# Patient Record
Sex: FEMALE | Race: WHITE | NOT HISPANIC OR LATINO | Employment: OTHER | URBAN - METROPOLITAN AREA
[De-identification: names, ages, dates, MRNs, and addresses within clinical notes are randomized per-mention and may not be internally consistent; named-entity substitution may affect disease eponyms.]

---

## 2023-08-11 ENCOUNTER — HOSPITAL ENCOUNTER (OUTPATIENT)
Dept: DATA CONVERSION | Facility: HOSPITAL | Age: 88
Discharge: HOME | End: 2023-08-11

## 2023-08-11 DIAGNOSIS — N18.4 CHRONIC KIDNEY DISEASE, STAGE 4 (SEVERE) (MULTI): ICD-10-CM

## 2023-08-11 LAB
ALBUMIN SERPL-MCNC: 4 GM/DL (ref 3.5–5)
ANION GAP SERPL CALCULATED.3IONS-SCNC: 16 MMOL/L (ref 0–19)
BUN SERPL-MCNC: 74 MG/DL (ref 8–25)
BUN/CREAT SERPL: 22.4 RATIO (ref 8–21)
CALCIUM SERPL-MCNC: 9 MG/DL (ref 8.5–10.4)
CHLORIDE SERPL-SCNC: 102 MMOL/L (ref 97–107)
CO2 SERPL-SCNC: 17 MMOL/L (ref 24–31)
CREAT SERPL-MCNC: 3.3 MG/DL (ref 0.4–1.6)
DEPRECATED RDW RBC AUTO: 40 FL (ref 37–54)
ERYTHROCYTE [DISTWIDTH] IN BLOOD BY AUTOMATED COUNT: 12.1 % (ref 11.7–15)
GFR SERPL CREATININE-BSD FRML MDRD: 13 ML/MIN/1.73 M2
GLUCOSE SERPL-MCNC: 118 MG/DL (ref 65–99)
HCT VFR BLD AUTO: 26.9 % (ref 36–44)
HGB BLD-MCNC: 9.4 GM/DL (ref 12–15)
MCH RBC QN AUTO: 31.6 PG (ref 26–34)
MCHC RBC AUTO-ENTMCNC: 34.9 % (ref 31–37)
MCV RBC AUTO: 90.6 FL (ref 80–100)
NRBC BLD-RTO: 0 /100 WBC
PHOSPHATE SERPL-MCNC: 5 MG/DL (ref 2.5–4.5)
PLATELET # BLD AUTO: 194 K/UL (ref 150–450)
PMV BLD AUTO: 10.1 CU (ref 7–12.6)
POTASSIUM SERPL-SCNC: 4.9 MMOL/L (ref 3.4–5.1)
PTH-INTACT SERPL-MCNC: 218 PG/ML (ref 15–65)
RBC # BLD AUTO: 2.97 M/UL (ref 4–4.9)
SODIUM SERPL-SCNC: 135 MMOL/L (ref 133–145)
WBC # BLD AUTO: 6.8 K/UL (ref 4.5–11)

## 2023-08-25 PROBLEM — M81.0 OSTEOPOROSIS: Status: ACTIVE | Noted: 2023-08-25

## 2023-08-25 PROBLEM — H02.834 DERMATOCHALASIS OF LEFT UPPER EYELID: Status: ACTIVE | Noted: 2023-08-25

## 2023-08-25 PROBLEM — E78.5 HYPERLIPIDEMIA: Status: ACTIVE | Noted: 2023-08-25

## 2023-08-25 PROBLEM — I82.90 VENOUS THROMBOSIS: Status: ACTIVE | Noted: 2023-08-25

## 2023-08-25 PROBLEM — G47.00 INSOMNIA: Status: ACTIVE | Noted: 2023-08-25

## 2023-08-25 PROBLEM — H04.129 TEAR FILM INSUFFICIENCY: Status: ACTIVE | Noted: 2023-08-25

## 2023-08-25 PROBLEM — I10 BENIGN ESSENTIAL HYPERTENSION: Status: ACTIVE | Noted: 2023-08-25

## 2023-08-25 PROBLEM — E55.9 VITAMIN D DEFICIENCY: Status: ACTIVE | Noted: 2023-08-25

## 2023-08-25 PROBLEM — R73.9 HYPERGLYCEMIA: Status: ACTIVE | Noted: 2023-08-25

## 2023-08-25 PROBLEM — R53.1 ASTHENIA: Status: ACTIVE | Noted: 2023-08-25

## 2023-08-25 PROBLEM — E83.42 HYPOMAGNESEMIA: Status: ACTIVE | Noted: 2023-08-25

## 2023-08-25 PROBLEM — H02.831 DERMATOCHALASIS OF RIGHT UPPER EYELID: Status: ACTIVE | Noted: 2023-08-25

## 2023-08-25 PROBLEM — H35.379 EPIRETINAL MEMBRANE: Status: ACTIVE | Noted: 2023-08-25

## 2023-08-25 PROBLEM — H35.89 MACULAR ATROPHY, RETINAL: Status: ACTIVE | Noted: 2023-08-25

## 2023-08-25 PROBLEM — N18.9 CRF (CHRONIC RENAL FAILURE): Status: ACTIVE | Noted: 2023-08-25

## 2023-08-25 RX ORDER — POLYETHYLENE GLYCOL 3350 17 G/17G
POWDER, FOR SOLUTION ORAL DAILY PRN
COMMUNITY

## 2023-08-25 RX ORDER — FUROSEMIDE 80 MG/1
1 TABLET ORAL DAILY
COMMUNITY

## 2023-08-25 RX ORDER — POLYVINYL ALCOHOL 14 MG/ML
SOLUTION/ DROPS OPHTHALMIC
COMMUNITY

## 2023-08-25 RX ORDER — ATORVASTATIN CALCIUM 10 MG/1
1 TABLET, FILM COATED ORAL DAILY
COMMUNITY
End: 2023-11-02

## 2023-08-25 RX ORDER — SODIUM BICARBONATE 650 MG/1
1 TABLET ORAL 4 TIMES DAILY
COMMUNITY

## 2023-08-25 RX ORDER — FUROSEMIDE 40 MG/1
1 TABLET ORAL DAILY
COMMUNITY
Start: 2022-12-19 | End: 2023-10-23 | Stop reason: ALTCHOICE

## 2023-08-25 RX ORDER — SEVELAMER CARBONATE 800 MG/1
1 TABLET, FILM COATED ORAL
COMMUNITY
Start: 2023-02-20

## 2023-08-25 RX ORDER — AMLODIPINE BESYLATE 5 MG/1
1.5 TABLET ORAL DAILY
COMMUNITY
End: 2023-10-23 | Stop reason: ALTCHOICE

## 2023-08-25 RX ORDER — CYANOCOBALAMIN (VITAMIN B-12) 500 MCG
1 TABLET ORAL NIGHTLY
COMMUNITY
End: 2023-10-23 | Stop reason: ALTCHOICE

## 2023-08-25 RX ORDER — GLUCOSAM/CHONDRO/HERB 149/HYAL 750-100 MG
1 TABLET ORAL DAILY
COMMUNITY
End: 2023-10-23 | Stop reason: ALTCHOICE

## 2023-08-25 RX ORDER — CALCITRIOL 0.25 UG/1
1 CAPSULE ORAL
COMMUNITY
End: 2023-10-23 | Stop reason: ALTCHOICE

## 2023-08-25 RX ORDER — CYCLOSPORINE 0.5 MG/ML
1 EMULSION OPHTHALMIC 2 TIMES DAILY
COMMUNITY

## 2023-08-25 RX ORDER — ASPIRIN 325 MG
1 TABLET, DELAYED RELEASE (ENTERIC COATED) ORAL
COMMUNITY
End: 2024-04-22 | Stop reason: SDUPTHER

## 2023-08-25 RX ORDER — LOSARTAN POTASSIUM 50 MG/1
1 TABLET ORAL DAILY
COMMUNITY

## 2023-10-23 ENCOUNTER — OFFICE VISIT (OUTPATIENT)
Dept: PRIMARY CARE | Facility: CLINIC | Age: 88
End: 2023-10-23
Payer: MEDICARE

## 2023-10-23 VITALS
HEART RATE: 100 BPM | BODY MASS INDEX: 25.96 KG/M2 | SYSTOLIC BLOOD PRESSURE: 116 MMHG | DIASTOLIC BLOOD PRESSURE: 54 MMHG | WEIGHT: 145.4 LBS | OXYGEN SATURATION: 98 %

## 2023-10-23 DIAGNOSIS — R73.9 HYPERGLYCEMIA: ICD-10-CM

## 2023-10-23 DIAGNOSIS — N18.4 CHRONIC RENAL FAILURE, STAGE 4 (SEVERE) (MULTI): ICD-10-CM

## 2023-10-23 DIAGNOSIS — I10 BENIGN ESSENTIAL HYPERTENSION: Primary | ICD-10-CM

## 2023-10-23 DIAGNOSIS — M81.0 AGE-RELATED OSTEOPOROSIS WITHOUT CURRENT PATHOLOGICAL FRACTURE: ICD-10-CM

## 2023-10-23 DIAGNOSIS — E78.2 MIXED HYPERLIPIDEMIA: ICD-10-CM

## 2023-10-23 DIAGNOSIS — E55.9 VITAMIN D DEFICIENCY: ICD-10-CM

## 2023-10-23 DIAGNOSIS — F51.01 PRIMARY INSOMNIA: ICD-10-CM

## 2023-10-23 PROBLEM — R53.1 ASTHENIA: Status: RESOLVED | Noted: 2023-08-25 | Resolved: 2023-10-23

## 2023-10-23 LAB — POC HEMOGLOBIN A1C: 6.2 % (ref 4.2–6.5)

## 2023-10-23 PROCEDURE — 99214 OFFICE O/P EST MOD 30 MIN: CPT | Performed by: INTERNAL MEDICINE

## 2023-10-23 PROCEDURE — G0009 ADMIN PNEUMOCOCCAL VACCINE: HCPCS | Performed by: INTERNAL MEDICINE

## 2023-10-23 PROCEDURE — 90471 IMMUNIZATION ADMIN: CPT | Performed by: INTERNAL MEDICINE

## 2023-10-23 PROCEDURE — 90662 IIV NO PRSV INCREASED AG IM: CPT | Performed by: INTERNAL MEDICINE

## 2023-10-23 PROCEDURE — 90677 PCV20 VACCINE IM: CPT | Performed by: INTERNAL MEDICINE

## 2023-10-23 PROCEDURE — 1036F TOBACCO NON-USER: CPT | Performed by: INTERNAL MEDICINE

## 2023-10-23 PROCEDURE — 1126F AMNT PAIN NOTED NONE PRSNT: CPT | Performed by: INTERNAL MEDICINE

## 2023-10-23 PROCEDURE — 1159F MED LIST DOCD IN RCRD: CPT | Performed by: INTERNAL MEDICINE

## 2023-10-23 PROCEDURE — 3074F SYST BP LT 130 MM HG: CPT | Performed by: INTERNAL MEDICINE

## 2023-10-23 PROCEDURE — 3078F DIAST BP <80 MM HG: CPT | Performed by: INTERNAL MEDICINE

## 2023-10-23 PROCEDURE — 83036 HEMOGLOBIN GLYCOSYLATED A1C: CPT | Mod: QW | Performed by: INTERNAL MEDICINE

## 2023-10-23 RX ORDER — AMLODIPINE BESYLATE 10 MG/1
10 TABLET ORAL DAILY
COMMUNITY

## 2023-10-23 ASSESSMENT — PATIENT HEALTH QUESTIONNAIRE - PHQ9
1. LITTLE INTEREST OR PLEASURE IN DOING THINGS: NOT AT ALL
2. FEELING DOWN, DEPRESSED OR HOPELESS: NOT AT ALL
SUM OF ALL RESPONSES TO PHQ9 QUESTIONS 1 AND 2: 0

## 2023-10-23 ASSESSMENT — ENCOUNTER SYMPTOMS
DYSURIA: 0
CONSTIPATION: 0
DIARRHEA: 0
OCCASIONAL FEELINGS OF UNSTEADINESS: 0
LOSS OF SENSATION IN FEET: 0
CONSTITUTIONAL NEGATIVE: 1
SHORTNESS OF BREATH: 0
PSYCHIATRIC NEGATIVE: 1
CARDIOVASCULAR NEGATIVE: 1
ARTHRALGIAS: 1
ABDOMINAL PAIN: 0
ACTIVITY CHANGE: 0
COUGH: 0

## 2023-10-23 ASSESSMENT — PAIN SCALES - GENERAL: PAINLEVEL: 0-NO PAIN

## 2023-10-23 NOTE — PROGRESS NOTES
Subjective   Patient ID: Annetta Miguel is a 89 y.o. female who presents for 6 month follow up.    Hypertension-compliant and stable on current medications     Hyperlipidemia-stable and compliant on meds. Taking atorvastatin. Last LDL on  10/2022    CRF-managed by Dr Littlejohn--reports fairly stable Stage 4    Hyperglycemia-10/2023 A1c    Insomnia--gets up 4-5 x/night to urinate-usually able to go back to sleep    Reports having the covid shot at [place] but not the flu shot    Reviewed recent labs from nephrology         Review of Systems   Constitutional: Negative.  Negative for activity change.        Starting to slow down from age   Respiratory:  Negative for cough and shortness of breath.    Cardiovascular: Negative.    Gastrointestinal:  Negative for abdominal pain, constipation and diarrhea.   Genitourinary:  Negative for dysuria.        Nocturia   Musculoskeletal:  Positive for arthralgias.        Achey in the morning   Skin:  Negative for rash.   Psychiatric/Behavioral: Negative.         Objective   /54 (BP Location: Left arm, Patient Position: Sitting)   Pulse 100   Wt 66 kg (145 lb 6.4 oz)   SpO2 98%   BMI 25.96 kg/m²     Physical Exam  Constitutional:       General: She is not in acute distress.     Appearance: Normal appearance.   Cardiovascular:      Rate and Rhythm: Normal rate and regular rhythm.      Heart sounds: Normal heart sounds. No murmur heard.     No gallop.   Pulmonary:      Breath sounds: Normal breath sounds. No wheezing, rhonchi or rales.   Skin:     General: Skin is warm and dry.      Findings: No rash.      Comments: Diffuse age changes   Neurological:      General: No focal deficit present.      Mental Status: She is alert and oriented to person, place, and time.   Psychiatric:         Mood and Affect: Mood normal.         Behavior: Behavior normal.       Assessment/Plan   continue same regimen       Annetta was seen today for 6 month follow up.  Diagnoses and all  orders for this visit:  Benign essential hypertension (Primary)  Mixed hyperlipidemia  Vitamin D deficiency  Age-related osteoporosis without current pathological fracture  Hyperglycemia  -     POCT glycosylated hemoglobin (Hb A1C) manually resulted  Chronic renal failure, stage 4 (severe) (CMS/HCC)  Primary insomnia  Other orders  -     Flu vaccine, quadrivalent, high-dose, preservative free, age 65y+ (FLUZONE)  -     Pneumococcal conjugate vaccine, 20-valent (PREVNAR 20)  -     Follow Up In Primary Care - Established; Future

## 2023-11-02 DIAGNOSIS — R73.9 HYPERGLYCEMIA: Primary | ICD-10-CM

## 2023-11-02 DIAGNOSIS — E78.2 MIXED HYPERLIPIDEMIA: ICD-10-CM

## 2023-11-02 RX ORDER — ATORVASTATIN CALCIUM 10 MG/1
10 TABLET, FILM COATED ORAL DAILY
Qty: 90 TABLET | Refills: 3 | Status: SHIPPED | OUTPATIENT
Start: 2023-11-02

## 2023-12-13 ENCOUNTER — LAB (OUTPATIENT)
Dept: LAB | Facility: LAB | Age: 88
End: 2023-12-13
Payer: MEDICARE

## 2023-12-13 DIAGNOSIS — N18.4 CHRONIC KIDNEY DISEASE, STAGE 4 (SEVERE) (MULTI): Primary | ICD-10-CM

## 2023-12-13 LAB
ALBUMIN SERPL-MCNC: 4.1 G/DL (ref 3.5–5)
ANION GAP SERPL CALC-SCNC: 16 MMOL/L
BUN SERPL-MCNC: 59 MG/DL (ref 8–25)
CALCIUM SERPL-MCNC: 9.1 MG/DL (ref 8.5–10.4)
CHLORIDE SERPL-SCNC: 103 MMOL/L (ref 97–107)
CO2 SERPL-SCNC: 19 MMOL/L (ref 24–31)
CREAT SERPL-MCNC: 3 MG/DL (ref 0.4–1.6)
ERYTHROCYTE [DISTWIDTH] IN BLOOD BY AUTOMATED COUNT: 12.5 % (ref 11.5–14.5)
GFR SERPL CREATININE-BSD FRML MDRD: 14 ML/MIN/1.73M*2
GLUCOSE SERPL-MCNC: 96 MG/DL (ref 65–99)
HCT VFR BLD AUTO: 28.9 % (ref 36–46)
HGB BLD-MCNC: 9.4 G/DL (ref 12–16)
MCH RBC QN AUTO: 31.5 PG (ref 26–34)
MCHC RBC AUTO-ENTMCNC: 32.5 G/DL (ref 32–36)
MCV RBC AUTO: 97 FL (ref 80–100)
NRBC BLD-RTO: 0 /100 WBCS (ref 0–0)
PHOSPHATE SERPL-MCNC: 3.9 MG/DL (ref 2.5–4.5)
PLATELET # BLD AUTO: 223 X10*3/UL (ref 150–450)
POTASSIUM SERPL-SCNC: 4.7 MMOL/L (ref 3.4–5.1)
PTH-INTACT SERPL-MCNC: 241.5 PG/ML (ref 18.5–88)
RBC # BLD AUTO: 2.98 X10*6/UL (ref 4–5.2)
SODIUM SERPL-SCNC: 138 MMOL/L (ref 133–145)
WBC # BLD AUTO: 6.4 X10*3/UL (ref 4.4–11.3)

## 2023-12-13 PROCEDURE — 85027 COMPLETE CBC AUTOMATED: CPT

## 2023-12-13 PROCEDURE — 83970 ASSAY OF PARATHORMONE: CPT

## 2023-12-13 PROCEDURE — 80069 RENAL FUNCTION PANEL: CPT

## 2023-12-13 PROCEDURE — 36415 COLL VENOUS BLD VENIPUNCTURE: CPT

## 2024-03-06 ENCOUNTER — LAB (OUTPATIENT)
Dept: LAB | Facility: LAB | Age: 89
End: 2024-03-06
Payer: MEDICARE

## 2024-03-06 DIAGNOSIS — N18.4 CHRONIC KIDNEY DISEASE, STAGE 4 (SEVERE) (MULTI): ICD-10-CM

## 2024-03-06 DIAGNOSIS — D63.1 ANEMIA IN CHRONIC KIDNEY DISEASE (CODE): Primary | ICD-10-CM

## 2024-03-06 LAB
ALBUMIN SERPL-MCNC: 4.2 G/DL (ref 3.5–5)
ALP BLD-CCNC: 71 U/L (ref 35–125)
ALT SERPL-CCNC: 8 U/L (ref 5–40)
ANION GAP SERPL CALC-SCNC: 17 MMOL/L
AST SERPL-CCNC: 16 U/L (ref 5–40)
BILIRUB DIRECT SERPL-MCNC: <0.2 MG/DL (ref 0–0.2)
BILIRUB SERPL-MCNC: 0.4 MG/DL (ref 0.1–1.2)
BUN SERPL-MCNC: 63 MG/DL (ref 8–25)
CALCIUM SERPL-MCNC: 9 MG/DL (ref 8.5–10.4)
CHLORIDE SERPL-SCNC: 102 MMOL/L (ref 97–107)
CO2 SERPL-SCNC: 17 MMOL/L (ref 24–31)
CREAT SERPL-MCNC: 3.2 MG/DL (ref 0.4–1.6)
EGFRCR SERPLBLD CKD-EPI 2021: 13 ML/MIN/1.73M*2
ERYTHROCYTE [DISTWIDTH] IN BLOOD BY AUTOMATED COUNT: 12.7 % (ref 11.5–14.5)
FERRITIN SERPL-MCNC: 232 NG/ML (ref 13–150)
GLUCOSE SERPL-MCNC: 93 MG/DL (ref 65–99)
HCT VFR BLD AUTO: 28.7 % (ref 36–46)
HGB BLD-MCNC: 9.4 G/DL (ref 12–16)
MCH RBC QN AUTO: 31.4 PG (ref 26–34)
MCHC RBC AUTO-ENTMCNC: 32.8 G/DL (ref 32–36)
MCV RBC AUTO: 96 FL (ref 80–100)
NRBC BLD-RTO: 0 /100 WBCS (ref 0–0)
PHOSPHATE SERPL-MCNC: 4.2 MG/DL (ref 2.5–4.5)
PLATELET # BLD AUTO: 231 X10*3/UL (ref 150–450)
POTASSIUM SERPL-SCNC: 4.9 MMOL/L (ref 3.4–5.1)
PROT SERPL-MCNC: 6.8 G/DL (ref 5.9–7.9)
PTH-INTACT SERPL-MCNC: 403.3 PG/ML (ref 18.5–88)
RBC # BLD AUTO: 2.99 X10*6/UL (ref 4–5.2)
SODIUM SERPL-SCNC: 136 MMOL/L (ref 133–145)
TRANSFERRIN SERPL-MCNC: 222 MG/DL (ref 200–360)
WBC # BLD AUTO: 6.4 X10*3/UL (ref 4.4–11.3)

## 2024-03-06 PROCEDURE — 82248 BILIRUBIN DIRECT: CPT

## 2024-03-06 PROCEDURE — 80053 COMPREHEN METABOLIC PANEL: CPT

## 2024-03-06 PROCEDURE — 82728 ASSAY OF FERRITIN: CPT

## 2024-03-06 PROCEDURE — 84100 ASSAY OF PHOSPHORUS: CPT

## 2024-03-06 PROCEDURE — 84466 ASSAY OF TRANSFERRIN: CPT

## 2024-03-06 PROCEDURE — 85027 COMPLETE CBC AUTOMATED: CPT

## 2024-03-06 PROCEDURE — 36415 COLL VENOUS BLD VENIPUNCTURE: CPT

## 2024-03-06 PROCEDURE — 83970 ASSAY OF PARATHORMONE: CPT

## 2024-04-22 ENCOUNTER — OFFICE VISIT (OUTPATIENT)
Dept: PRIMARY CARE | Facility: CLINIC | Age: 89
End: 2024-04-22
Payer: MEDICARE

## 2024-04-22 VITALS
DIASTOLIC BLOOD PRESSURE: 58 MMHG | SYSTOLIC BLOOD PRESSURE: 134 MMHG | OXYGEN SATURATION: 98 % | BODY MASS INDEX: 25 KG/M2 | WEIGHT: 140 LBS | HEART RATE: 108 BPM

## 2024-04-22 DIAGNOSIS — E83.42 HYPOMAGNESEMIA: ICD-10-CM

## 2024-04-22 DIAGNOSIS — E78.2 MIXED HYPERLIPIDEMIA: ICD-10-CM

## 2024-04-22 DIAGNOSIS — F51.01 PRIMARY INSOMNIA: ICD-10-CM

## 2024-04-22 DIAGNOSIS — I10 BENIGN ESSENTIAL HYPERTENSION: Primary | ICD-10-CM

## 2024-04-22 DIAGNOSIS — N18.4 CHRONIC RENAL FAILURE, STAGE 4 (SEVERE) (MULTI): ICD-10-CM

## 2024-04-22 DIAGNOSIS — R73.9 HYPERGLYCEMIA: ICD-10-CM

## 2024-04-22 DIAGNOSIS — M81.0 AGE-RELATED OSTEOPOROSIS WITHOUT CURRENT PATHOLOGICAL FRACTURE: ICD-10-CM

## 2024-04-22 DIAGNOSIS — E55.9 VITAMIN D DEFICIENCY: ICD-10-CM

## 2024-04-22 PROCEDURE — 99214 OFFICE O/P EST MOD 30 MIN: CPT | Performed by: INTERNAL MEDICINE

## 2024-04-22 PROCEDURE — 3075F SYST BP GE 130 - 139MM HG: CPT | Performed by: INTERNAL MEDICINE

## 2024-04-22 PROCEDURE — 1126F AMNT PAIN NOTED NONE PRSNT: CPT | Performed by: INTERNAL MEDICINE

## 2024-04-22 PROCEDURE — 1157F ADVNC CARE PLAN IN RCRD: CPT | Performed by: INTERNAL MEDICINE

## 2024-04-22 PROCEDURE — 3078F DIAST BP <80 MM HG: CPT | Performed by: INTERNAL MEDICINE

## 2024-04-22 PROCEDURE — 1036F TOBACCO NON-USER: CPT | Performed by: INTERNAL MEDICINE

## 2024-04-22 PROCEDURE — 1159F MED LIST DOCD IN RCRD: CPT | Performed by: INTERNAL MEDICINE

## 2024-04-22 RX ORDER — CALCITRIOL 0.25 UG/1
0.25 CAPSULE ORAL EVERY OTHER DAY
COMMUNITY

## 2024-04-22 ASSESSMENT — ENCOUNTER SYMPTOMS
DIARRHEA: 0
DEPRESSION: 0
OCCASIONAL FEELINGS OF UNSTEADINESS: 0
SHORTNESS OF BREATH: 0
ABDOMINAL PAIN: 0
COUGH: 0
CARDIOVASCULAR NEGATIVE: 1
PSYCHIATRIC NEGATIVE: 1
CONSTITUTIONAL NEGATIVE: 1
ARTHRALGIAS: 1
ACTIVITY CHANGE: 0
DYSURIA: 0
CONSTIPATION: 0
LOSS OF SENSATION IN FEET: 0

## 2024-04-22 ASSESSMENT — PAIN SCALES - GENERAL: PAINLEVEL: 0-NO PAIN

## 2024-04-22 ASSESSMENT — PATIENT HEALTH QUESTIONNAIRE - PHQ9
2. FEELING DOWN, DEPRESSED OR HOPELESS: NOT AT ALL
SUM OF ALL RESPONSES TO PHQ9 QUESTIONS 1 AND 2: 0
1. LITTLE INTEREST OR PLEASURE IN DOING THINGS: NOT AT ALL

## 2024-04-22 NOTE — PROGRESS NOTES
Subjective   Patient ID: Annetta Miguel is a 89 y.o. female who presents for 6 month follow up .    Hypertension-compliant and stable on current medications     Hyperlipidemia-stable and compliant on meds. Taking atorvastatin. Last LDL on  10/2022    CRF-managed by Dr Littlejohn--reports fairly stable Stage 4    Hyperglycemia-10/2023 A1c    Insomnia--gets up 4-5 x/night to urinate-usually able to go back to sleep             Review of Systems   Constitutional: Negative.  Negative for activity change.        Starting to slow down from age   Respiratory:  Negative for cough and shortness of breath.    Cardiovascular: Negative.    Gastrointestinal:  Negative for abdominal pain, constipation and diarrhea.   Genitourinary:  Negative for dysuria.        Nocturia   Musculoskeletal:  Positive for arthralgias.        Achey in the morning   Skin:  Negative for rash.   Psychiatric/Behavioral: Negative.         Objective   /58 (BP Location: Left arm, Patient Position: Sitting)   Pulse 108   Wt 63.5 kg (140 lb)   SpO2 98%   BMI 25.00 kg/m²     Physical Exam  Constitutional:       General: She is not in acute distress.     Appearance: Normal appearance.   Cardiovascular:      Rate and Rhythm: Normal rate and regular rhythm.      Heart sounds: Normal heart sounds. No murmur heard.     No gallop.   Pulmonary:      Breath sounds: Normal breath sounds. No wheezing, rhonchi or rales.   Skin:     General: Skin is warm and dry.      Findings: No rash.      Comments: Diffuse age changes   Neurological:      General: No focal deficit present.      Mental Status: She is alert and oriented to person, place, and time.   Psychiatric:         Mood and Affect: Mood normal.         Behavior: Behavior normal.         Assessment/Plan  will continue regimen  Diagnoses and all orders for this visit:  Benign essential hypertension  Mixed hyperlipidemia  Hyperglycemia  Age-related osteoporosis without current pathological fracture  -      XR DEXA bone density; Future  Vitamin D deficiency  Chronic renal failure, stage 4 (severe) (Multi)  Hypomagnesemia  Primary insomnia  Other orders  -     Follow Up In Primary Care - Established; Future      Current Outpatient Medications:     amLODIPine (Norvasc) 10 mg tablet, Take 1 tablet (10 mg) by mouth once daily., Disp: , Rfl:     atorvastatin (Lipitor) 10 mg tablet, TAKE 1 TABLET BY MOUTH EVERY DAY, Disp: 90 tablet, Rfl: 3    cycloSPORINE (Restasis MultiDose) 0.05 % drops, Administer 1 drop into affected eye(s) 2 times a day., Disp: , Rfl:     furosemide (Lasix) 80 mg tablet, Take 1 tablet (80 mg) by mouth once daily., Disp: , Rfl:     losartan (Cozaar) 50 mg tablet, Take 1 tablet (50 mg) by mouth once daily., Disp: , Rfl:     polyethylene glycol (Glycolax, Miralax) 17 gram/dose powder, Take by mouth once daily as needed.  FOR CONSTIPATION, Disp: , Rfl:     polyvinyl alcohol (Liquifilm Tears) 1.4 % ophthalmic solution, Ophthalmic, Disp: , Rfl:     sevelamer carbonate (Renvela) 800 mg tablet, Take 1 tablet (800 mg) by mouth 3 times a day with meals., Disp: , Rfl:     sodium bicarbonate 650 mg tablet, Take 1 tablet (650 mg) by mouth 4 times a day., Disp: , Rfl:     calcitriol (Rocaltrol) 0.25 mcg capsule, Take 1 capsule (0.25 mcg) by mouth every other day., Disp: , Rfl:

## 2024-05-09 ENCOUNTER — HOSPITAL ENCOUNTER (OUTPATIENT)
Dept: RADIOLOGY | Facility: HOSPITAL | Age: 89
Discharge: HOME | End: 2024-05-09
Payer: MEDICARE

## 2024-05-09 DIAGNOSIS — M81.0 AGE-RELATED OSTEOPOROSIS WITHOUT CURRENT PATHOLOGICAL FRACTURE: ICD-10-CM

## 2024-05-09 PROCEDURE — 77080 DXA BONE DENSITY AXIAL: CPT | Performed by: RADIOLOGY

## 2024-05-09 PROCEDURE — 77080 DXA BONE DENSITY AXIAL: CPT

## 2024-05-22 ENCOUNTER — OFFICE VISIT (OUTPATIENT)
Dept: PRIMARY CARE | Facility: CLINIC | Age: 89
End: 2024-05-22
Payer: MEDICARE

## 2024-05-22 VITALS
BODY MASS INDEX: 24.89 KG/M2 | HEART RATE: 106 BPM | DIASTOLIC BLOOD PRESSURE: 58 MMHG | SYSTOLIC BLOOD PRESSURE: 146 MMHG | WEIGHT: 139.4 LBS | OXYGEN SATURATION: 98 %

## 2024-05-22 DIAGNOSIS — M81.0 AGE-RELATED OSTEOPOROSIS WITHOUT CURRENT PATHOLOGICAL FRACTURE: Primary | ICD-10-CM

## 2024-05-22 PROCEDURE — 99214 OFFICE O/P EST MOD 30 MIN: CPT | Performed by: INTERNAL MEDICINE

## 2024-05-22 PROCEDURE — 1159F MED LIST DOCD IN RCRD: CPT | Performed by: INTERNAL MEDICINE

## 2024-05-22 PROCEDURE — 1036F TOBACCO NON-USER: CPT | Performed by: INTERNAL MEDICINE

## 2024-05-22 PROCEDURE — 3078F DIAST BP <80 MM HG: CPT | Performed by: INTERNAL MEDICINE

## 2024-05-22 PROCEDURE — 1126F AMNT PAIN NOTED NONE PRSNT: CPT | Performed by: INTERNAL MEDICINE

## 2024-05-22 PROCEDURE — 1157F ADVNC CARE PLAN IN RCRD: CPT | Performed by: INTERNAL MEDICINE

## 2024-05-22 PROCEDURE — 3077F SYST BP >= 140 MM HG: CPT | Performed by: INTERNAL MEDICINE

## 2024-05-22 ASSESSMENT — ENCOUNTER SYMPTOMS
OCCASIONAL FEELINGS OF UNSTEADINESS: 0
DEPRESSION: 0
LOSS OF SENSATION IN FEET: 0

## 2024-05-22 ASSESSMENT — PAIN SCALES - GENERAL: PAINLEVEL: 0-NO PAIN

## 2024-05-22 ASSESSMENT — PATIENT HEALTH QUESTIONNAIRE - PHQ9
SUM OF ALL RESPONSES TO PHQ9 QUESTIONS 1 AND 2: 0
1. LITTLE INTEREST OR PLEASURE IN DOING THINGS: NOT AT ALL
2. FEELING DOWN, DEPRESSED OR HOPELESS: NOT AT ALL

## 2024-05-22 NOTE — PROGRESS NOTES
Subjective   Patient ID: Annetta Miguel is a 89 y.o. female who presents for Follow-up.    Osteoporosis-last DEXA 5/2024 with hip T score -3,no known fractures. due to renal failure cannot use bisphosphinates so she is agreeable to start prolia.             Objective   /58 (BP Location: Left arm, Patient Position: Sitting)   Pulse 106   Wt 63.2 kg (139 lb 6.4 oz)   SpO2 98%   BMI 24.89 kg/m²         Assessment/Plan   Diagnoses and all orders for this visit:  Age-related osteoporosis without current pathological fracture    Will apply for PA from prolia

## 2024-06-14 ENCOUNTER — OFFICE VISIT (OUTPATIENT)
Dept: PRIMARY CARE | Facility: CLINIC | Age: 89
End: 2024-06-14
Payer: MEDICARE

## 2024-06-14 VITALS
OXYGEN SATURATION: 96 % | WEIGHT: 138.4 LBS | DIASTOLIC BLOOD PRESSURE: 58 MMHG | BODY MASS INDEX: 24.71 KG/M2 | HEART RATE: 100 BPM | SYSTOLIC BLOOD PRESSURE: 138 MMHG

## 2024-06-14 DIAGNOSIS — M81.0 AGE-RELATED OSTEOPOROSIS WITHOUT CURRENT PATHOLOGICAL FRACTURE: Primary | ICD-10-CM

## 2024-06-14 ASSESSMENT — ENCOUNTER SYMPTOMS
OCCASIONAL FEELINGS OF UNSTEADINESS: 0
LOSS OF SENSATION IN FEET: 0
DEPRESSION: 0

## 2024-06-14 ASSESSMENT — PAIN SCALES - GENERAL: PAINLEVEL: 0-NO PAIN

## 2024-06-14 NOTE — PROGRESS NOTES
Subjective   Patient ID: Annetta Miguel is a 89 y.o. female who presents for discuss bone density.    Osteoporosis-last DEXA 5/2024 with hip T score -3,no known fractures. due to renal failure cannot use bisphosphinates so she is agreeable to start prolia. Unfortuanately prio auth was never completed-begun today by          Review of Systems    Objective   /58 (BP Location: Left arm, Patient Position: Sitting)   Pulse 100   Wt 62.8 kg (138 lb 6.4 oz)   SpO2 96%   BMI 24.71 kg/m²     Physical Exam    Assessment/Plan   Diagnoses and all orders for this visit:  Age-related osteoporosis without current pathological fracture

## 2024-07-17 ENCOUNTER — CLINICAL SUPPORT (OUTPATIENT)
Dept: PRIMARY CARE | Facility: CLINIC | Age: 89
End: 2024-07-17
Payer: MEDICARE

## 2024-07-17 DIAGNOSIS — M81.0 AGE-RELATED OSTEOPOROSIS WITHOUT CURRENT PATHOLOGICAL FRACTURE: ICD-10-CM

## 2024-07-17 PROCEDURE — 96372 THER/PROPH/DIAG INJ SC/IM: CPT | Performed by: INTERNAL MEDICINE

## 2024-07-17 PROCEDURE — 2500000004 HC RX 250 GENERAL PHARMACY W/ HCPCS (ALT 636 FOR OP/ED): Mod: JZ | Performed by: INTERNAL MEDICINE

## 2024-07-22 ENCOUNTER — LAB (OUTPATIENT)
Dept: LAB | Facility: LAB | Age: 89
End: 2024-07-22
Payer: MEDICARE

## 2024-07-22 DIAGNOSIS — N18.30 CHRONIC KIDNEY DISEASE, STAGE 3 UNSPECIFIED (MULTI): Primary | ICD-10-CM

## 2024-07-22 LAB
ALBUMIN SERPL-MCNC: 4 G/DL (ref 3.5–5)
ANION GAP SERPL CALC-SCNC: 11 MMOL/L
BUN SERPL-MCNC: 56 MG/DL (ref 8–25)
CALCIUM SERPL-MCNC: 7 MG/DL (ref 8.5–10.4)
CHLORIDE SERPL-SCNC: 102 MMOL/L (ref 97–107)
CO2 SERPL-SCNC: 20 MMOL/L (ref 24–31)
CREAT SERPL-MCNC: 3 MG/DL (ref 0.4–1.6)
EGFRCR SERPLBLD CKD-EPI 2021: 14 ML/MIN/1.73M*2
ERYTHROCYTE [DISTWIDTH] IN BLOOD BY AUTOMATED COUNT: 12.8 % (ref 11.5–14.5)
GLUCOSE SERPL-MCNC: 93 MG/DL (ref 65–99)
HCT VFR BLD AUTO: 28.6 % (ref 36–46)
HGB BLD-MCNC: 9.3 G/DL (ref 12–16)
MCH RBC QN AUTO: 31.2 PG (ref 26–34)
MCHC RBC AUTO-ENTMCNC: 32.5 G/DL (ref 32–36)
MCV RBC AUTO: 96 FL (ref 80–100)
NRBC BLD-RTO: 0 /100 WBCS (ref 0–0)
PHOSPHATE SERPL-MCNC: 3.3 MG/DL (ref 2.5–4.5)
PLATELET # BLD AUTO: 215 X10*3/UL (ref 150–450)
POTASSIUM SERPL-SCNC: 5 MMOL/L (ref 3.4–5.1)
PTH-INTACT SERPL-MCNC: 787.4 PG/ML (ref 18.5–88)
RBC # BLD AUTO: 2.98 X10*6/UL (ref 4–5.2)
SODIUM SERPL-SCNC: 133 MMOL/L (ref 133–145)
WBC # BLD AUTO: 5.8 X10*3/UL (ref 4.4–11.3)

## 2024-07-22 PROCEDURE — 83970 ASSAY OF PARATHORMONE: CPT

## 2024-07-22 PROCEDURE — 36415 COLL VENOUS BLD VENIPUNCTURE: CPT

## 2024-07-22 PROCEDURE — 80069 RENAL FUNCTION PANEL: CPT

## 2024-07-22 PROCEDURE — 85027 COMPLETE CBC AUTOMATED: CPT

## 2024-08-06 ENCOUNTER — PATIENT OUTREACH (OUTPATIENT)
Dept: PRIMARY CARE | Facility: CLINIC | Age: 89
End: 2024-08-06
Payer: MEDICARE

## 2024-08-06 ENCOUNTER — DOCUMENTATION (OUTPATIENT)
Dept: PRIMARY CARE | Facility: CLINIC | Age: 89
End: 2024-08-06
Payer: MEDICARE

## 2024-08-06 ENCOUNTER — TELEPHONE (OUTPATIENT)
Dept: PRIMARY CARE | Facility: CLINIC | Age: 89
End: 2024-08-06
Payer: MEDICARE

## 2024-08-06 NOTE — PROGRESS NOTES
Discharge Facility: [facility]   Discharge Diagnosis: Muscle Spasm  Admission Date: 8/3/24  Discharge Date: 8/5/24    PCP Appointment Date: 8/12/24  Specialist Appointment Date: Unknown  Hospital Encounter and Summary Linked: Yes    See discharge assessment below for further details    Engagement  Call Start Time: 1043 (8/6/2024 10:45 AM)    Medications  Medications reviewed with patient/caregiver?: Yes (8/6/2024 10:45 AM)  Is the patient having any side effects they believe may be caused by any medication additions or changes?: No (8/6/2024 10:45 AM)  Does the patient have all medications ordered at discharge?: Yes (8/6/2024 10:45 AM)  Care Management Interventions: No intervention needed (8/6/2024 10:45 AM)  Prescription Comments: pt sent home with script (8/6/2024 10:45 AM)  Is the patient taking all medications as directed (includes completed medication regime)?: Yes (8/6/2024 10:45 AM)  Care Management Interventions: Provided patient education (8/6/2024 10:45 AM)  Medication Comments: Pt denies problems obtaining or affording medication (8/6/2024 10:45 AM)    Appointments  Does the patient have a primary care provider?: Yes (8/6/2024 10:45 AM)  Care Management Interventions: Verified appointment date/time/provider (8/6/2024 10:45 AM)  Has the patient kept scheduled appointments due by today?: Yes (8/6/2024 10:45 AM)  Care Management Interventions: Advised patient to keep appointment (8/6/2024 10:45 AM)    Self Management  What is the home health agency?: n/a (8/6/2024 10:45 AM)  Has home health visited the patient within 72 hours of discharge?: Not applicable (8/6/2024 10:45 AM)    Patient Teaching  Does the patient have access to their discharge instructions?: Yes (8/6/2024 10:45 AM)  Care Management Interventions: Reviewed instructions with patient (8/6/2024 10:45 AM)  What is the patient's perception of their health status since discharge?: Improving (8/6/2024 10:45 AM)  Is the patient/caregiver able  to teach back the hierarchy of who to call/visit for symptoms/problems? PCP, Specialist, Home Health nurse, Urgent Care, ED, 911: Yes (8/6/2024 10:45 AM)    Wrap Up  Wrap Up Additional Comments: This CM spoke with pt via phone. Pt reports doing well at home since discharge. New meds reviewed. Pt denies CP and SOB. Pt aware of my availability for non-emergent concerns. Contact info provided to patient (8/6/2024 10:45 AM)      Jesse Guerra LPN

## 2024-08-12 ENCOUNTER — OFFICE VISIT (OUTPATIENT)
Dept: PRIMARY CARE | Facility: CLINIC | Age: 89
End: 2024-08-12
Payer: MEDICARE

## 2024-08-12 VITALS
BODY MASS INDEX: 23.78 KG/M2 | HEART RATE: 90 BPM | WEIGHT: 133.2 LBS | DIASTOLIC BLOOD PRESSURE: 58 MMHG | SYSTOLIC BLOOD PRESSURE: 122 MMHG | OXYGEN SATURATION: 98 %

## 2024-08-12 DIAGNOSIS — I10 BENIGN ESSENTIAL HYPERTENSION: ICD-10-CM

## 2024-08-12 DIAGNOSIS — N18.4 CHRONIC RENAL FAILURE, STAGE 4 (SEVERE) (MULTI): ICD-10-CM

## 2024-08-12 DIAGNOSIS — M48.02 CERVICAL SPINAL STENOSIS: Primary | ICD-10-CM

## 2024-08-12 PROCEDURE — 1159F MED LIST DOCD IN RCRD: CPT | Performed by: INTERNAL MEDICINE

## 2024-08-12 PROCEDURE — 1157F ADVNC CARE PLAN IN RCRD: CPT | Performed by: INTERNAL MEDICINE

## 2024-08-12 PROCEDURE — 1126F AMNT PAIN NOTED NONE PRSNT: CPT | Performed by: INTERNAL MEDICINE

## 2024-08-12 PROCEDURE — 1036F TOBACCO NON-USER: CPT | Performed by: INTERNAL MEDICINE

## 2024-08-12 PROCEDURE — 3074F SYST BP LT 130 MM HG: CPT | Performed by: INTERNAL MEDICINE

## 2024-08-12 PROCEDURE — 99495 TRANSJ CARE MGMT MOD F2F 14D: CPT | Performed by: INTERNAL MEDICINE

## 2024-08-12 PROCEDURE — 3078F DIAST BP <80 MM HG: CPT | Performed by: INTERNAL MEDICINE

## 2024-08-12 ASSESSMENT — ENCOUNTER SYMPTOMS
COUGH: 0
LOSS OF SENSATION IN FEET: 0
OCCASIONAL FEELINGS OF UNSTEADINESS: 0
CONSTIPATION: 0
ACTIVITY CHANGE: 0
DYSURIA: 0
DIARRHEA: 0
SHORTNESS OF BREATH: 0
DEPRESSION: 0
ABDOMINAL PAIN: 0
ARTHRALGIAS: 1
PSYCHIATRIC NEGATIVE: 1
CONSTITUTIONAL NEGATIVE: 1
CARDIOVASCULAR NEGATIVE: 1

## 2024-08-12 ASSESSMENT — PAIN SCALES - GENERAL: PAINLEVEL: 0-NO PAIN

## 2024-08-12 NOTE — PROGRESS NOTES
"Patient: Annetta Miguel  : 1930  PCP: Rneae Bhakta MD  MRN: [id number]  Program: Transitional Care Management  Status: Enrolled  Effective Dates: 2024 - present  Responsible Staff: Jesse Guerra LPN  Social Determinants to be Addressed: Alcohol Use, Financial Resource Strain, Physical Activity, Social Connections, Stress, Transportation Needs         Annetta Miguel is a 89 y.o. female presenting today for follow-up after being discharged from the hospital 6 days ago. The main problem requiring admission was back and neck pain. The discharge summary and/or Transitional Care Management documentation was reviewed. Medication reconciliation was performed as indicated via the \"[place] as Reviewed\" timestamp.     Annetta Miguel was contacted by Transitional Care Management services two days after her discharge. This encounter and supporting documentation was reviewed.    Woke with stiff neck on left progressed to severe pain shoulder and back. Security advised 911.  Work up showed inflamed nerve. They tried multiple different meds before finding something that would help. Now back to normal and her normal activities. Able to exercise a little. Using cane when home because felt a little unsteady      Hypertension-compliant and stable on current medications      Hyperlipidemia-stable and compliant on meds. Taking atorvastatin. Last LDL on  10/2022     CRF-managed by Dr Littlejohn--reports fairly stable Stage 4-last visit he increased diuretic to 2x/day and taking calcitrol to daily     Hyperglycemia-10/2023 A1c     Insomnia--gets up 4-5 x/night to urinate-usually able to go back to sleep                            Osteoporosis-last DEXA 2024 with hip T score -3,no known fractures. due to renal failure cannot use bisphosphinates so she is agreeable to start prolia. Unfortuanately prio auth was never completed-begun today by      Here with daughter Taina    [facility] " records including notes, xray, labs and results     Review of Systems   Constitutional: Negative.  Negative for activity change.        Starting to slow down from age   Respiratory:  Negative for cough and shortness of breath.    Cardiovascular: Negative.    Gastrointestinal:  Negative for abdominal pain, constipation and diarrhea.   Genitourinary:  Negative for dysuria.        Nocturia   Musculoskeletal:  Positive for arthralgias.        Achey in the morning   Skin:  Negative for rash.   Psychiatric/Behavioral: Negative.         /58 (BP Location: Left arm, Patient Position: Sitting)   Pulse 90   Wt 60.4 kg (133 lb 3.2 oz)   SpO2 98%   BMI 23.78 kg/m²     Physical Exam  Constitutional:       General: She is not in acute distress.     Appearance: Normal appearance.      Comments: Using cane   Cardiovascular:      Rate and Rhythm: Normal rate and regular rhythm.      Heart sounds: Normal heart sounds. No murmur heard.     No gallop.   Pulmonary:      Breath sounds: Normal breath sounds. No wheezing, rhonchi or rales.   Skin:     General: Skin is warm and dry.      Findings: No rash.      Comments: Diffuse age changes   Neurological:      General: No focal deficit present.      Mental Status: She is alert and oriented to person, place, and time.   Psychiatric:         Mood and Affect: Mood normal.         Behavior: Behavior normal.         The complexity of medical decision making for this patient's transitional care is moderate.    Assessment/Plan  reviewed finding on CT-can be treated if recurs  Diagnoses and all orders for this visit:  Cervical spinal stenosis  Benign essential hypertension  Chronic renal failure, stage 4 (severe) (Multi)      Current Outpatient Medications:     amLODIPine (Norvasc) 10 mg tablet, Take 1 tablet (10 mg) by mouth once daily., Disp: , Rfl:     atorvastatin (Lipitor) 10 mg tablet, TAKE 1 TABLET BY MOUTH EVERY DAY, Disp: 90 tablet, Rfl: 3    calcitriol (Rocaltrol) 0.25 mcg  capsule, Take 1 capsule (0.25 mcg) by mouth every other day., Disp: , Rfl:     cycloSPORINE (Restasis MultiDose) 0.05 % drops, Administer 1 drop into affected eye(s) 2 times a day., Disp: , Rfl:     furosemide (Lasix) 80 mg tablet, Take 1 tablet (80 mg) by mouth once daily., Disp: , Rfl:     losartan (Cozaar) 50 mg tablet, Take 1 tablet (50 mg) by mouth once daily., Disp: , Rfl:     polyethylene glycol (Glycolax, Miralax) 17 gram/dose powder, Take by mouth once daily as needed.  FOR CONSTIPATION, Disp: , Rfl:     polyvinyl alcohol (Liquifilm Tears) 1.4 % ophthalmic solution, Ophthalmic, Disp: , Rfl:     sevelamer carbonate (Renvela) 800 mg tablet, Take 1 tablet (800 mg) by mouth 3 times daily (morning, midday, late afternoon)., Disp: , Rfl:     sodium bicarbonate 650 mg tablet, Take 1 tablet (650 mg) by mouth 4 times a day., Disp: , Rfl:     Current Facility-Administered Medications:     denosumab (Prolia) injection 60 mg, 60 mg, subcutaneous, q6 months, Renae Bhakta MD, 60 mg at 07/17/24 2329

## 2024-08-17 ENCOUNTER — APPOINTMENT (OUTPATIENT)
Dept: RADIOLOGY | Facility: HOSPITAL | Age: 89
DRG: 872 | End: 2024-08-17
Payer: MEDICARE

## 2024-08-17 ENCOUNTER — HOSPITAL ENCOUNTER (INPATIENT)
Facility: HOSPITAL | Age: 89
DRG: 872 | End: 2024-08-17
Attending: EMERGENCY MEDICINE | Admitting: STUDENT IN AN ORGANIZED HEALTH CARE EDUCATION/TRAINING PROGRAM
Payer: MEDICARE

## 2024-08-17 ENCOUNTER — APPOINTMENT (OUTPATIENT)
Dept: CARDIOLOGY | Facility: HOSPITAL | Age: 89
DRG: 872 | End: 2024-08-17
Payer: MEDICARE

## 2024-08-17 DIAGNOSIS — E83.42 HYPOMAGNESEMIA: ICD-10-CM

## 2024-08-17 DIAGNOSIS — N39.0 ACUTE UTI (URINARY TRACT INFECTION): ICD-10-CM

## 2024-08-17 DIAGNOSIS — R19.7 DIARRHEA: Primary | ICD-10-CM

## 2024-08-17 DIAGNOSIS — N17.9 AKI (ACUTE KIDNEY INJURY) (CMS-HCC): ICD-10-CM

## 2024-08-17 DIAGNOSIS — E83.51 HYPOCALCEMIA: ICD-10-CM

## 2024-08-17 DIAGNOSIS — D50.0 IRON DEFICIENCY ANEMIA DUE TO CHRONIC BLOOD LOSS: ICD-10-CM

## 2024-08-17 DIAGNOSIS — R79.89 OTHER SPECIFIED ABNORMAL FINDINGS OF BLOOD CHEMISTRY: ICD-10-CM

## 2024-08-17 DIAGNOSIS — E86.1 HYPOTENSION DUE TO HYPOVOLEMIA: ICD-10-CM

## 2024-08-17 DIAGNOSIS — I95.89 HYPOTENSION DUE TO HYPOVOLEMIA: ICD-10-CM

## 2024-08-17 DIAGNOSIS — N18.4 CHRONIC RENAL FAILURE, STAGE 4 (SEVERE) (MULTI): ICD-10-CM

## 2024-08-17 LAB
ALBUMIN SERPL-MCNC: 2.6 G/DL (ref 3.5–5)
ALBUMIN SERPL-MCNC: 2.9 G/DL (ref 3.5–5)
ALBUMIN SERPL-MCNC: 3 G/DL (ref 3.5–5)
ALP BLD-CCNC: 51 U/L (ref 35–125)
ALP BLD-CCNC: 54 U/L (ref 35–125)
ALP BLD-CCNC: 65 U/L (ref 35–125)
ALT SERPL-CCNC: 12 U/L (ref 5–40)
ALT SERPL-CCNC: 15 U/L (ref 5–40)
ALT SERPL-CCNC: 15 U/L (ref 5–40)
ANION GAP SERPL CALC-SCNC: 19 MMOL/L
ANION GAP SERPL CALC-SCNC: 19 MMOL/L
ANION GAP SERPL CALC-SCNC: >19 MMOL/L
APPEARANCE UR: ABNORMAL
AST SERPL-CCNC: 25 U/L (ref 5–40)
AST SERPL-CCNC: 27 U/L (ref 5–40)
AST SERPL-CCNC: 30 U/L (ref 5–40)
BACTERIA #/AREA URNS AUTO: ABNORMAL /HPF
BASOPHILS # BLD MANUAL: 0 X10*3/UL (ref 0–0.1)
BASOPHILS # BLD MANUAL: 0 X10*3/UL (ref 0–0.1)
BASOPHILS NFR BLD MANUAL: 0 %
BASOPHILS NFR BLD MANUAL: 0 %
BILIRUB SERPL-MCNC: 0.5 MG/DL (ref 0.1–1.2)
BILIRUB SERPL-MCNC: 0.6 MG/DL (ref 0.1–1.2)
BILIRUB SERPL-MCNC: 0.9 MG/DL (ref 0.1–1.2)
BILIRUB UR STRIP.AUTO-MCNC: NEGATIVE MG/DL
BUN SERPL-MCNC: 62 MG/DL (ref 8–25)
BUN SERPL-MCNC: 65 MG/DL (ref 8–25)
BUN SERPL-MCNC: 66 MG/DL (ref 8–25)
BURR CELLS BLD QL SMEAR: ABNORMAL
BURR CELLS BLD QL SMEAR: ABNORMAL
CA-I BLD-SCNC: 0.72 MMOL/L (ref 1.1–1.33)
CALCIUM SERPL-MCNC: 5.5 MG/DL (ref 8.5–10.4)
CALCIUM SERPL-MCNC: 5.8 MG/DL (ref 8.5–10.4)
CALCIUM SERPL-MCNC: 7.4 MG/DL (ref 8.5–10.4)
CHLORIDE SERPL-SCNC: 93 MMOL/L (ref 97–107)
CHLORIDE SERPL-SCNC: 93 MMOL/L (ref 97–107)
CHLORIDE SERPL-SCNC: 95 MMOL/L (ref 97–107)
CO2 SERPL-SCNC: 13 MMOL/L (ref 24–31)
CO2 SERPL-SCNC: 13 MMOL/L (ref 24–31)
CO2 SERPL-SCNC: 14 MMOL/L (ref 24–31)
COLOR UR: YELLOW
CREAT SERPL-MCNC: 3.7 MG/DL (ref 0.4–1.6)
CREAT SERPL-MCNC: 3.9 MG/DL (ref 0.4–1.6)
CREAT SERPL-MCNC: 4.4 MG/DL (ref 0.4–1.6)
DOHLE BOD BLD QL SMEAR: PRESENT
DOHLE BOD BLD QL SMEAR: PRESENT
EGFRCR SERPLBLD CKD-EPI 2021: 10 ML/MIN/1.73M*2
EGFRCR SERPLBLD CKD-EPI 2021: 11 ML/MIN/1.73M*2
EGFRCR SERPLBLD CKD-EPI 2021: 9 ML/MIN/1.73M*2
EOSINOPHIL # BLD MANUAL: 0 X10*3/UL (ref 0–0.4)
EOSINOPHIL # BLD MANUAL: 0 X10*3/UL (ref 0–0.4)
EOSINOPHIL NFR BLD MANUAL: 0 %
EOSINOPHIL NFR BLD MANUAL: 0 %
ERYTHROCYTE [DISTWIDTH] IN BLOOD BY AUTOMATED COUNT: 12.7 % (ref 11.5–14.5)
ERYTHROCYTE [DISTWIDTH] IN BLOOD BY AUTOMATED COUNT: 12.8 % (ref 11.5–14.5)
EST. AVERAGE GLUCOSE BLD GHB EST-MCNC: 117 MG/DL
FERRITIN SERPL-MCNC: 862 NG/ML (ref 13–150)
FOLATE SERPL-MCNC: 8.3 NG/ML (ref 4.2–19.9)
GLUCOSE SERPL-MCNC: 105 MG/DL (ref 65–99)
GLUCOSE SERPL-MCNC: 146 MG/DL (ref 65–99)
GLUCOSE SERPL-MCNC: 84 MG/DL (ref 65–99)
GLUCOSE UR STRIP.AUTO-MCNC: NORMAL MG/DL
HBA1C MFR BLD: 5.7 %
HCT VFR BLD AUTO: 22.5 % (ref 36–46)
HCT VFR BLD AUTO: 22.7 % (ref 36–46)
HGB BLD-MCNC: 7.7 G/DL (ref 12–16)
HGB BLD-MCNC: 7.9 G/DL (ref 12–16)
HOLD SPECIMEN: NORMAL
IMM GRANULOCYTES # BLD AUTO: 0.32 X10*3/UL (ref 0–0.5)
IMM GRANULOCYTES # BLD AUTO: 0.65 X10*3/UL (ref 0–0.5)
IMM GRANULOCYTES NFR BLD AUTO: 1.6 % (ref 0–0.9)
IMM GRANULOCYTES NFR BLD AUTO: 2.8 % (ref 0–0.9)
IRON SATN MFR SERPL: ABNORMAL %
IRON SERPL-MCNC: <20 UG/DL (ref 30–160)
KETONES UR STRIP.AUTO-MCNC: NEGATIVE MG/DL
LACTATE BLDV-SCNC: 1 MMOL/L (ref 0.4–2)
LEUKOCYTE ESTERASE UR QL STRIP.AUTO: ABNORMAL
LYMPHOCYTES # BLD MANUAL: 0.6 X10*3/UL (ref 0.8–3)
LYMPHOCYTES # BLD MANUAL: 0.92 X10*3/UL (ref 0.8–3)
LYMPHOCYTES NFR BLD MANUAL: 3 %
LYMPHOCYTES NFR BLD MANUAL: 4 %
MAGNESIUM SERPL-MCNC: 1.1 MG/DL (ref 1.6–3.1)
MAGNESIUM SERPL-MCNC: 1.6 MG/DL (ref 1.6–3.1)
MAGNESIUM SERPL-MCNC: 3.1 MG/DL (ref 1.6–3.1)
MCH RBC QN AUTO: 31.7 PG (ref 26–34)
MCH RBC QN AUTO: 32.1 PG (ref 26–34)
MCHC RBC AUTO-ENTMCNC: 34.2 G/DL (ref 32–36)
MCHC RBC AUTO-ENTMCNC: 34.8 G/DL (ref 32–36)
MCV RBC AUTO: 92 FL (ref 80–100)
MCV RBC AUTO: 93 FL (ref 80–100)
METAMYELOCYTES # BLD MANUAL: 0.23 X10*3/UL
METAMYELOCYTES # BLD MANUAL: 0.4 X10*3/UL
METAMYELOCYTES NFR BLD MANUAL: 1 %
METAMYELOCYTES NFR BLD MANUAL: 2 %
MONOCYTES # BLD MANUAL: 0.4 X10*3/UL (ref 0.05–0.8)
MONOCYTES # BLD MANUAL: 0.46 X10*3/UL (ref 0.05–0.8)
MONOCYTES NFR BLD MANUAL: 2 %
MONOCYTES NFR BLD MANUAL: 2 %
NEUTROPHILS # BLD MANUAL: 18.69 X10*3/UL (ref 1.6–5.5)
NEUTROPHILS # BLD MANUAL: 21.48 X10*3/UL (ref 1.6–5.5)
NEUTS BAND # BLD MANUAL: 2.31 X10*3/UL (ref 0–0.5)
NEUTS BAND # BLD MANUAL: 2.61 X10*3/UL (ref 0–0.5)
NEUTS BAND NFR BLD MANUAL: 10 %
NEUTS BAND NFR BLD MANUAL: 13 %
NEUTS SEG # BLD MANUAL: 16.08 X10*3/UL (ref 1.6–5)
NEUTS SEG # BLD MANUAL: 19.17 X10*3/UL (ref 1.6–5)
NEUTS SEG NFR BLD MANUAL: 80 %
NEUTS SEG NFR BLD MANUAL: 83 %
NEUTS VAC BLD QL SMEAR: PRESENT
NEUTS VAC BLD QL SMEAR: PRESENT
NITRITE UR QL STRIP.AUTO: NEGATIVE
NRBC BLD-RTO: 0 /100 WBCS (ref 0–0)
NRBC BLD-RTO: 0 /100 WBCS (ref 0–0)
NT-PROBNP SERPL-MCNC: ABNORMAL PG/ML (ref 0–624)
OVALOCYTES BLD QL SMEAR: ABNORMAL
OVALOCYTES BLD QL SMEAR: ABNORMAL
PH UR STRIP.AUTO: 6 [PH]
PLATELET # BLD AUTO: 115 X10*3/UL (ref 150–450)
PLATELET # BLD AUTO: 130 X10*3/UL (ref 150–450)
PLATELET CLUMP BLD QL SMEAR: PRESENT
POTASSIUM SERPL-SCNC: 3 MMOL/L (ref 3.4–5.1)
POTASSIUM SERPL-SCNC: 3.5 MMOL/L (ref 3.4–5.1)
POTASSIUM SERPL-SCNC: 3.7 MMOL/L (ref 3.4–5.1)
PROT SERPL-MCNC: 5 G/DL (ref 5.9–7.9)
PROT SERPL-MCNC: 5.1 G/DL (ref 5.9–7.9)
PROT SERPL-MCNC: 5.8 G/DL (ref 5.9–7.9)
PROT UR STRIP.AUTO-MCNC: ABNORMAL MG/DL
RBC # BLD AUTO: 2.43 X10*6/UL (ref 4–5.2)
RBC # BLD AUTO: 2.46 X10*6/UL (ref 4–5.2)
RBC # UR STRIP.AUTO: ABNORMAL /UL
RBC #/AREA URNS AUTO: ABNORMAL /HPF
RBC MORPH BLD: ABNORMAL
RBC MORPH BLD: ABNORMAL
SARS-COV-2 RNA RESP QL NAA+PROBE: NOT DETECTED
SODIUM SERPL-SCNC: 126 MMOL/L (ref 133–145)
SODIUM SERPL-SCNC: 126 MMOL/L (ref 133–145)
SODIUM SERPL-SCNC: 127 MMOL/L (ref 133–145)
SP GR UR STRIP.AUTO: 1.01
TIBC SERPL-MCNC: ABNORMAL UG/DL
TOTAL CELLS COUNTED BLD: 100
TOTAL CELLS COUNTED BLD: 100
TROPONIN T SERPL-MCNC: 135 NG/L
TROPONIN T SERPL-MCNC: 157 NG/L
TROPONIN T SERPL-MCNC: 176 NG/L
UIBC SERPL-MCNC: 121 UG/DL (ref 110–370)
UROBILINOGEN UR STRIP.AUTO-MCNC: NORMAL MG/DL
WBC # BLD AUTO: 20.1 X10*3/UL (ref 4.4–11.3)
WBC # BLD AUTO: 23.1 X10*3/UL (ref 4.4–11.3)
WBC #/AREA URNS AUTO: >50 /HPF
WBC CLUMPS #/AREA URNS AUTO: ABNORMAL /HPF

## 2024-08-17 PROCEDURE — 2500000001 HC RX 250 WO HCPCS SELF ADMINISTERED DRUGS (ALT 637 FOR MEDICARE OP)

## 2024-08-17 PROCEDURE — 99291 CRITICAL CARE FIRST HOUR: CPT | Mod: 25

## 2024-08-17 PROCEDURE — 82746 ASSAY OF FOLIC ACID SERUM: CPT

## 2024-08-17 PROCEDURE — 96361 HYDRATE IV INFUSION ADD-ON: CPT

## 2024-08-17 PROCEDURE — 2500000002 HC RX 250 W HCPCS SELF ADMINISTERED DRUGS (ALT 637 FOR MEDICARE OP, ALT 636 FOR OP/ED)

## 2024-08-17 PROCEDURE — 80053 COMPREHEN METABOLIC PANEL: CPT

## 2024-08-17 PROCEDURE — 81001 URINALYSIS AUTO W/SCOPE: CPT | Performed by: EMERGENCY MEDICINE

## 2024-08-17 PROCEDURE — 83036 HEMOGLOBIN GLYCOSYLATED A1C: CPT

## 2024-08-17 PROCEDURE — 85007 BL SMEAR W/DIFF WBC COUNT: CPT

## 2024-08-17 PROCEDURE — 83605 ASSAY OF LACTIC ACID: CPT | Performed by: PHYSICIAN ASSISTANT

## 2024-08-17 PROCEDURE — 83735 ASSAY OF MAGNESIUM: CPT | Performed by: PHYSICIAN ASSISTANT

## 2024-08-17 PROCEDURE — 87040 BLOOD CULTURE FOR BACTERIA: CPT | Mod: WESLAB | Performed by: PHYSICIAN ASSISTANT

## 2024-08-17 PROCEDURE — 85027 COMPLETE CBC AUTOMATED: CPT | Performed by: EMERGENCY MEDICINE

## 2024-08-17 PROCEDURE — 85007 BL SMEAR W/DIFF WBC COUNT: CPT | Performed by: EMERGENCY MEDICINE

## 2024-08-17 PROCEDURE — 74176 CT ABD & PELVIS W/O CONTRAST: CPT | Performed by: RADIOLOGY

## 2024-08-17 PROCEDURE — 36415 COLL VENOUS BLD VENIPUNCTURE: CPT | Performed by: PHYSICIAN ASSISTANT

## 2024-08-17 PROCEDURE — 2500000001 HC RX 250 WO HCPCS SELF ADMINISTERED DRUGS (ALT 637 FOR MEDICARE OP): Performed by: PHYSICIAN ASSISTANT

## 2024-08-17 PROCEDURE — 80053 COMPREHEN METABOLIC PANEL: CPT | Performed by: EMERGENCY MEDICINE

## 2024-08-17 PROCEDURE — 83540 ASSAY OF IRON: CPT

## 2024-08-17 PROCEDURE — 84484 ASSAY OF TROPONIN QUANT: CPT | Performed by: EMERGENCY MEDICINE

## 2024-08-17 PROCEDURE — 36415 COLL VENOUS BLD VENIPUNCTURE: CPT | Performed by: EMERGENCY MEDICINE

## 2024-08-17 PROCEDURE — 71045 X-RAY EXAM CHEST 1 VIEW: CPT | Performed by: RADIOLOGY

## 2024-08-17 PROCEDURE — 87635 SARS-COV-2 COVID-19 AMP PRB: CPT | Performed by: EMERGENCY MEDICINE

## 2024-08-17 PROCEDURE — 2060000001 HC INTERMEDIATE ICU ROOM DAILY

## 2024-08-17 PROCEDURE — 93010 ELECTROCARDIOGRAM REPORT: CPT | Performed by: INTERNAL MEDICINE

## 2024-08-17 PROCEDURE — 74176 CT ABD & PELVIS W/O CONTRAST: CPT

## 2024-08-17 PROCEDURE — 71045 X-RAY EXAM CHEST 1 VIEW: CPT

## 2024-08-17 PROCEDURE — 96368 THER/DIAG CONCURRENT INF: CPT

## 2024-08-17 PROCEDURE — 82330 ASSAY OF CALCIUM: CPT

## 2024-08-17 PROCEDURE — 87086 URINE CULTURE/COLONY COUNT: CPT | Mod: WESLAB | Performed by: EMERGENCY MEDICINE

## 2024-08-17 PROCEDURE — 2500000004 HC RX 250 GENERAL PHARMACY W/ HCPCS (ALT 636 FOR OP/ED): Performed by: PHYSICIAN ASSISTANT

## 2024-08-17 PROCEDURE — 83735 ASSAY OF MAGNESIUM: CPT

## 2024-08-17 PROCEDURE — 96365 THER/PROPH/DIAG IV INF INIT: CPT

## 2024-08-17 PROCEDURE — 82728 ASSAY OF FERRITIN: CPT

## 2024-08-17 PROCEDURE — 70450 CT HEAD/BRAIN W/O DYE: CPT | Performed by: RADIOLOGY

## 2024-08-17 PROCEDURE — 93005 ELECTROCARDIOGRAM TRACING: CPT

## 2024-08-17 PROCEDURE — 2500000004 HC RX 250 GENERAL PHARMACY W/ HCPCS (ALT 636 FOR OP/ED): Performed by: EMERGENCY MEDICINE

## 2024-08-17 PROCEDURE — 83880 ASSAY OF NATRIURETIC PEPTIDE: CPT

## 2024-08-17 PROCEDURE — 2500000004 HC RX 250 GENERAL PHARMACY W/ HCPCS (ALT 636 FOR OP/ED)

## 2024-08-17 PROCEDURE — 70450 CT HEAD/BRAIN W/O DYE: CPT

## 2024-08-17 PROCEDURE — 85027 COMPLETE CBC AUTOMATED: CPT

## 2024-08-17 RX ORDER — PANTOPRAZOLE SODIUM 40 MG/10ML
40 INJECTION, POWDER, LYOPHILIZED, FOR SOLUTION INTRAVENOUS
Status: DISCONTINUED | OUTPATIENT
Start: 2024-08-18 | End: 2024-08-20 | Stop reason: HOSPADM

## 2024-08-17 RX ORDER — SODIUM BICARBONATE 650 MG/1
650 TABLET ORAL 4 TIMES DAILY
Status: DISCONTINUED | OUTPATIENT
Start: 2024-08-17 | End: 2024-08-18

## 2024-08-17 RX ORDER — CALCIUM GLUCONATE 20 MG/ML
1 INJECTION, SOLUTION INTRAVENOUS EVERY 4 HOURS
Status: COMPLETED | OUTPATIENT
Start: 2024-08-17 | End: 2024-08-17

## 2024-08-17 RX ORDER — FERROUS SULFATE 325(65) MG
65 TABLET ORAL
Status: DISCONTINUED | OUTPATIENT
Start: 2024-08-18 | End: 2024-08-20 | Stop reason: HOSPADM

## 2024-08-17 RX ORDER — ATORVASTATIN CALCIUM 10 MG/1
10 TABLET, FILM COATED ORAL NIGHTLY
Status: DISCONTINUED | OUTPATIENT
Start: 2024-08-17 | End: 2024-08-20 | Stop reason: HOSPADM

## 2024-08-17 RX ORDER — SODIUM CHLORIDE AND POTASSIUM CHLORIDE 150; 900 MG/100ML; MG/100ML
100 INJECTION, SOLUTION INTRAVENOUS CONTINUOUS
Status: DISCONTINUED | OUTPATIENT
Start: 2024-08-17 | End: 2024-08-18

## 2024-08-17 RX ORDER — MAGNESIUM SULFATE 1 G/100ML
1 INJECTION INTRAVENOUS ONCE
Status: COMPLETED | OUTPATIENT
Start: 2024-08-17 | End: 2024-08-17

## 2024-08-17 RX ORDER — POTASSIUM CHLORIDE 20 MEQ/1
20 TABLET, EXTENDED RELEASE ORAL ONCE
Status: COMPLETED | OUTPATIENT
Start: 2024-08-17 | End: 2024-08-17

## 2024-08-17 RX ORDER — ONDANSETRON HYDROCHLORIDE 2 MG/ML
4 INJECTION, SOLUTION INTRAVENOUS EVERY 8 HOURS PRN
Status: DISCONTINUED | OUTPATIENT
Start: 2024-08-17 | End: 2024-08-20 | Stop reason: HOSPADM

## 2024-08-17 RX ORDER — PANTOPRAZOLE SODIUM 40 MG/1
40 TABLET, DELAYED RELEASE ORAL
Status: DISCONTINUED | OUTPATIENT
Start: 2024-08-18 | End: 2024-08-20 | Stop reason: HOSPADM

## 2024-08-17 RX ORDER — CEFTRIAXONE 1 G/50ML
1 INJECTION, SOLUTION INTRAVENOUS EVERY 24 HOURS
Status: DISCONTINUED | OUTPATIENT
Start: 2024-08-17 | End: 2024-08-20 | Stop reason: HOSPADM

## 2024-08-17 RX ORDER — HYDROXYZINE HYDROCHLORIDE 25 MG/1
25 TABLET, FILM COATED ORAL ONCE
Status: COMPLETED | OUTPATIENT
Start: 2024-08-17 | End: 2024-08-17

## 2024-08-17 RX ORDER — SEVELAMER CARBONATE 800 MG/1
800 TABLET, FILM COATED ORAL
Status: DISCONTINUED | OUTPATIENT
Start: 2024-08-17 | End: 2024-08-20 | Stop reason: HOSPADM

## 2024-08-17 RX ORDER — CALCITRIOL 0.25 UG/1
0.25 CAPSULE ORAL DAILY
Status: DISCONTINUED | OUTPATIENT
Start: 2024-08-17 | End: 2024-08-19

## 2024-08-17 RX ORDER — CALCITRIOL 0.25 UG/1
0.25 CAPSULE ORAL EVERY OTHER DAY
Status: DISCONTINUED | OUTPATIENT
Start: 2024-08-18 | End: 2024-08-17

## 2024-08-17 RX ORDER — CEFTRIAXONE 1 G/50ML
1 INJECTION, SOLUTION INTRAVENOUS ONCE
Status: COMPLETED | OUTPATIENT
Start: 2024-08-17 | End: 2024-08-17

## 2024-08-17 RX ORDER — SODIUM CHLORIDE 9 MG/ML
60 INJECTION, SOLUTION INTRAVENOUS CONTINUOUS
Status: DISCONTINUED | OUTPATIENT
Start: 2024-08-17 | End: 2024-08-17

## 2024-08-17 RX ORDER — CALCIUM GLUCONATE 20 MG/ML
1 INJECTION, SOLUTION INTRAVENOUS EVERY 4 HOURS
Status: COMPLETED | OUTPATIENT
Start: 2024-08-17 | End: 2024-08-18

## 2024-08-17 RX ADMIN — CALCIUM GLUCONATE 1 G: 20 INJECTION, SOLUTION INTRAVENOUS at 11:56

## 2024-08-17 RX ADMIN — SODIUM BICARBONATE 650 MG TABLET 650 MG: at 21:20

## 2024-08-17 RX ADMIN — SODIUM CHLORIDE AND POTASSIUM CHLORIDE 100 ML/HR: .9; .15 SOLUTION INTRAVENOUS at 18:00

## 2024-08-17 RX ADMIN — MAGNESIUM SULFATE HEPTAHYDRATE 1 G: 1 INJECTION, SOLUTION INTRAVENOUS at 11:56

## 2024-08-17 RX ADMIN — CEFTRIAXONE SODIUM 1 G: 1 INJECTION, SOLUTION INTRAVENOUS at 10:57

## 2024-08-17 RX ADMIN — POTASSIUM CHLORIDE 20 MEQ: 1500 TABLET, EXTENDED RELEASE ORAL at 21:20

## 2024-08-17 RX ADMIN — SODIUM CHLORIDE 500 ML: 900 INJECTION, SOLUTION INTRAVENOUS at 10:57

## 2024-08-17 RX ADMIN — CALCIUM GLUCONATE 1 G: 20 INJECTION, SOLUTION INTRAVENOUS at 21:20

## 2024-08-17 RX ADMIN — SODIUM CHLORIDE 500 ML: 900 INJECTION, SOLUTION INTRAVENOUS at 10:01

## 2024-08-17 RX ADMIN — HYDROXYZINE HYDROCHLORIDE 25 MG: 25 TABLET ORAL at 12:47

## 2024-08-17 RX ADMIN — CALCIUM GLUCONATE 1 G: 20 INJECTION, SOLUTION INTRAVENOUS at 15:20

## 2024-08-17 RX ADMIN — ATORVASTATIN CALCIUM 10 MG: 10 TABLET, FILM COATED ORAL at 21:20

## 2024-08-17 SDOH — SOCIAL STABILITY: SOCIAL NETWORK
DO YOU BELONG TO ANY CLUBS OR ORGANIZATIONS SUCH AS CHURCH GROUPS UNIONS, FRATERNAL OR ATHLETIC GROUPS, OR SCHOOL GROUPS?: NO

## 2024-08-17 SDOH — SOCIAL STABILITY: SOCIAL INSECURITY: WITHIN THE LAST YEAR, HAVE YOU BEEN HUMILIATED OR EMOTIONALLY ABUSED IN OTHER WAYS BY YOUR PARTNER OR EX-PARTNER?: NO

## 2024-08-17 SDOH — ECONOMIC STABILITY: HOUSING INSECURITY: AT ANY TIME IN THE PAST 12 MONTHS, WERE YOU HOMELESS OR LIVING IN A SHELTER (INCLUDING NOW)?: NO

## 2024-08-17 SDOH — SOCIAL STABILITY: SOCIAL INSECURITY: DO YOU FEEL UNSAFE GOING BACK TO THE PLACE WHERE YOU ARE LIVING?: NO

## 2024-08-17 SDOH — ECONOMIC STABILITY: HOUSING INSECURITY: IN THE PAST 12 MONTHS, HOW MANY TIMES HAVE YOU MOVED WHERE YOU WERE LIVING?: 0

## 2024-08-17 SDOH — HEALTH STABILITY: MENTAL HEALTH: HOW MANY STANDARD DRINKS CONTAINING ALCOHOL DO YOU HAVE ON A TYPICAL DAY?: PATIENT DOES NOT DRINK

## 2024-08-17 SDOH — HEALTH STABILITY: PHYSICAL HEALTH: ON AVERAGE, HOW MANY DAYS PER WEEK DO YOU ENGAGE IN MODERATE TO STRENUOUS EXERCISE (LIKE A BRISK WALK)?: 0 DAYS

## 2024-08-17 SDOH — HEALTH STABILITY: MENTAL HEALTH
STRESS IS WHEN SOMEONE FEELS TENSE, NERVOUS, ANXIOUS, OR CAN'T SLEEP AT NIGHT BECAUSE THEIR MIND IS TROUBLED. HOW STRESSED ARE YOU?: NOT AT ALL

## 2024-08-17 SDOH — HEALTH STABILITY: MENTAL HEALTH
HOW OFTEN DO YOU NEED TO HAVE SOMEONE HELP YOU WHEN YOU READ INSTRUCTIONS, PAMPHLETS, OR OTHER WRITTEN MATERIAL FROM YOUR DOCTOR OR PHARMACY?: RARELY

## 2024-08-17 SDOH — SOCIAL STABILITY: SOCIAL INSECURITY: ARE THERE ANY APPARENT SIGNS OF INJURIES/BEHAVIORS THAT COULD BE RELATED TO ABUSE/NEGLECT?: UNABLE TO ASSESS

## 2024-08-17 SDOH — SOCIAL STABILITY: SOCIAL INSECURITY: WITHIN THE LAST YEAR, HAVE YOU BEEN AFRAID OF YOUR PARTNER OR EX-PARTNER?: NO

## 2024-08-17 SDOH — SOCIAL STABILITY: SOCIAL INSECURITY: DOES ANYONE TRY TO KEEP YOU FROM HAVING/CONTACTING OTHER FRIENDS OR DOING THINGS OUTSIDE YOUR HOME?: NO

## 2024-08-17 SDOH — SOCIAL STABILITY: SOCIAL INSECURITY: HAVE YOU HAD THOUGHTS OF HARMING ANYONE ELSE?: NO

## 2024-08-17 SDOH — SOCIAL STABILITY: SOCIAL INSECURITY
WITHIN THE LAST YEAR, HAVE TO BEEN RAPED OR FORCED TO HAVE ANY KIND OF SEXUAL ACTIVITY BY YOUR PARTNER OR EX-PARTNER?: NO

## 2024-08-17 SDOH — SOCIAL STABILITY: SOCIAL INSECURITY: HAVE YOU HAD ANY THOUGHTS OF HARMING ANYONE ELSE?: UNABLE TO ASSESS

## 2024-08-17 SDOH — ECONOMIC STABILITY: INCOME INSECURITY: IN THE LAST 12 MONTHS, WAS THERE A TIME WHEN YOU WERE NOT ABLE TO PAY THE MORTGAGE OR RENT ON TIME?: NO

## 2024-08-17 SDOH — SOCIAL STABILITY: SOCIAL INSECURITY: ABUSE: ADULT

## 2024-08-17 SDOH — SOCIAL STABILITY: SOCIAL INSECURITY
WITHIN THE LAST YEAR, HAVE YOU BEEN KICKED, HIT, SLAPPED, OR OTHERWISE PHYSICALLY HURT BY YOUR PARTNER OR EX-PARTNER?: NO

## 2024-08-17 SDOH — ECONOMIC STABILITY: FOOD INSECURITY: WITHIN THE PAST 12 MONTHS, YOU WORRIED THAT YOUR FOOD WOULD RUN OUT BEFORE YOU GOT MONEY TO BUY MORE.: NEVER TRUE

## 2024-08-17 SDOH — SOCIAL STABILITY: SOCIAL INSECURITY: DOES ANYONE TRY TO KEEP YOU FROM HAVING/CONTACTING OTHER FRIENDS OR DOING THINGS OUTSIDE YOUR HOME?: UNABLE TO ASSESS

## 2024-08-17 SDOH — SOCIAL STABILITY: SOCIAL INSECURITY: ARE YOU OR HAVE YOU BEEN THREATENED OR ABUSED PHYSICALLY, EMOTIONALLY, OR SEXUALLY BY ANYONE?: NO

## 2024-08-17 SDOH — ECONOMIC STABILITY: INCOME INSECURITY: IN THE PAST 12 MONTHS, HAS THE ELECTRIC, GAS, OIL, OR WATER COMPANY THREATENED TO SHUT OFF SERVICE IN YOUR HOME?: NO

## 2024-08-17 SDOH — ECONOMIC STABILITY: INCOME INSECURITY: HOW HARD IS IT FOR YOU TO PAY FOR THE VERY BASICS LIKE FOOD, HOUSING, MEDICAL CARE, AND HEATING?: NOT HARD AT ALL

## 2024-08-17 SDOH — SOCIAL STABILITY: SOCIAL NETWORK
IN A TYPICAL WEEK, HOW MANY TIMES DO YOU TALK ON THE PHONE WITH FAMILY, FRIENDS, OR NEIGHBORS?: MORE THAN THREE TIMES A WEEK

## 2024-08-17 SDOH — HEALTH STABILITY: MENTAL HEALTH: HOW OFTEN DO YOU HAVE 6 OR MORE DRINKS ON ONE OCCASION?: NEVER

## 2024-08-17 SDOH — SOCIAL STABILITY: SOCIAL INSECURITY: ARE THERE ANY APPARENT SIGNS OF INJURIES/BEHAVIORS THAT COULD BE RELATED TO ABUSE/NEGLECT?: NO

## 2024-08-17 SDOH — SOCIAL STABILITY: SOCIAL INSECURITY: HAS ANYONE EVER THREATENED TO HURT YOUR FAMILY OR YOUR PETS?: NO

## 2024-08-17 SDOH — SOCIAL STABILITY: SOCIAL INSECURITY: HAVE YOU HAD ANY THOUGHTS OF HARMING ANYONE ELSE?: NO

## 2024-08-17 SDOH — SOCIAL STABILITY: SOCIAL NETWORK: HOW OFTEN DO YOU ATTEND CHURCH OR RELIGIOUS SERVICES?: NEVER

## 2024-08-17 SDOH — SOCIAL STABILITY: SOCIAL NETWORK: HOW OFTEN DO YOU ATTENT MEETINGS OF THE CLUB OR ORGANIZATION YOU BELONG TO?: NEVER

## 2024-08-17 SDOH — SOCIAL STABILITY: SOCIAL NETWORK: HOW OFTEN DO YOU GET TOGETHER WITH FRIENDS OR RELATIVES?: THREE TIMES A WEEK

## 2024-08-17 SDOH — HEALTH STABILITY: PHYSICAL HEALTH: ON AVERAGE, HOW MANY MINUTES DO YOU ENGAGE IN EXERCISE AT THIS LEVEL?: 0 MIN

## 2024-08-17 SDOH — SOCIAL STABILITY: SOCIAL NETWORK: ARE YOU MARRIED, WIDOWED, DIVORCED, SEPARATED, NEVER MARRIED, OR LIVING WITH A PARTNER?: WIDOWED

## 2024-08-17 SDOH — ECONOMIC STABILITY: TRANSPORTATION INSECURITY
IN THE PAST 12 MONTHS, HAS LACK OF TRANSPORTATION KEPT YOU FROM MEETINGS, WORK, OR FROM GETTING THINGS NEEDED FOR DAILY LIVING?: NO

## 2024-08-17 SDOH — SOCIAL STABILITY: SOCIAL INSECURITY: ARE YOU OR HAVE YOU BEEN THREATENED OR ABUSED PHYSICALLY, EMOTIONALLY, OR SEXUALLY BY ANYONE?: UNABLE TO ASSESS

## 2024-08-17 SDOH — HEALTH STABILITY: MENTAL HEALTH: HOW OFTEN DO YOU HAVE A DRINK CONTAINING ALCOHOL?: NEVER

## 2024-08-17 SDOH — ECONOMIC STABILITY: FOOD INSECURITY: WITHIN THE PAST 12 MONTHS, THE FOOD YOU BOUGHT JUST DIDN'T LAST AND YOU DIDN'T HAVE MONEY TO GET MORE.: NEVER TRUE

## 2024-08-17 SDOH — SOCIAL STABILITY: SOCIAL INSECURITY: HAS ANYONE EVER THREATENED TO HURT YOUR FAMILY OR YOUR PETS?: UNABLE TO ASSESS

## 2024-08-17 SDOH — SOCIAL STABILITY: SOCIAL INSECURITY: DO YOU FEEL UNSAFE GOING BACK TO THE PLACE WHERE YOU ARE LIVING?: UNABLE TO ASSESS

## 2024-08-17 SDOH — SOCIAL STABILITY: SOCIAL INSECURITY: DO YOU FEEL ANYONE HAS EXPLOITED OR TAKEN ADVANTAGE OF YOU FINANCIALLY OR OF YOUR PERSONAL PROPERTY?: UNABLE TO ASSESS

## 2024-08-17 SDOH — SOCIAL STABILITY: SOCIAL INSECURITY: WERE YOU ABLE TO COMPLETE ALL THE BEHAVIORAL HEALTH SCREENINGS?: YES

## 2024-08-17 SDOH — ECONOMIC STABILITY: TRANSPORTATION INSECURITY
IN THE PAST 12 MONTHS, HAS THE LACK OF TRANSPORTATION KEPT YOU FROM MEDICAL APPOINTMENTS OR FROM GETTING MEDICATIONS?: NO

## 2024-08-17 SDOH — SOCIAL STABILITY: SOCIAL INSECURITY: DO YOU FEEL ANYONE HAS EXPLOITED OR TAKEN ADVANTAGE OF YOU FINANCIALLY OR OF YOUR PERSONAL PROPERTY?: NO

## 2024-08-17 ASSESSMENT — COGNITIVE AND FUNCTIONAL STATUS - GENERAL
STANDING UP FROM CHAIR USING ARMS: A LITTLE
MOBILITY SCORE: 8
WALKING IN HOSPITAL ROOM: A LITTLE
DAILY ACTIVITIY SCORE: 24
DRESSING REGULAR LOWER BODY CLOTHING: A LITTLE
WALKING IN HOSPITAL ROOM: A LITTLE
CLIMB 3 TO 5 STEPS WITH RAILING: A LITTLE
TURNING FROM BACK TO SIDE WHILE IN FLAT BAD: TOTAL
MOBILITY SCORE: 18
CLIMB 3 TO 5 STEPS WITH RAILING: TOTAL
TOILETING: A LITTLE
PATIENT BASELINE BEDBOUND: NO
TURNING FROM BACK TO SIDE WHILE IN FLAT BAD: A LITTLE
PERSONAL GROOMING: A LITTLE
MOVING FROM LYING ON BACK TO SITTING ON SIDE OF FLAT BED WITH BEDRAILS: A LITTLE
STANDING UP FROM CHAIR USING ARMS: TOTAL
MOVING TO AND FROM BED TO CHAIR: TOTAL
DRESSING REGULAR UPPER BODY CLOTHING: A LITTLE
MOVING TO AND FROM BED TO CHAIR: A LITTLE
DAILY ACTIVITIY SCORE: 19
HELP NEEDED FOR BATHING: A LITTLE
MOVING FROM LYING ON BACK TO SITTING ON SIDE OF FLAT BED WITH BEDRAILS: TOTAL

## 2024-08-17 ASSESSMENT — LIFESTYLE VARIABLES
PRESCIPTION_ABUSE_PAST_12_MONTHS: NO
TOTAL SCORE: 0
SKIP TO QUESTIONS 9-10: 1
HOW OFTEN DO YOU HAVE A DRINK CONTAINING ALCOHOL: NEVER
SUBSTANCE_ABUSE_PAST_12_MONTHS: NO
AUDIT-C TOTAL SCORE: 0
HAVE YOU EVER FELT YOU SHOULD CUT DOWN ON YOUR DRINKING: NO
AUDIT-C TOTAL SCORE: 0
SUBSTANCE_ABUSE_PAST_12_MONTHS: NO
SKIP TO QUESTIONS 9-10: 1
AUDIT-C TOTAL SCORE: 0
EVER HAD A DRINK FIRST THING IN THE MORNING TO STEADY YOUR NERVES TO GET RID OF A HANGOVER: NO
HOW MANY STANDARD DRINKS CONTAINING ALCOHOL DO YOU HAVE ON A TYPICAL DAY: PATIENT DOES NOT DRINK
HOW OFTEN DO YOU HAVE 6 OR MORE DRINKS ON ONE OCCASION: NEVER
HAVE PEOPLE ANNOYED YOU BY CRITICIZING YOUR DRINKING: NO
PRESCIPTION_ABUSE_PAST_12_MONTHS: NO
EVER FELT BAD OR GUILTY ABOUT YOUR DRINKING: NO

## 2024-08-17 ASSESSMENT — COLUMBIA-SUICIDE SEVERITY RATING SCALE - C-SSRS
1. IN THE PAST MONTH, HAVE YOU WISHED YOU WERE DEAD OR WISHED YOU COULD GO TO SLEEP AND NOT WAKE UP?: NO
6. HAVE YOU EVER DONE ANYTHING, STARTED TO DO ANYTHING, OR PREPARED TO DO ANYTHING TO END YOUR LIFE?: NO
2. HAVE YOU ACTUALLY HAD ANY THOUGHTS OF KILLING YOURSELF?: NO
6. HAVE YOU EVER DONE ANYTHING, STARTED TO DO ANYTHING, OR PREPARED TO DO ANYTHING TO END YOUR LIFE?: NO
1. IN THE PAST MONTH, HAVE YOU WISHED YOU WERE DEAD OR WISHED YOU COULD GO TO SLEEP AND NOT WAKE UP?: NO
2. HAVE YOU ACTUALLY HAD ANY THOUGHTS OF KILLING YOURSELF?: NO

## 2024-08-17 ASSESSMENT — ACTIVITIES OF DAILY LIVING (ADL)
DRESSING YOURSELF: INDEPENDENT
JUDGMENT_ADEQUATE_SAFELY_COMPLETE_DAILY_ACTIVITIES: YES
ADEQUATE_TO_COMPLETE_ADL: YES
HEARING - RIGHT EAR: FUNCTIONAL
PATIENT'S MEMORY ADEQUATE TO SAFELY COMPLETE DAILY ACTIVITIES?: YES
PATIENT'S MEMORY ADEQUATE TO SAFELY COMPLETE DAILY ACTIVITIES?: YES
FEEDING YOURSELF: INDEPENDENT
BATHING: INDEPENDENT
JUDGMENT_ADEQUATE_SAFELY_COMPLETE_DAILY_ACTIVITIES: YES
HEARING - LEFT EAR: FUNCTIONAL
ASSISTIVE_DEVICE: EYEGLASSES;CANE
BATHING: INDEPENDENT
FEEDING YOURSELF: INDEPENDENT
TOILETING: INDEPENDENT
WALKS IN HOME: INDEPENDENT
TOILETING: INDEPENDENT
GROOMING: INDEPENDENT
DRESSING YOURSELF: INDEPENDENT
GROOMING: INDEPENDENT
HEARING - RIGHT EAR: FUNCTIONAL
ASSISTIVE_DEVICE: EYEGLASSES;CANE
HEARING - LEFT EAR: FUNCTIONAL
WALKS IN HOME: INDEPENDENT
ADEQUATE_TO_COMPLETE_ADL: YES

## 2024-08-17 ASSESSMENT — ENCOUNTER SYMPTOMS
NAUSEA: 1
CHILLS: 1
ENDOCRINE NEGATIVE: 1
PSYCHIATRIC NEGATIVE: 1
MUSCULOSKELETAL NEGATIVE: 1
FATIGUE: 1
APPETITE CHANGE: 1
DIARRHEA: 1
WEAKNESS: 1
HEMATOLOGIC/LYMPHATIC NEGATIVE: 1
EYES NEGATIVE: 1
CARDIOVASCULAR NEGATIVE: 1
ALLERGIC/IMMUNOLOGIC NEGATIVE: 1
RESPIRATORY NEGATIVE: 1
ACTIVITY CHANGE: 1

## 2024-08-17 ASSESSMENT — PATIENT HEALTH QUESTIONNAIRE - PHQ9
SUM OF ALL RESPONSES TO PHQ9 QUESTIONS 1 & 2: 0
1. LITTLE INTEREST OR PLEASURE IN DOING THINGS: NOT AT ALL
2. FEELING DOWN, DEPRESSED OR HOPELESS: NOT AT ALL

## 2024-08-17 ASSESSMENT — PAIN SCALES - GENERAL
PAINLEVEL_OUTOF10: 0 - NO PAIN
PAINLEVEL_OUTOF10: 0 - NO PAIN

## 2024-08-17 ASSESSMENT — PAIN - FUNCTIONAL ASSESSMENT
PAIN_FUNCTIONAL_ASSESSMENT: 0-10
PAIN_FUNCTIONAL_ASSESSMENT: 0-10

## 2024-08-17 NOTE — H&P
History Of Present Illness  Annetta Miguel is a 89 y.o. female presenting with diarrhea.    89 year old female with a PMHx significant for Chronic Kidney Disease IV, Hypertension, presents to [facility] for chief complaint of diarrhea and fatigue. Present bedside is the patient's adult daughter who assists with history.    Patient states symptoms began 3-4 days ago. She reports multiple episode of liquid diarrhea a day. She denies bloody or tarry stools. Denies fever. Endorses nausea/no vomiting and chills. She denies recent illness or sick contacts. States she is a resident of [facility] and commonly eats in close quarters with her fellow residents.    She states that this morning, she was in her senior-living facility apartment ambulating to the bathroom with her cane when she lost her balance, falling on her bottom. She denies hitting her head, denies any changes/loss of consciousness, denies associated chest pain/discomfort, diaphoresis, mental status changes--does not appear to be syncopal episode.  She states that her life alert necklace was nearby and was able to reach it to notify EMS.    Lab findings revealed multiple electrolyte imbalances, and abnormal coagulation profile.     High-sensitivity troponins elevated but flat pattern: 176, 157 and 135.  CT abdomen pelvis has been reviewed and reveals sigmoid diverticulosis without definitive diverticulitis, and findings that may be consistent with focal ileus or partial SBO.    Cardiology, nephrology and general surgery services have been consulted. Will admit under hospitalist service for further management.      Past Medical History  Past Medical History:   Diagnosis Date    Cervical stenosis of spine     CKD (chronic kidney disease)     HLD (hyperlipidemia)     Hypertension     Insomnia     Osteoporosis        Surgical History  History reviewed. No pertinent surgical history.     Social History  She reports  that she has never smoked. She has never been exposed to tobacco smoke. She has never used smokeless tobacco. She reports that she does not currently use alcohol. She reports that she does not use drugs.    Family History  Family History   Problem Relation Name Age of Onset    Stroke Mother      Other (Heart Condition) Father      Diabetes Father      Heart disease Father      Other (Intestinal Complications) Sister      Parkinsonism Brother          Allergies  Oxycodone-acetaminophen, Other, and Oxycodone    Review of Systems   Constitutional:  Positive for activity change, appetite change, chills and fatigue.   HENT: Negative.     Eyes: Negative.    Respiratory: Negative.     Cardiovascular: Negative.    Gastrointestinal:  Positive for diarrhea and nausea.   Endocrine: Negative.    Genitourinary: Negative.    Musculoskeletal: Negative.    Skin: Negative.    Allergic/Immunologic: Negative.    Neurological:  Positive for weakness.   Hematological: Negative.    Psychiatric/Behavioral: Negative.     All other systems reviewed and are negative.       Physical Exam  Vitals and nursing note reviewed.   Constitutional:       Appearance: Normal appearance.      Comments: Mildly disheveled looking   HENT:      Head: Normocephalic and atraumatic.      Mouth/Throat:      Mouth: Mucous membranes are moist.   Eyes:      Extraocular Movements: Extraocular movements intact.      Pupils: Pupils are equal, round, and reactive to light.   Cardiovascular:      Rate and Rhythm: Normal rate and regular rhythm.      Pulses: Normal pulses.      Heart sounds: Normal heart sounds.   Pulmonary:      Effort: Pulmonary effort is normal.      Breath sounds: Normal breath sounds.   Abdominal:      General: Bowel sounds are normal.      Palpations: Abdomen is soft.   Musculoskeletal:         General: Normal range of motion.      Cervical back: Normal range of motion and neck supple.      Right lower leg: Edema present.      Left lower leg: Edema  "present.      Comments: +1 BLLE edema   Skin:     General: Skin is warm and dry.      Capillary Refill: Capillary refill takes less than 2 seconds.   Neurological:      General: No focal deficit present.      Mental Status: She is alert and oriented to person, place, and time.   Psychiatric:         Mood and Affect: Mood normal.          Last Recorded Vitals  Blood pressure 113/50, pulse 90, temperature 37.1 °C (98.8 °F), temperature source Oral, resp. rate 19, height 1.575 m (5' 2\"), weight 63.5 kg (140 lb), SpO2 97%.    Relevant Results      Imaging reviewed.    CT abdomen pelvis wo IV contrast    Result Date: 8/17/2024  Interpreted By:  Aldo Vidal, STUDY: CT ABDOMEN PELVIS WO IV CONTRAST;  8/17/2024 12:22 pm   INDICATION: Signs/Symptoms:diarrhea with leukocytosis.   COMPARISON: None   ACCESSION NUMBER(S): [id number]   ORDERING CLINICIAN: JEAN MARIE SIMON   TECHNIQUE: CT of the abdomen and pelvis was performed. Contiguous axial images were obtained at 3 mm slice thickness through the abdomen and pelvis. Coronal and sagittal reconstructions at 3 mm slice thickness were performed.  No intravenous contrast was administered.   FINDINGS: Please note that the evaluation of vessels, lymph nodes and organs is limited without intravenous contrast.   LOWER CHEST: The lung bases demonstrate hyperinflation. Right basilar pleural thickening and posterior infiltrate. Punctate subpleural nodule in right middle lobe may represent punctate scarring. Calcified granulomas in the lingula.   ABDOMEN:   LIVER: The liver is remarkable for multiple hypodensities which can not be characterized without IV contrast. These may represent simple cysts.   BILE DUCTS: No bile duct dilatation.   GALLBLADDER: Prior cholecystectomy.   PANCREAS: Unremarkable pancreas.   SPLEEN: Punctate calcified splenic granulomas.   ADRENAL GLANDS: No adrenal masses.     KIDNEYS AND URETERS: Multiple renal hypodensities. These are too numerous to count. They " may represent combination of simple and complex cysts but not fully characterized without IV contrast.   PELVIS:   BLADDER: Mostly obscured by the total right hip arthroplasty.   REPRODUCTIVE ORGANS: No definite masses.   BOWEL: Multiple fluid-filled dilated loops of jejunum. Few nondilated loops of more distal ileum. Findings may due partial small bowel obstruction. No definite focal inflammatory changes. Diverticulosis without definite diverticulitis. Intraluminal fat density in a loop of bowel in the right aspect of the abdomen likely within the ascending colon may represent focal intussusception of unknown etiology or significance. Short-term follow-up CT to further evaluate or following oral contrast as warranted. No evidence of associated obstruction.   VESSELS: Aortic calcification and tortuosity.   PERITONEUM/RETROPERITONEUM/LYMPH NODES: No retroperitoneal adenopathy. No ascites.   ABDOMINAL WALL: Small fat containing umbilical hernia.       BONES: Multilevel discogenic degenerative changes. Compression fracture deformity of L1 vertebral body of indeterminate age.       Sigmoid diverticulosis without definite diverticulitis.   Multiple fluid-filled and slightly dilated loops of jejunum. Nondilated loops of ileum. Findings may be due to focal ileus or partial small bowel obstruction. Follow-up as clinically warranted.   Intraluminal fat density in a loop of bowel in the right aspect of the abdomen likely within the ascending colon may represent focal intussusception of unknown etiology or significance. Short-term follow-up CT to further evaluate or following oral contrast as warranted. No evidence of associated obstruction.   Numerous hypodensities within the liver and both kidneys may represent combination of simple and complex cysts but not fully characterized without IV contrast. Follow-up nonemergent triple phase CT or MRI of the liver and kidneys as clinically warranted.   Compression fracture deformity  of L1 vertebral body of indeterminate age.   Right basilar infiltrate and effusion. Follow-up is recommended   MACRO: None   Signed by: Aldo Vidal 8/17/2024 1:57 PM Dictation workstation:   [id number]    XR chest 1 view    Result Date: 8/17/2024  Interpreted By:  Aaron Biswas, STUDY: XR CHEST 1 VIEW;  8/17/2024 11:08 am   INDICATION: Signs/Symptoms:malaise/fatigue.   COMPARISON: Chest radiograph 08/09/2018   ACCESSION NUMBER(S): [id number]   ORDERING CLINICIAN: NJ SPENCE   FINDINGS:     CARDIOMEDIASTINAL SILHOUETTE: Cardiomediastinal silhouette is stable in size and configuration.   LUNGS: No consolidation, pneumothorax, or significant effusion. Subcentimeter calcified nodule left lung base compatible with old granulomatous disease.   ABDOMEN: No remarkable upper abdominal findings.   BONES: No acute osseous changes.       1.  No evidence of acute cardiopulmonary process.     Signed by: Aaron Biswas 8/17/2024 12:34 PM Dictation workstation:   [id number]    CT cervical spine wo IV contrast    Result Date: 8/3/2024  * * *Final Report* * * DATE OF EXAM: Aug  3 2024 12:51PM   [facility]   [id number]  -  CT CERVICAL SPINE WO IVCON  / ACCESSION #  [id number] PROCEDURE REASON: Spine fracture, cervical, traumatic      * * * * Physician Interpretation * * * * RESULT: EXAMINATION:  CT CERVICAL SPINE WO IVCON CLINICAL HISTORY:  Spine fracture, cervical, traumatic TECHNIQUE:  Spiral, high resolution axial unenhanced images were obtained from the skull base to the cervicothoracic junction with sagittal and coronal planar reconstructions.  MQ: [id number] CT Radiation dose: Integrated CT Dose-Length Product (DLP) for this visit =  378 mGy*cm CT Dose Reduction Employed: Automated exposure control(AEC) and iterative recon COMPARISON: None. RESULT: Counting reference:  Craniocervical junction.   Anatomic Variants:  None.  (topogram) images:  Unremarkable. Alignment:    Reversal of the normal cervical lordosis with the apex at C5,  degenerative. Craniocervical junction: Degenerative changes at the atlantooccipital joint.  Craniocervical junction is otherwise normal. Osseous structures/fracture: Severe degenerative endplate changes at C5-C6 with chronic-appearing wedge deformity of the C5 and C6 vertebral bodies.  No evidence of a lytic or blastic process in the visualized spine.  No evidence of acute or chronic fracture. Cervical soft tissues: Multinodular goiter.  The paraspinal soft tissues are within normal limits. Degenerative changes: Multilevel degenerative changes of the cervical spine with up to moderate spinal canal stenosis at C5-6 and C6-7 due to disc osteophyte complex.  Severe foraminal stenosis on the left at C5-6, bilaterally at C6-7, and on the right at C7-T1 due to uncovertebral and facet arthropathy.    IMPRESSION: No acute fracture or traumatic listhesis in the cervical spine. Degenerative changes as described, worst at C5-6. Anatomic Variant:  None.  Assume 7 cervical vertebrae with counting from the craniocervical junction. Transcribed Using Voice Recognition Transcribe Date/Time: Aug  3 2024 12:52P Dictated by: FANY DAY MD This examination was interpreted and the report reviewed and electronically signed by: SANG CHACON MD on Aug  3 2024  1:12PM  EST         Results for orders placed or performed during the hospital encounter of 08/17/24 (from the past 24 hour(s))   CBC and Auto Differential   Result Value Ref Range    WBC 23.1 (H) 4.4 - 11.3 x10*3/uL    nRBC 0.0 0.0 - 0.0 /100 WBCs    RBC 2.46 (L) 4.00 - 5.20 x10*6/uL    Hemoglobin 7.9 (L) 12.0 - 16.0 g/dL    Hematocrit 22.7 (L) 36.0 - 46.0 %    MCV 92 80 - 100 fL    MCH 32.1 26.0 - 34.0 pg    MCHC 34.8 32.0 - 36.0 g/dL    RDW 12.8 11.5 - 14.5 %    Platelets 130 (L) 150 - 450 x10*3/uL    Immature Granulocytes %, Automated 2.8 (H) 0.0 - 0.9 %    Immature Granulocytes Absolute, Automated 0.65 (H) 0.00 - 0.50 x10*3/uL   Comprehensive metabolic panel   Result Value  Ref Range    Glucose 105 (H) 65 - 99 mg/dL    Sodium 126 (L) 133 - 145 mmol/L    Potassium 3.5 3.4 - 5.1 mmol/L    Chloride 93 (L) 97 - 107 mmol/L    Bicarbonate 14 (L) 24 - 31 mmol/L    Urea Nitrogen 65 (H) 8 - 25 mg/dL    Creatinine 4.40 (H) 0.40 - 1.60 mg/dL    eGFR 9 (L) >60 mL/min/1.73m*2    Calcium 5.8 (LL) 8.5 - 10.4 mg/dL    Albumin 3.0 (L) 3.5 - 5.0 g/dL    Alkaline Phosphatase 65 35 - 125 U/L    Total Protein 5.8 (L) 5.9 - 7.9 g/dL    AST 27 5 - 40 U/L    Bilirubin, Total 0.9 0.1 - 1.2 mg/dL    ALT 15 5 - 40 U/L    Anion Gap 19 <=19 mmol/L   Sars-CoV-2 PCR   Result Value Ref Range    Coronavirus 2019, PCR Not Detected Not Detected   Serial Troponin, Initial ([facility])   Result Value Ref Range    Troponin T, High Sensitivity 176 (HH) <=14 ng/L   Manual Differential   Result Value Ref Range    Neutrophils %, Manual 83.0 40.0 - 80.0 %    Bands %, Manual 10.0 0.0 - 5.0 %    Lymphocytes %, Manual 4.0 13.0 - 44.0 %    Monocytes %, Manual 2.0 2.0 - 10.0 %    Eosinophils %, Manual 0.0 0.0 - 6.0 %    Basophils %, Manual 0.0 0.0 - 2.0 %    Metamyelocytes %, Manual 1.0 0.0 - 0.0 %    Seg Neutrophils Absolute, Manual 19.17 (H) 1.60 - 5.00 x10*3/uL    Bands Absolute, Manual 2.31 (H) 0.00 - 0.50 x10*3/uL    Lymphocytes Absolute, Manual 0.92 0.80 - 3.00 x10*3/uL    Monocytes Absolute, Manual 0.46 0.05 - 0.80 x10*3/uL    Eosinophils Absolute, Manual 0.00 0.00 - 0.40 x10*3/uL    Basophils Absolute, Manual 0.00 0.00 - 0.10 x10*3/uL    Metamyelocytes Absolute, Manual 0.23 0.00 - 0.00 x10*3/uL    Total Cells Counted 100     Neutrophils Absolute, Manual 21.48 (H) 1.60 - 5.50 x10*3/uL    RBC Morphology See Below     Ovalocytes Few     John Cells Many     Dohle Bodies Present     Vacuolated Neutrophils Present     Clumped Platelets Present    Magnesium   Result Value Ref Range    Magnesium 1.10 (L) 1.60 - 3.10 mg/dL   Urinalysis with Reflex Culture and Microscopic   Result Value Ref Range    Color, Urine Yellow Light-Yellow, Yellow,  Dark-Yellow    Appearance, Urine Turbid (N) Clear    Specific Gravity, Urine 1.012 1.005 - 1.035    pH, Urine 6.0 5.0, 5.5, 6.0, 6.5, 7.0, 7.5, 8.0    Protein, Urine 300 (3+) (A) NEGATIVE, 10 (TRACE), 20 (TRACE) mg/dL    Glucose, Urine Normal Normal mg/dL    Blood, Urine 1.0 (3+) (A) NEGATIVE    Ketones, Urine NEGATIVE NEGATIVE mg/dL    Bilirubin, Urine NEGATIVE NEGATIVE    Urobilinogen, Urine Normal Normal mg/dL    Nitrite, Urine NEGATIVE NEGATIVE    Leukocyte Esterase, Urine 500 Jessica/µL (A) NEGATIVE   Microscopic Only, Urine   Result Value Ref Range    WBC, Urine >50 (A) 1-5, NONE /HPF    WBC Clumps, Urine FEW Reference range not established. /HPF    RBC, Urine 6-10 (A) NONE, 1-2, 3-5 /HPF    Bacteria, Urine 2+ (A) NONE SEEN /HPF   Serial Troponin, 2 Hour ([facility])   Result Value Ref Range    Troponin T, High Sensitivity 157 (HH) <=14 ng/L   Blood Gas Lactic Acid, Venous   Result Value Ref Range    POCT Lactate, Venous 1.0 0.4 - 2.0 mmol/L   Serial Troponin, 6 Hour ([facility])   Result Value Ref Range    Troponin T, High Sensitivity 135 (HH) <=14 ng/L   Comprehensive metabolic panel   Result Value Ref Range    Glucose 146 (H) 65 - 99 mg/dL    Sodium 127 (L) 133 - 145 mmol/L    Potassium 3.0 (L) 3.4 - 5.1 mmol/L    Chloride 95 (L) 97 - 107 mmol/L    Bicarbonate 13 (L) 24 - 31 mmol/L    Urea Nitrogen 62 (H) 8 - 25 mg/dL    Creatinine 3.70 (H) 0.40 - 1.60 mg/dL    eGFR 11 (L) >60 mL/min/1.73m*2    Calcium 5.5 (LL) 8.5 - 10.4 mg/dL    Albumin 2.6 (L) 3.5 - 5.0 g/dL    Alkaline Phosphatase 51 35 - 125 U/L    Total Protein 5.0 (L) 5.9 - 7.9 g/dL    AST 25 5 - 40 U/L    Bilirubin, Total 0.6 0.1 - 1.2 mg/dL    ALT 12 5 - 40 U/L    Anion Gap 19 <=19 mmol/L   CBC and Auto Differential   Result Value Ref Range    WBC 20.1 (H) 4.4 - 11.3 x10*3/uL    nRBC 0.0 0.0 - 0.0 /100 WBCs    RBC 2.43 (L) 4.00 - 5.20 x10*6/uL    Hemoglobin 7.7 (L) 12.0 - 16.0 g/dL    Hematocrit 22.5 (L) 36.0 - 46.0 %    MCV 93 80 - 100 fL    MCH 31.7 26.0 - 34.0  pg    MCHC 34.2 32.0 - 36.0 g/dL    RDW 12.7 11.5 - 14.5 %    Platelets 115 (L) 150 - 450 x10*3/uL    Immature Granulocytes %, Automated 1.6 (H) 0.0 - 0.9 %    Immature Granulocytes Absolute, Automated 0.32 0.00 - 0.50 x10*3/uL   Magnesium   Result Value Ref Range    Magnesium 3.10 1.60 - 3.10 mg/dL   Manual Differential   Result Value Ref Range    Neutrophils %, Manual 80.0 40.0 - 80.0 %    Bands %, Manual 13.0 0.0 - 5.0 %    Lymphocytes %, Manual 3.0 13.0 - 44.0 %    Monocytes %, Manual 2.0 2.0 - 10.0 %    Eosinophils %, Manual 0.0 0.0 - 6.0 %    Basophils %, Manual 0.0 0.0 - 2.0 %    Metamyelocytes %, Manual 2.0 0.0 - 0.0 %    Seg Neutrophils Absolute, Manual 16.08 (H) 1.60 - 5.00 x10*3/uL    Bands Absolute, Manual 2.61 (H) 0.00 - 0.50 x10*3/uL    Lymphocytes Absolute, Manual 0.60 (L) 0.80 - 3.00 x10*3/uL    Monocytes Absolute, Manual 0.40 0.05 - 0.80 x10*3/uL    Eosinophils Absolute, Manual 0.00 0.00 - 0.40 x10*3/uL    Basophils Absolute, Manual 0.00 0.00 - 0.10 x10*3/uL    Metamyelocytes Absolute, Manual 0.40 0.00 - 0.00 x10*3/uL    Total Cells Counted 100     Neutrophils Absolute, Manual 18.69 (H) 1.60 - 5.50 x10*3/uL    RBC Morphology See Below     Ovalocytes Few     John Cells Many     Dohle Bodies Present     Vacuolated Neutrophils Present       Lab work reviewed.    Assessment/Plan   Assessment & Plan  Diarrhea      Diarrhea  Abnormal CT abdomen pelvis, R/O partial SBO  Acute dehydration  Hyponatremia, hypomagnesia, hypokalemia, hypocalcemia  Stool panel, C. difficile PCR has been ordered  R/o Isolation precautions  Replace electrolyte imbalances  Has received fluid boluses in the ED  With concurrent CKD stage IV--I have spoken with her nephrologist about fluid volume recommendations.  Will initiate IV NS with 20meqKCl  Continue home bicarb treatment  Continue home calcitriol--per patient has been increased to qdaily  IV calcium gluconate has been ordered, continue  Repeat values remain low--obtain ionized  calcium  Monitor on continuous telemetry  Monitor intake/output  Have consulted general surgery; rule out partial SBO  Repeat CMP tonight at 2000 and tomorrow a.m.    Unwitnessed mechanical fall  Generalized weakness  Does not appear to be syncopal event  Patient states she does not fall frequently--her first fall this year  Will obtain CT/head   Neurochecks  PT OT  Fall precautions    JET on CKD stage IV  Not on dialysis  Current with Dr. Littlejohn--consult has been requested  Cr 3.7 --baseline appears to hover~3.2  Monitor renal function daily, renally dose meds, avoid nephrotoxic medication when possible  Maintenance fluids as described above    Acute on chronic anemia  Thrombocytopenia  H&H 7.7/22.5--hgb 9.3 on July, 2024  Have ordered iron panel, folate, ferritin  Repeat CBC in the a.m. and daily thereafter  Monitor for signs of bleeding    Leukocytosis  Improving  23.1-->20.1  Presently afebrile, vital signs stable  Monitor vital signs, monitor CBC closely  Ceftriaxone has been initiated--continue  Follow cultures: blood cultures/urine cultures pending    Pyuria  Plan as above  Bladder scan as well    Right basilar infiltrate and effusion  Appears comfortable on RA presently  Sputum culture when able  Encourage incentive spirometry  Monitor respiratory status    Elevated troponin  Mild bilateral lower extremity edema  High-sensitivity trops 176, 157, 135 respectively  Likely secondary to CKD and comorbidities as described above  Patient currently denies chest pain  EKG NSR and nonischemic  Monitor on telemetry  Ordered proBNP  Patient does not see a cardiologist.  Will consult cardiology.  Have ordered echo    History of hypertension  Actually with soft blood pressures currently; hold home amlodipine, losartan, Lasix for now  Monitor vital signs    Hyperlipidemia  Continue home statin    DVT/GI   SCDs, PPI    Dispo: Pending PT/OT eval.  Patient is currently a resident at [facility].    CODE STATUS  has been discussed with the patient. Adult daughter, Taina mosher and adult son Christopher--reported medical power of --present via phone.  Patient appears to be competent and capable decision-maker.  Patient has elected to be DNR no intubation and no ICU.           I spent 70 minutes in the professional and overall care of this patient.      Karo Cowan, APRN-CNP

## 2024-08-17 NOTE — PROGRESS NOTES
Annetta Miguel is a 89 y.o. female on day 0 of admission presenting with Diarrhea.       08/17/24 [id number]   Discharge Planning   Living Arrangements Alone;Other (Comment)  (Venkatesh DAILY)   Support Systems Children;Friends/neighbors   Assistance Needed Walker   Type of Residence Private residence   Number of Stairs to Enter Residence 0   Number of Stairs Within Residence 0   Do you have animals or pets at home? No   Who is requesting discharge planning? Provider   Home or Post Acute Services In home services   Type of Home Care Services Home nursing visits;Home OT;Home PT   Expected Discharge Disposition  Services   Does the patient need discharge transport arranged? Yes   RoundTrip coordination needed? Yes   Has discharge transport been arranged? No   Patient Choice   Provider Choice list and CMS website (https://medicare.gov/care-compare#search) for post-acute Quality and Resource Measure Data were provided and reviewed with: Family   Patient / Family choosing to utilize agency / facility established prior to hospitalization No         Yessi Christina RN

## 2024-08-17 NOTE — CARE PLAN
The patient's goals for the shift include      The clinical goals for the shift include replete electrolytes    Over the shift, the patient did not make progress toward the following goals. Barriers to progression include none. Recommendations to address these barriers include monitor labs.

## 2024-08-17 NOTE — PROGRESS NOTES
Annetta Miguel is a 89 y.o. female on day 0 of admission presenting with Diarrhea.       08/17/24 1542   Physical Activity   On average, how many days per week do you engage in moderate to strenuous exercise (like a brisk walk)? 0 days   On average, how many minutes do you engage in exercise at this level? 0 min   Financial Resource Strain   How hard is it for you to pay for the very basics like food, housing, medical care, and heating? Not hard   Housing Stability   In the last 12 months, was there a time when you were not able to pay the mortgage or rent on time? N   In the past 12 months, how many times have you moved where you were living? 0   At any time in the past 12 months, were you homeless or living in a shelter (including now)? N   Transportation Needs   In the past 12 months, has lack of transportation kept you from medical appointments or from getting medications? no   In the past 12 months, has lack of transportation kept you from meetings, work, or from getting things needed for daily living? No   Food Insecurity   Within the past 12 months, you worried that your food would run out before you got the money to buy more. Never true   Within the past 12 months, the food you bought just didn't last and you didn't have money to get more. Never true   Stress   Do you feel stress - tense, restless, nervous, or anxious, or unable to sleep at night because your mind is troubled all the time - these days? Not at all   Social Connections   In a typical week, how many times do you talk on the phone with family, friends, or neighbors? More than 3   How often do you get together with friends or relatives? Three times   How often do you attend Episcopal or Yarsani services? Never   Do you belong to any clubs or organizations such as Episcopal groups, unions, fraternal or athletic groups, or school groups? No   How often do you attend meetings of the clubs or organizations you belong to? Never   Are you ,  , , , never , or living with a partner?    Intimate Partner Violence   Within the last year, have you been afraid of your partner or ex-partner? No   Within the last year, have you been humiliated or emotionally abused in other ways by your partner or ex-partner? No   Within the last year, have you been kicked, hit, slapped, or otherwise physically hurt by your partner or ex-partner? No   Within the last year, have you been raped or forced to have any kind of sexual activity by your partner or ex-partner? No   Alcohol Use   Q1: How often do you have a drink containing alcohol? Never   Q2: How many drinks containing alcohol do you have on a typical day when you are drinking? None   Q3: How often do you have six or more drinks on one occasion? Never   Utilities   In the past 12 months has the electric, gas, oil, or water company threatened to shut off services in your home? No   Health Literacy   How often do you need to have someone help you when you read instructions, pamphlets, or other written material from your doctor or pharmacy? Rarely         Yessi Christina RN

## 2024-08-17 NOTE — ED NOTES
Pt was laying on right side with BP cuff on Left arm. Pt repositioned and BP stable     Sarah Trevino RN  08/17/24 1665

## 2024-08-17 NOTE — PROGRESS NOTES
"Annetta Miguel is a 89 y.o. female on day 0 of admission presenting with Diarrhea.       08/17/24 1548   Current Planned Discharge Disposition   Current Planned Discharge Disposition Home H     Patient is a resident at [facility]. She does not use assistive devices although her daughter Taina states she could use a walker as she has been \"shaky\". Also states patient was dc from [facility] <one week ago with a cane d/t nerve issue in her neck. She is independent with ADLs, daily tasks, drives and has her car on site at facility. She will schedule with [facility] transportation as a back up. PCP is Dr Renae Bhakta.   ADOD 08/22/2024  Plan is to return to [facility]. They are interested in [facility] but do not want to commit at this time. AOC is [facility].     Yessi Christina RN      "

## 2024-08-17 NOTE — PROGRESS NOTES
Attestation/Supervisory note for LINDA Lundberg      The patient is a 89-year-old female presenting to the emergency department from her independent living facility by EMS for evaluation of generalized malaise and fatigue with diarrhea.  The patient states that she has had symptoms for the past several days.  She states that she is having 2-3 loose stools each day for the past several days.  She states that she has not sought any medical care for her symptoms but has had some increasing malaise fatigue and generalized weakness with her symptoms.  She states that she does have a history of chronic kidney disease but is not on dialysis.  She denies any use of antibiotics.  She denies any recent travel.  No sick contacts.  No fever or chills.  She states that today when she was trying to go to the restroom she just felt very weak and did stumble and fall to the ground.  She states that she did not injure herself when she fell.  She did not hit her head or lose consciousness.  She does not have any headache or visual changes.  No neck or back pain.  No chest pain or shortness of breath.  No abdominal pain.  No nausea or vomiting.  No urinary complaints.  No vaginal discharge.  No focal weakness or numbness.  All pertinent positives and negatives are recorded above.  All other systems reviewed and otherwise negative.  Vital signs within normal limits.  Physical exam with a well-nourished well-developed female in no acute distress.  HEENT exam with dry mucous membranes but otherwise unremarkable.  She has no evidence of airway compromise or respiratory distress.  Abdominal exam is benign.  She does not have any gross motor, neurologic or vascular deficits on exam.  NIH stroke scale score of 0.  She does have bilateral lower extremity pitting edema.      EKG with normal sinus rhythm at 91 bpm, right bundle branch block pattern, normal axis, normal voltage, normal ST segment, and normal T waves      IV fluids  ordered.      Diagnostic labs with evidence of urinary tract infection, elevated troponin T, leukocytosis, anemia, thrombocytopenia, electrolyte imbalance with hypomagnesemia and hyponatremia, chronic renal failure, but otherwise unremarkable.      Lactic acid 1.0      Initial troponin T 176.  Repeat trop t 157      CT abdomen pelvis wo IV contrast   Final Result   Sigmoid diverticulosis without definite diverticulitis.        Multiple fluid-filled and slightly dilated loops of jejunum.   Nondilated loops of ileum. Findings may be due to focal ileus or   partial small bowel obstruction. Follow-up as clinically warranted.        Intraluminal fat density in a loop of bowel in the right aspect of   the abdomen likely within the ascending colon may represent focal   intussusception of unknown etiology or significance. Short-term   follow-up CT to further evaluate or following oral contrast as   warranted. No evidence of associated obstruction.        Numerous hypodensities within the liver and both kidneys may   represent combination of simple and complex cysts but not fully   characterized without IV contrast. Follow-up nonemergent triple phase   CT or MRI of the liver and kidneys as clinically warranted.        Compression fracture deformity of L1 vertebral body of indeterminate   age.        Right basilar infiltrate and effusion. Follow-up is recommended        MACRO:   None        Signed by: Aldo Vidal 8/17/2024 1:57 PM   Dictation workstation:   [id number]      XR chest 1 view   Final Result   1.  No evidence of acute cardiopulmonary process.             Signed by: Aaron Biswas 8/17/2024 12:34 PM   Dictation workstation:   [id number]      Transthoracic Echo (TTE) Complete    (Results Pending)   CT head wo IV contrast    (Results Pending)             The patient does not have any evidence of STEMI on EKG.  She does have an elevated troponin T but also has chronic renal failure.  She denies having any chest pain  or shortness of breath at this time.  Chest x-ray without acute process.  No evidence of pneumonia or pneumothorax.  No evidence of CHF.  No evidence of widening of the mediastinum.  She does have equal pulses bilaterally.  Cultures were obtained and IV fluids and IV antibiotics were ordered for the sepsis bundle.  CT abdomen and pelvis showed evidence of sigmoid diverticulosis but no evidence of acute diverticulitis.  There is evidence of a possible partial small bowel obstruction and an intraluminal fat density and a loop of bowel in the right aspect of the abdomen which may represent a focal intussusception per the radiology reading.  Stool cultures were ordered and the patient was admitted for further management.  CT head was ordered but the study was pending at the time of admission.      Impression/diagnosis:  1.  Malaise and fatigue  2.  Diarrhea, acute  3.  Fall from standing height, initial encounter  4. SIRS  5.  Acute lower urinary tract infection  6.  Electrolyte imbalance  7.   Chronic renal failure      Critical care time of  33 minutes billed for assessment of the patient with NIH stroke scale, monitoring the patient on telemetry, initiation of IV magnesium and calcium for treatment of her electrolyte imbalance, initiation of the sepsis bundle, consultation with accepting provider and arrangement for admission..  This time excludes time for billable procedures.      critical care time billed for by me is non concurrent with time billed for by LINDA Lundberg      I personally saw the patient and made/approve the management plan and take responsibility for the patient management.      I independently interpreted the following study (S) EKG and diagnostic labs      I personally discussed the patient's management with the patient      I reviewed the results of the diagnostic labs and diagnostic imaging.  Formal radiology read was completed by the radiologist.      Sybil Shi MD

## 2024-08-17 NOTE — PROGRESS NOTES
Annetta Miguel is a 89 y.o. female on day 0 of admission presenting with Diarrhea.       08/17/24 1547   ACS Disability Status   Are you deaf or do you have serious difficulty hearing? N   Are you blind or do you have serious difficulty seeing, even when wearing glasses? N   Because of a physical, mental, or emotional condition, do you have serious difficulty concentrating, remembering, or making decisions? (5 years old or older) N   Do you have serious difficulty walking or climbing stairs? N   Do you have serious difficulty dressing or bathing? N   Because of a physical, mental, or emotional condition, do you have serious difficulty doing errands alone such as visiting the doctor? N         Yessi Christina RN

## 2024-08-17 NOTE — ED TRIAGE NOTES
Pt states that she has been having diarrhea for a few days. Pt states she was trying to clean up and tripped and fell. Pt denies any thinner, LOC or hitting her head. Pt denying pain at this time. Pt states she called for help bc she could not get up on her own after the fall

## 2024-08-17 NOTE — ED PROVIDER NOTES
HPI   Chief Complaint   Patient presents with    Fall       Is a 89-year-old female presenting to the emergency department complaints of fall.  Patient states that this morning she was trying to clean up after she had diarrhea and tripped.  She did fall to the ground but she did not injure herself.  She did not hit her head.  There is no loss of conscious.  She did call security as she lives at a independent living facility.  She did not get up after the fall.  Patient states she has been having diarrhea over the last 2 to 3 days.  No recent travel, no recent hospitalization, no previous antibiotic use.  Patient denies any abdominal pain, nausea, vomiting or urinary complaints.  She does see Dr. Littlejohn for chronic kidney disease.  No chest pain, shortness of breath, headache.      Please see HPI for pertinent positive and negative ROS.         Patient History   Past Medical History:   Diagnosis Date    Cervical stenosis of spine     CKD (chronic kidney disease)     HLD (hyperlipidemia)     Hypertension     Insomnia     Osteoporosis      History reviewed. No pertinent surgical history.  Family History   Problem Relation Name Age of Onset    Stroke Mother      Other (Heart Condition) Father      Diabetes Father      Heart disease Father      Other (Intestinal Complications) Sister      Parkinsonism Brother       Social History     Tobacco Use    Smoking status: Never     Passive exposure: Never    Smokeless tobacco: Never   Vaping Use    Vaping status: Never Used   Substance Use Topics    Alcohol use: Not Currently    Drug use: Never       Physical Exam   ED Triage Vitals [08/17/24 0935]   Temperature Heart Rate Respirations BP   37.1 °C (98.8 °F) 97 16 102/56      Pulse Ox Temp Source Heart Rate Source Patient Position   97 % Oral -- --      BP Location FiO2 (%)     -- --       Physical Exam  GENERAL APPEARANCE: Awake and alert. No acute respiratory distress.   VITAL SIGNS: As per the nurses' triage record.  HEENT:  Normocephalic, atraumatic. Extraocular muscles are intact. Conjunctiva are pink. Negative scleral icterus. Mucous membranes are dry.   NECK: Soft, nontender and supple, full gross ROM, no meningeal signs.  CHEST: Nontender to palpation. Clear to auscultation bilaterally. No rales, rhonchi, or wheezing. Symmetric rise and fall of chest wall.   HEART: Clear S1 and S2. Regular rate and rhythm.  Strong and equal pulses in the extremities.  ABDOMEN: Soft, nontender, nondistended  MUSCULOSKELETAL: The calves are nontender to palpation. Full gross active range of motion. Ambulating on own with no acute difficulties  NEUROLOGICAL: Awake, alert and oriented x 3. Motor power intact in the upper and lower extremities. Sensation is intact to light touch in the upper and lower extremities. Patient answering questions appropriately.  Cranial nerves II through XII grossly intact bilaterally.  IMMUNOLOGICAL: No lymphatic streaking noted  DERMATOLOGIC: Warm and dry without petechiae, rashes, or ecchymosis noted on visible skin.   PYSCH: Cooperative with appropriate mood and affect.    ED Course & MDM   ED Course as of 08/17/24 1706   Sat Aug 17, 2024   1122 Corrected calcium with hypoalbuminemia 6.6. [SC]      ED Course User Index  [SC] Miley Lundberg PA-C         Diagnoses as of 08/17/24 1706   Diarrhea   JET (acute kidney injury) (CMS-[facility])   Hypomagnesemia   Hypocalcemia   Acute UTI (urinary tract infection)                 No data recorded     [place] Coma Scale Score: 15 (08/17/24 1618 : Steff Yi RN)                           Medical Decision Making  Parts of this chart have been completed using voice recognition software. Please excuse any errors of transcription.  My thought process and reason for plan has been formulated from the time that I saw the patient until the time of disposition and is not specific to one specific moment during their visit and furthermore my MDM encompasses this entire chart and not  only this text box.      HPI: Detailed above.    Exam: A medically appropriate exam performed, outlined above, given the known history and presentation.    History obtained from: Patient    Medications given during visit:  Medications   atorvastatin (Lipitor) tablet 10 mg (has no administration in time range)   sevelamer carbonate (Renvela) tablet 800 mg (has no administration in time range)   sodium bicarbonate tablet 650 mg (has no administration in time range)   cefTRIAXone (Rocephin) 1 g in dextrose (iso) IV 50 mL (has no administration in time range)   pantoprazole (ProtoNix) EC tablet 40 mg (has no administration in time range)     Or   pantoprazole (ProtoNix) injection 40 mg (has no administration in time range)   calcitriol (Rocaltrol) capsule 0.25 mcg (has no administration in time range)   calcium gluconate 1 g in sodium chloride (iso) IV 50 mL (has no administration in time range)   sodium chloride 0.9 % with KCl 20 mEq/L infusion (has no administration in time range)   sodium chloride 0.9 % bolus 500 mL (0 mL intravenous Stopped 8/17/24 1101)   cefTRIAXone (Rocephin) 1 g in dextrose (iso) IV 50 mL (0 g intravenous Stopped 8/17/24 1127)   sodium chloride 0.9 % bolus 500 mL (0 mL intravenous Stopped 8/17/24 1127)   magnesium sulfate 1 g in dextrose 5%  mL (0 g intravenous Stopped 8/17/24 1256)   calcium gluconate 1 g in sodium chloride (iso) IV 50 mL (0 g intravenous Stopped 8/17/24 1620)   hydrOXYzine HCL (Atarax) tablet 25 mg (25 mg oral Given 8/17/24 1247)        Diagnostic/tests  Labs Reviewed   CBC WITH AUTO DIFFERENTIAL - Abnormal       Result Value    WBC 23.1 (*)     nRBC 0.0      RBC 2.46 (*)     Hemoglobin 7.9 (*)     Hematocrit 22.7 (*)     MCV 92      MCH 32.1      MCHC 34.8      RDW 12.8      Platelets 130 (*)     Immature Granulocytes %, Automated 2.8 (*)     Immature Granulocytes Absolute, Automated 0.65 (*)    COMPREHENSIVE METABOLIC PANEL - Abnormal    Glucose 105 (*)     Sodium 126  (*)     Potassium 3.5      Chloride 93 (*)     Bicarbonate 14 (*)     Urea Nitrogen 65 (*)     Creatinine 4.40 (*)     eGFR 9 (*)     Calcium 5.8 (*)     Albumin 3.0 (*)     Alkaline Phosphatase 65      Total Protein 5.8 (*)     AST 27      Bilirubin, Total 0.9      ALT 15      Anion Gap 19     URINALYSIS WITH REFLEX CULTURE AND MICROSCOPIC - Abnormal    Color, Urine Yellow      Appearance, Urine Turbid (*)     Specific Gravity, Urine 1.012      pH, Urine 6.0      Protein, Urine 300 (3+) (*)     Glucose, Urine Normal      Blood, Urine 1.0 (3+) (*)     Ketones, Urine NEGATIVE      Bilirubin, Urine NEGATIVE      Urobilinogen, Urine Normal      Nitrite, Urine NEGATIVE      Leukocyte Esterase, Urine 500 Jessica/µL (*)    SERIAL TROPONIN, INITIAL ([facility]) - Abnormal    Troponin T, High Sensitivity 176 (*)    SERIAL TROPONIN,  2 HOUR ([facility]) - Abnormal    Troponin T, High Sensitivity 157 (*)    MICROSCOPIC ONLY, URINE - Abnormal    WBC, Urine >50 (*)     WBC Clumps, Urine FEW      RBC, Urine 6-10 (*)     Bacteria, Urine 2+ (*)    MAGNESIUM - Abnormal    Magnesium 1.10 (*)    SERIAL TROPONIN, 6 HOUR ([facility]) - Abnormal    Troponin T, High Sensitivity 135 (*)    COMPREHENSIVE METABOLIC PANEL - Abnormal    Glucose 146 (*)     Sodium 127 (*)     Potassium 3.0 (*)     Chloride 95 (*)     Bicarbonate 13 (*)     Urea Nitrogen 62 (*)     Creatinine 3.70 (*)     eGFR 11 (*)     Calcium 5.5 (*)     Albumin 2.6 (*)     Alkaline Phosphatase 51      Total Protein 5.0 (*)     AST 25      Bilirubin, Total 0.6      ALT 12      Anion Gap 19     CBC WITH AUTO DIFFERENTIAL - Abnormal    WBC 20.1 (*)     nRBC 0.0      RBC 2.43 (*)     Hemoglobin 7.7 (*)     Hematocrit 22.5 (*)     MCV 93      MCH 31.7      MCHC 34.2      RDW 12.7      Platelets 115 (*)     Immature Granulocytes %, Automated 1.6 (*)     Immature Granulocytes Absolute, Automated 0.32     HEMOGLOBIN A1C - Abnormal    Hemoglobin A1C 5.7 (*)     Estimated Average Glucose 117       Narrative:     Diagnosis of Diabetes-Adults  Non-Diabetic: < or = 5.6%  Increased risk for developing diabetes: 5.7-6.4%  Diagnostic of diabetes: > or = 6.5%       CALCIUM, IONIZED - Abnormal    POCT Calcium, Ionized 0.72 (*)    MANUAL DIFFERENTIAL - Abnormal    Neutrophils %, Manual 83.0      Bands %, Manual 10.0      Lymphocytes %, Manual 4.0      Monocytes %, Manual 2.0      Eosinophils %, Manual 0.0      Basophils %, Manual 0.0      Metamyelocytes %, Manual 1.0      Seg Neutrophils Absolute, Manual 19.17 (*)     Bands Absolute, Manual 2.31 (*)     Lymphocytes Absolute, Manual 0.92      Monocytes Absolute, Manual 0.46      Eosinophils Absolute, Manual 0.00      Basophils Absolute, Manual 0.00      Metamyelocytes Absolute, Manual 0.23      Total Cells Counted 100      Neutrophils Absolute, Manual 21.48 (*)     RBC Morphology See Below      Ovalocytes Few      Hamlin Cells Many      Dohle Bodies Present      Vacuolated Neutrophils Present      Clumped Platelets Present     MANUAL DIFFERENTIAL - Abnormal    Neutrophils %, Manual 80.0      Bands %, Manual 13.0      Lymphocytes %, Manual 3.0      Monocytes %, Manual 2.0      Eosinophils %, Manual 0.0      Basophils %, Manual 0.0      Metamyelocytes %, Manual 2.0      Seg Neutrophils Absolute, Manual 16.08 (*)     Bands Absolute, Manual 2.61 (*)     Lymphocytes Absolute, Manual 0.60 (*)     Monocytes Absolute, Manual 0.40      Eosinophils Absolute, Manual 0.00      Basophils Absolute, Manual 0.00      Metamyelocytes Absolute, Manual 0.40      Total Cells Counted 100      Neutrophils Absolute, Manual 18.69 (*)     RBC Morphology See Below      Ovalocytes Few      John Cells Many      Dohle Bodies Present      Vacuolated Neutrophils Present     SARS-COV-2 PCR - Normal    Coronavirus 2019, PCR Not Detected      Narrative:     This assay has received FDA Emergency Use Authorization (EUA) and is only authorized for the duration of time that circumstances exist to justify the  authorization of the emergency use of in vitro diagnostic tests for the detection of SARS-CoV-2 virus and/or diagnosis of COVID-19 infection under section 564(b)(1) of the Act, 21 U.S.C. 360bbb-3(b)(1). This assay is an in vitro diagnostic nucleic acid amplification test for the qualitative detection of SARS-CoV-2 from nasopharyngeal specimens and has been validated for use at [facility]. Negative results do not preclude COVID-19 infections and should not be used as the sole basis for diagnosis, treatment, or other management decisions.     BLOOD GAS LACTIC ACID, VENOUS - Normal    POCT Lactate, Venous 1.0     MAGNESIUM - Normal    Magnesium 3.10     STOOL PATHOGEN PANEL, PCR   URINE CULTURE   C. DIFFICILE, PCR   BLOOD CULTURE   BLOOD CULTURE   TROPONIN T SERIES, HIGH SENSITIVITY (0, 2 HR, 6 HR)    Narrative:     The following orders were created for panel order Troponin T Series, High Sensitivity (0, 2HR, 6HR).  Procedure                               Abnormality         Status                     ---------                               -----------         ------                     Serial Troponin, Initial...[[id number]]  Abnormal            Final result               Serial Troponin, 2 Hour ...[[id number]]  Abnormal            Final result               Serial Troponin, 6 Hour ...[[id number]]  Abnormal            Final result                 Please view results for these tests on the individual orders.   URINALYSIS WITH REFLEX CULTURE AND MICROSCOPIC    Narrative:     The following orders were created for panel order Urinalysis with Reflex Culture and Microscopic.  Procedure                               Abnormality         Status                     ---------                               -----------         ------                     Urinalysis with Reflex C...[[id number]]  Abnormal            Final result               Extra Urine Gray Tube[[id number]]                            In process                    Please view results for these tests on the individual orders.   EXTRA URINE GRAY TUBE   N-TERMINAL PROBNP   IRON AND TIBC   FERRITIN   FOLATE      CT abdomen pelvis wo IV contrast   Final Result   Sigmoid diverticulosis without definite diverticulitis.        Multiple fluid-filled and slightly dilated loops of jejunum.   Nondilated loops of ileum. Findings may be due to focal ileus or   partial small bowel obstruction. Follow-up as clinically warranted.        Intraluminal fat density in a loop of bowel in the right aspect of   the abdomen likely within the ascending colon may represent focal   intussusception of unknown etiology or significance. Short-term   follow-up CT to further evaluate or following oral contrast as   warranted. No evidence of associated obstruction.        Numerous hypodensities within the liver and both kidneys may   represent combination of simple and complex cysts but not fully   characterized without IV contrast. Follow-up nonemergent triple phase   CT or MRI of the liver and kidneys as clinically warranted.        Compression fracture deformity of L1 vertebral body of indeterminate   age.        Right basilar infiltrate and effusion. Follow-up is recommended        MACRO:   None        Signed by: Aldo Vidal 8/17/2024 1:57 PM   Dictation workstation:   [id number]      XR chest 1 view   Final Result   1.  No evidence of acute cardiopulmonary process.             Signed by: Aaron Biswas 8/17/2024 12:34 PM   Dictation workstation:   [id number]      CT head wo IV contrast    (Results Pending)        Considerations/further MDM:  Patient was seen in conjucntion with my supervising physician,  Dr. Shi. Please refer to her note.    Patient presents with blood pressure 102/56, temperature 98.8 °F, heart 97 bpm, respirations 16, 97% room air    Differential diagnosis includes was not limited to viral gastroenteritis versus infectious diarrhea versus electrolyte disturbance  versus JET versus dehydration    Laboratory evaluation does reveal a calcium of 5.1 and magnesium of 1.1.  Urinalysis does show evidence of urinary tract infection.  Sepsis protocol was initiated including IV fluids 30 mL/kg IV fluid bolus, IV ceftriaxone for UTI, blood cultures and lactic acid.  Initial lactic acid 1.  Leukocytosis with a white blood cell count of 20.1.  CT of the abdomen pelvis without IV contrast shows many nonspecific findings such as ileus, possible small bowel obstruction, numerous hypodensities in the liver.  Patient does not have any tenderness to palpation on abdominal exam.  Therefore I do have low suspicion for bowel obstruction.  She is having diarrhea as well.  No nausea or vomiting.  Patient was treated with IV magnesium and IV calcium.  She was admitted for evidence of JET, electrolyte disturbances, possible infectious diarrhea.  Stool panel and C. difficile panel pending at time of admission.    The patient was evaluated in the emergency department and an etiology requiring emergent treatment or admission to hospital was identified.  The patient/family was counseled on clinical expression, expectations, and plan along with recommendations for admission.  All questions were answered and involved parties were understanding and agreeable to course of treatment.  Case was discussed with admitting physician, Dr. Blake.  Bed type ED treatment and further ED work-up decided by joint decision making with admitting team and any consultants.  Patient stable for admission per my assessment and further management of patient will be deferred to the inpatient setting.    Critical care time was billed at 31  minutes by me and is nonconcurrent with other providers involved.  Time excludes other separately billable procedures.  Critical care time was billed due to patient having significant hypocalcemia and hypomagnesia with sepsis.      Procedure  Procedures     Miley Lundberg PA-C  08/17/24  [id number]

## 2024-08-18 ENCOUNTER — APPOINTMENT (OUTPATIENT)
Dept: RADIOLOGY | Facility: HOSPITAL | Age: 89
DRG: 872 | End: 2024-08-18
Payer: MEDICARE

## 2024-08-18 VITALS
BODY MASS INDEX: 26.37 KG/M2 | TEMPERATURE: 98.8 F | HEIGHT: 62 IN | WEIGHT: 143.3 LBS | OXYGEN SATURATION: 98 % | HEART RATE: 81 BPM | RESPIRATION RATE: 18 BRPM | SYSTOLIC BLOOD PRESSURE: 107 MMHG | DIASTOLIC BLOOD PRESSURE: 54 MMHG

## 2024-08-18 LAB
25(OH)D3 SERPL-MCNC: 14 NG/ML (ref 31–100)
ALBUMIN SERPL-MCNC: 2.7 G/DL (ref 3.5–5)
ALP BLD-CCNC: 53 U/L (ref 35–125)
ALT SERPL-CCNC: 14 U/L (ref 5–40)
ANION GAP SERPL CALC-SCNC: 17 MMOL/L
AST SERPL-CCNC: 27 U/L (ref 5–40)
BACTERIA BLD CULT: NORMAL
BACTERIA BLD CULT: NORMAL
BASOPHILS # BLD AUTO: 0.02 X10*3/UL (ref 0–0.1)
BASOPHILS NFR BLD AUTO: 0.1 %
BILIRUB SERPL-MCNC: 0.4 MG/DL (ref 0.1–1.2)
BUN SERPL-MCNC: 66 MG/DL (ref 8–25)
CALCIUM SERPL-MCNC: 6.4 MG/DL (ref 8.5–10.4)
CHLORIDE SERPL-SCNC: 98 MMOL/L (ref 97–107)
CK SERPL-CCNC: 348 U/L (ref 24–195)
CO2 SERPL-SCNC: 14 MMOL/L (ref 24–31)
CREAT SERPL-MCNC: 4.1 MG/DL (ref 0.4–1.6)
EGFRCR SERPLBLD CKD-EPI 2021: 10 ML/MIN/1.73M*2
EOSINOPHIL # BLD AUTO: 0.01 X10*3/UL (ref 0–0.4)
EOSINOPHIL NFR BLD AUTO: 0.1 %
ERYTHROCYTE [DISTWIDTH] IN BLOOD BY AUTOMATED COUNT: 12.8 % (ref 11.5–14.5)
GLUCOSE BLD MANUAL STRIP-MCNC: 101 MG/DL (ref 74–99)
GLUCOSE BLD MANUAL STRIP-MCNC: 140 MG/DL (ref 74–99)
GLUCOSE SERPL-MCNC: 74 MG/DL (ref 65–99)
HCT VFR BLD AUTO: 23.7 % (ref 36–46)
HGB BLD-MCNC: 8.1 G/DL (ref 12–16)
IMM GRANULOCYTES # BLD AUTO: 0.15 X10*3/UL (ref 0–0.5)
IMM GRANULOCYTES NFR BLD AUTO: 0.9 % (ref 0–0.9)
LYMPHOCYTES # BLD AUTO: 0.58 X10*3/UL (ref 0.8–3)
LYMPHOCYTES NFR BLD AUTO: 3.5 %
MCH RBC QN AUTO: 31.6 PG (ref 26–34)
MCHC RBC AUTO-ENTMCNC: 34.2 G/DL (ref 32–36)
MCV RBC AUTO: 93 FL (ref 80–100)
MONOCYTES # BLD AUTO: 0.56 X10*3/UL (ref 0.05–0.8)
MONOCYTES NFR BLD AUTO: 3.4 %
NEUTROPHILS # BLD AUTO: 15.1 X10*3/UL (ref 1.6–5.5)
NEUTROPHILS NFR BLD AUTO: 92 %
NRBC BLD-RTO: 0 /100 WBCS (ref 0–0)
PLATELET # BLD AUTO: 126 X10*3/UL (ref 150–450)
POTASSIUM SERPL-SCNC: 4 MMOL/L (ref 3.4–5.1)
PROT SERPL-MCNC: 5.1 G/DL (ref 5.9–7.9)
RBC # BLD AUTO: 2.56 X10*6/UL (ref 4–5.2)
SODIUM SERPL-SCNC: 129 MMOL/L (ref 133–145)
WBC # BLD AUTO: 16.4 X10*3/UL (ref 4.4–11.3)

## 2024-08-18 PROCEDURE — 82306 VITAMIN D 25 HYDROXY: CPT | Performed by: INTERNAL MEDICINE

## 2024-08-18 PROCEDURE — 83970 ASSAY OF PARATHORMONE: CPT | Mod: WESLAB

## 2024-08-18 PROCEDURE — 82550 ASSAY OF CK (CPK): CPT

## 2024-08-18 PROCEDURE — 84075 ASSAY ALKALINE PHOSPHATASE: CPT

## 2024-08-18 PROCEDURE — 97161 PT EVAL LOW COMPLEX 20 MIN: CPT | Mod: GP

## 2024-08-18 PROCEDURE — 36415 COLL VENOUS BLD VENIPUNCTURE: CPT | Performed by: INTERNAL MEDICINE

## 2024-08-18 PROCEDURE — 2500000002 HC RX 250 W HCPCS SELF ADMINISTERED DRUGS (ALT 637 FOR MEDICARE OP, ALT 636 FOR OP/ED)

## 2024-08-18 PROCEDURE — 36415 COLL VENOUS BLD VENIPUNCTURE: CPT

## 2024-08-18 PROCEDURE — 2060000001 HC INTERMEDIATE ICU ROOM DAILY

## 2024-08-18 PROCEDURE — 2500000001 HC RX 250 WO HCPCS SELF ADMINISTERED DRUGS (ALT 637 FOR MEDICARE OP)

## 2024-08-18 PROCEDURE — 99222 1ST HOSP IP/OBS MODERATE 55: CPT

## 2024-08-18 PROCEDURE — 73564 X-RAY EXAM KNEE 4 OR MORE: CPT | Mod: RIGHT SIDE | Performed by: RADIOLOGY

## 2024-08-18 PROCEDURE — 99222 1ST HOSP IP/OBS MODERATE 55: CPT | Performed by: STUDENT IN AN ORGANIZED HEALTH CARE EDUCATION/TRAINING PROGRAM

## 2024-08-18 PROCEDURE — 73564 X-RAY EXAM KNEE 4 OR MORE: CPT | Mod: RT

## 2024-08-18 PROCEDURE — 2500000004 HC RX 250 GENERAL PHARMACY W/ HCPCS (ALT 636 FOR OP/ED)

## 2024-08-18 PROCEDURE — 82947 ASSAY GLUCOSE BLOOD QUANT: CPT

## 2024-08-18 PROCEDURE — 2500000004 HC RX 250 GENERAL PHARMACY W/ HCPCS (ALT 636 FOR OP/ED): Performed by: INTERNAL MEDICINE

## 2024-08-18 PROCEDURE — 85025 COMPLETE CBC W/AUTO DIFF WBC: CPT

## 2024-08-18 RX ORDER — HEPARIN SODIUM 5000 [USP'U]/ML
5000 INJECTION, SOLUTION INTRAVENOUS; SUBCUTANEOUS EVERY 12 HOURS SCHEDULED
Status: DISCONTINUED | OUTPATIENT
Start: 2024-08-18 | End: 2024-08-20 | Stop reason: HOSPADM

## 2024-08-18 RX ORDER — ACETAMINOPHEN 500 MG
5 TABLET ORAL NIGHTLY
Status: DISCONTINUED | OUTPATIENT
Start: 2024-08-18 | End: 2024-08-20 | Stop reason: HOSPADM

## 2024-08-18 RX ADMIN — FERROUS SULFATE TAB 325 MG (65 MG ELEMENTAL FE) 1 TABLET: 325 (65 FE) TAB at 08:41

## 2024-08-18 RX ADMIN — SODIUM BICARBONATE 650 MG TABLET 650 MG: at 06:07

## 2024-08-18 RX ADMIN — ATORVASTATIN CALCIUM 10 MG: 10 TABLET, FILM COATED ORAL at 21:48

## 2024-08-18 RX ADMIN — SEVELAMER CARBONATE 800 MG: 800 TABLET, FILM COATED ORAL at 08:41

## 2024-08-18 RX ADMIN — PANTOPRAZOLE SODIUM 40 MG: 40 TABLET, DELAYED RELEASE ORAL at 06:07

## 2024-08-18 RX ADMIN — HEPARIN SODIUM 5000 UNITS: 5000 INJECTION, SOLUTION INTRAVENOUS; SUBCUTANEOUS at 21:48

## 2024-08-18 RX ADMIN — CEFTRIAXONE SODIUM 1 G: 1 INJECTION, SOLUTION INTRAVENOUS at 08:41

## 2024-08-18 RX ADMIN — CALCIUM GLUCONATE 1 G: 20 INJECTION, SOLUTION INTRAVENOUS at 02:13

## 2024-08-18 RX ADMIN — SEVELAMER CARBONATE 800 MG: 800 TABLET, FILM COATED ORAL at 16:43

## 2024-08-18 RX ADMIN — SODIUM BICARBONATE 75 ML/HR: 84 INJECTION, SOLUTION INTRAVENOUS at 09:20

## 2024-08-18 RX ADMIN — CALCITRIOL CAPSULES 0.25 MCG 0.25 MCG: 0.25 CAPSULE ORAL at 08:41

## 2024-08-18 RX ADMIN — Medication 5 MG: at 21:48

## 2024-08-18 RX ADMIN — SODIUM CHLORIDE AND POTASSIUM CHLORIDE 100 ML/HR: .9; .15 SOLUTION INTRAVENOUS at 06:40

## 2024-08-18 RX ADMIN — SEVELAMER CARBONATE 800 MG: 800 TABLET, FILM COATED ORAL at 11:57

## 2024-08-18 RX ADMIN — HEPARIN SODIUM 5000 UNITS: 5000 INJECTION, SOLUTION INTRAVENOUS; SUBCUTANEOUS at 08:41

## 2024-08-18 ASSESSMENT — COGNITIVE AND FUNCTIONAL STATUS - GENERAL
EATING MEALS: A LITTLE
DAILY ACTIVITIY SCORE: 18
STANDING UP FROM CHAIR USING ARMS: A LITTLE
DRESSING REGULAR UPPER BODY CLOTHING: A LITTLE
STANDING UP FROM CHAIR USING ARMS: A LITTLE
MOVING TO AND FROM BED TO CHAIR: A LITTLE
HELP NEEDED FOR BATHING: A LITTLE
TURNING FROM BACK TO SIDE WHILE IN FLAT BAD: A LITTLE
WALKING IN HOSPITAL ROOM: A LITTLE
PERSONAL GROOMING: A LITTLE
CLIMB 3 TO 5 STEPS WITH RAILING: A LITTLE
MOVING TO AND FROM BED TO CHAIR: A LITTLE
MOBILITY SCORE: 18
DRESSING REGULAR LOWER BODY CLOTHING: A LITTLE
PERSONAL GROOMING: A LITTLE
MOBILITY SCORE: 18
TOILETING: A LITTLE
WALKING IN HOSPITAL ROOM: A LITTLE
HELP NEEDED FOR BATHING: A LITTLE
MOVING TO AND FROM BED TO CHAIR: A LITTLE
STANDING UP FROM CHAIR USING ARMS: A LITTLE
MOVING FROM LYING ON BACK TO SITTING ON SIDE OF FLAT BED WITH BEDRAILS: A LITTLE
DRESSING REGULAR LOWER BODY CLOTHING: A LITTLE
MOVING FROM LYING ON BACK TO SITTING ON SIDE OF FLAT BED WITH BEDRAILS: A LITTLE
MOVING FROM LYING ON BACK TO SITTING ON SIDE OF FLAT BED WITH BEDRAILS: A LITTLE
TOILETING: A LITTLE
CLIMB 3 TO 5 STEPS WITH RAILING: A LITTLE
DAILY ACTIVITIY SCORE: 18
TURNING FROM BACK TO SIDE WHILE IN FLAT BAD: A LITTLE
TURNING FROM BACK TO SIDE WHILE IN FLAT BAD: A LITTLE
CLIMB 3 TO 5 STEPS WITH RAILING: A LITTLE
EATING MEALS: A LITTLE
DRESSING REGULAR UPPER BODY CLOTHING: A LITTLE
MOBILITY SCORE: 18
WALKING IN HOSPITAL ROOM: A LITTLE

## 2024-08-18 ASSESSMENT — ENCOUNTER SYMPTOMS
HEADACHES: 0
SHORTNESS OF BREATH: 0
NECK PAIN: 0
RECTAL PAIN: 0
TROUBLE SWALLOWING: 0
WEAKNESS: 1
HALLUCINATIONS: 0
VOICE CHANGE: 0
LIGHT-HEADEDNESS: 0
ABDOMINAL PAIN: 0
CHEST TIGHTNESS: 0
CONFUSION: 0
DIZZINESS: 0
UNEXPECTED WEIGHT CHANGE: 0
SYNCOPE: 0
COLOR CHANGE: 0
APNEA: 0
SPEECH DIFFICULTY: 0
SINUS PRESSURE: 0
FACIAL ASYMMETRY: 0
POLYPHAGIA: 0
MYALGIAS: 0
VOMITING: 0
JOINT SWELLING: 0
NUMBNESS: 0
ARTHRALGIAS: 0
ADENOPATHY: 0
APPETITE CHANGE: 1
SEIZURES: 0
ACTIVITY CHANGE: 1
NAUSEA: 1
BACK PAIN: 0
TREMORS: 0
DIARRHEA: 1
FEVER: 0
COUGH: 0
CONSTIPATION: 0
WHEEZING: 0
UNEXPECTED WEIGHT CHANGE: 1
NAUSEA: 0
FATIGUE: 1
HEMATURIA: 0
POLYDIPSIA: 0
DYSURIA: 0
PALPITATIONS: 0
PHOTOPHOBIA: 0
SORE THROAT: 0
BRUISES/BLEEDS EASILY: 0
SLEEP DISTURBANCE: 0
BLOOD IN STOOL: 0
DYSPNEA ON EXERTION: 0
FREQUENCY: 0
WOUND: 0
CHILLS: 0

## 2024-08-18 ASSESSMENT — PAIN SCALES - GENERAL
PAINLEVEL_OUTOF10: 0 - NO PAIN

## 2024-08-18 ASSESSMENT — PAIN - FUNCTIONAL ASSESSMENT
PAIN_FUNCTIONAL_ASSESSMENT: 0-10

## 2024-08-18 ASSESSMENT — ACTIVITIES OF DAILY LIVING (ADL)
ADL_ASSISTANCE: INDEPENDENT
ADLS_ADDRESSED: NO

## 2024-08-18 NOTE — PROGRESS NOTES
Annetta Miguel is a 89 y.o. female on day 1 of admission presenting with Diarrhea.      Subjective   Patient seen and examined.  Awake alert and oriented.  Appears in better spirits today.  Adult daughter, Taina, and son-in-law present bedside. Denies bowel movements overnight.  Denies nausea/vomiting/chills fever. Denies pain or shortness of breath.  No acute distress.       Objective     Last Recorded Vitals  /50 (BP Location: Right arm, Patient Position: Lying)   Pulse 95   Temp 36.3 °C (97.3 °F) (Temporal)   Resp 17   Wt 63.5 kg (140 lb)   SpO2 98%   Intake/Output last 3 Shifts:    Intake/Output Summary (Last 24 hours) at 8/18/2024 1025  Last data filed at 8/18/2024 0600  Gross per 24 hour   Intake 2299.5 ml   Output 600 ml   Net 1699.5 ml       Admission Weight  Weight: 63.5 kg (140 lb) (08/17/24 0935)    Daily Weight  08/17/24 : 63.5 kg (140 lb)    Image Results  CT head wo IV contrast  Narrative: Interpreted By:  Johnathan Hamilton,   STUDY:  CT HEAD WO IV CONTRAST;  8/17/2024 10:53 pm      INDICATION:  Signs/Symptoms:s/p unwitnessed fall at home.      COMPARISON:  None.      ACCESSION NUMBER(S):  [id number]      ORDERING CLINICIAN:  MIGUEL A SCOTT      TECHNIQUE:  Noncontrast axial CT images of head were obtained with coronal and  sagittal reconstructed images.      FINDINGS:  BRAIN PARENCHYMA: Generalized cerebral volume loss. No mass-effect,  midline shift or effacement of cerebral sulci. Patchy periventricular  and subcortical white matter hypodensities, nonspecific but often  seen in the setting of chronic microangiopathic change.      HEMORRHAGE: No acute intracranial hemorrhage.      VENTRICLES and EXTRA-AXIAL SPACES: The ventricles and sulci are  within normal limits for brain volume. No abnormal extra-axial fluid  collection.      ORBITS: The visualized orbits and globes are within normal limits.      EXTRACRANIAL SOFT TISSUES: Within normal limits.      PARANASAL SINUSES/MASTOIDS:  Visualized paranasal sinuses are well  aerated. Small right mastoid effusion.      CALVARIUM: No depressed skull fracture.          Impression: 1. No acute intracranial abnormality identified.  2. Moderate cerebral volume loss and nonspecific white matter  changes, likely reflecting sequela of chronic microangiopathic change.  3. Small right mastoid effusion, correlate for evidence of  mastoiditis.              MACRO:  None      Signed by: Johnathan Hamilton 8/17/2024 11:49 PM  Dictation workstation:   [id number]  CT abdomen pelvis wo IV contrast  Narrative: Interpreted By:  Aldo Vidal,   STUDY:  CT ABDOMEN PELVIS WO IV CONTRAST;  8/17/2024 12:22 pm      INDICATION:  Signs/Symptoms:diarrhea with leukocytosis.      COMPARISON:  None      ACCESSION NUMBER(S):  [id number]      ORDERING CLINICIAN:  JEAN MARIE SIMON      TECHNIQUE:  CT of the abdomen and pelvis was performed. Contiguous axial images  were obtained at 3 mm slice thickness through the abdomen and pelvis.  Coronal and sagittal reconstructions at 3 mm slice thickness were  performed.  No intravenous contrast was administered.      FINDINGS:  Please note that the evaluation of vessels, lymph nodes and organs is  limited without intravenous contrast.      LOWER CHEST:  The lung bases demonstrate hyperinflation. Right basilar pleural  thickening and posterior infiltrate. Punctate subpleural nodule in  right middle lobe may represent punctate scarring. Calcified  granulomas in the lingula.      ABDOMEN:      LIVER:  The liver is remarkable for multiple hypodensities which can not be  characterized without IV contrast. These may represent simple cysts.      BILE DUCTS:  No bile duct dilatation.      GALLBLADDER:  Prior cholecystectomy.      PANCREAS:  Unremarkable pancreas.      SPLEEN:  Punctate calcified splenic granulomas.      ADRENAL GLANDS:  No adrenal masses.          KIDNEYS AND URETERS:  Multiple renal hypodensities. These are too numerous to count.  They  may represent combination of simple and complex cysts but not fully  characterized without IV contrast.      PELVIS:      BLADDER:  Mostly obscured by the total right hip arthroplasty.      REPRODUCTIVE ORGANS:  No definite masses.      BOWEL:  Multiple fluid-filled dilated loops of jejunum. Few nondilated loops  of more distal ileum. Findings may due partial small bowel  obstruction. No definite focal inflammatory changes. Diverticulosis  without definite diverticulitis. Intraluminal fat density in a loop  of bowel in the right aspect of the abdomen likely within the  ascending colon may represent focal intussusception of unknown  etiology or significance. Short-term follow-up CT to further evaluate  or following oral contrast as warranted. No evidence of associated  obstruction.      VESSELS:  Aortic calcification and tortuosity.      PERITONEUM/RETROPERITONEUM/LYMPH NODES:  No retroperitoneal adenopathy. No ascites.      ABDOMINAL WALL:  Small fat containing umbilical hernia.              BONES:  Multilevel discogenic degenerative changes. Compression fracture  deformity of L1 vertebral body of indeterminate age.      Impression: Sigmoid diverticulosis without definite diverticulitis.      Multiple fluid-filled and slightly dilated loops of jejunum.  Nondilated loops of ileum. Findings may be due to focal ileus or  partial small bowel obstruction. Follow-up as clinically warranted.      Intraluminal fat density in a loop of bowel in the right aspect of  the abdomen likely within the ascending colon may represent focal  intussusception of unknown etiology or significance. Short-term  follow-up CT to further evaluate or following oral contrast as  warranted. No evidence of associated obstruction.      Numerous hypodensities within the liver and both kidneys may  represent combination of simple and complex cysts but not fully  characterized without IV contrast. Follow-up nonemergent triple phase  CT or MRI of  the liver and kidneys as clinically warranted.      Compression fracture deformity of L1 vertebral body of indeterminate  age.      Right basilar infiltrate and effusion. Follow-up is recommended      MACRO:  None      Signed by: Aldo Vidal 8/17/2024 1:57 PM  Dictation workstation:   [id number]  XR chest 1 view  Narrative: Interpreted By:  Aaron Biswas,   STUDY:  XR CHEST 1 VIEW;  8/17/2024 11:08 am      INDICATION:  Signs/Symptoms:malaise/fatigue.      COMPARISON:  Chest radiograph 08/09/2018      ACCESSION NUMBER(S):  [id number]      ORDERING CLINICIAN:  NJ SPENCE      FINDINGS:          CARDIOMEDIASTINAL SILHOUETTE:  Cardiomediastinal silhouette is stable in size and configuration.      LUNGS:  No consolidation, pneumothorax, or significant effusion.  Subcentimeter calcified nodule left lung base compatible with old  granulomatous disease.      ABDOMEN:  No remarkable upper abdominal findings.      BONES:  No acute osseous changes.      Impression: 1.  No evidence of acute cardiopulmonary process.          Signed by: Aaron Biswas 8/17/2024 12:34 PM  Dictation workstation:   [id number]      Physical Exam  Vitals and nursing note reviewed.   Constitutional:       Appearance: Normal appearance.   HENT:      Head: Normocephalic and atraumatic.      Mouth/Throat:      Mouth: Mucous membranes are moist.   Eyes:      Extraocular Movements: Extraocular movements intact.      Pupils: Pupils are equal, round, and reactive to light.   Cardiovascular:      Rate and Rhythm: Normal rate.      Pulses: Normal pulses.      Heart sounds: Normal heart sounds.   Pulmonary:      Effort: Pulmonary effort is normal.      Breath sounds: Normal breath sounds.   Abdominal:      General: Bowel sounds are normal.      Palpations: Abdomen is soft.   Musculoskeletal:         General: Normal range of motion.      Cervical back: Normal range of motion and neck supple.   Skin:     General: Skin is warm and dry.      Capillary Refill:  Capillary refill takes less than 2 seconds.   Neurological:      General: No focal deficit present.      Mental Status: She is alert and oriented to person, place, and time.   Psychiatric:         Mood and Affect: Mood normal.         Behavior: Behavior normal.         Relevant Results               Assessment/Plan      Assessment & Plan  Diarrhea      Gastroenteritis of yet unknown etiology  Abnormal CT abdomen/pelvis-- r/o SBO  No bowel movements reported overnight  Stool panel, C. Diff PCR has been ordered. R/o Isolation precautions  General surgery consulted SBO/ileus has been ruled out  Per general surgery okay to advance diet as tolerated    Acute dehydration, metabolic acidosis  Hyponatremia, hypomagnesia, hypocalcemia  Elevated CK  Replace electrolyte imbalances.  With concurrent CKD stage IV--reccs from nephrology regarding fluid/electrolyte   replacements appreciated  With low ionized calcium on admit--completed IV calcium gluconate x2  Continue home Calcitrol  Monitor on telemetry. Monitor I's and O's  Monitor BMP closely     Unwitnessed mechanical fall  Generalized weakness  Does not appear to be syncopal event  Patient states she does not fall frequently--her first fall this year  CT head negative for acute intracranial abnormalities  Neuro-checks  PT OT  Fall precautions    Acute urinary retention  Bladder scan >300ml.  Indwelling Wallace catheter inserted last night  Trial void before DC  Monitor I's and O's     JET on CKD IV not on dialysis  4.4-->3.7-->3.9-->4.1 today  Baseline appears to hover~3.2  Nephrology consulted as described above  Monitor renal function daily, renally dose meds, avoid nephrotoxic medication when possible    Acute on chronic anemia  Thrombocytopenia  H&H 7.7/22.5-->8.1/23.7 today  Defer further management to nephrology  Monitor CBC/signs of bleeding     Leukocytosis  Improving  23.1-->20.1-->16.4 today  Presently afebrile, vital signs stable  Monitor vital signs, monitor CBC    Continue IV Rocephin  Follow cultures: blood/urine cultures pending     Pyuria  Denies symptoms  Plan as above    Small right mastoid effusion noted on CT head  Patient denies right ear pain/discomfort, denies changes in hearing/drainage  Continue IV ABX as described above    Right basilar infiltrate and effusion  Appears comfortable on RA presently  Sputum culture when able  Encourage incentive spirometry  Monitor respiratory status     Elevated troponin  Stable  Flat, down-trending. Denies chest pain.  Likely 2/2 CKD, comorbidities as described above  Telemetry  Cardiology following--no recommendations    Mild bilateral lower extremity edema  Resolved   proBNP >19,000 on admission  Will defer to nephrology regarding resuming home PO Lasix  Echocardiogram ordered     History of HTN  Normotensive currently  Continue to hold home amlodipine, losartan, Lasix for now  Monitor vital signs     HLD  Continue home statin     DVT/GI   Heparin subcu/SCDs, PPI     Dispo: Pending PT/OT eval.  Patient is currently a resident at [facility] and would like to return.    8/18/2024: Denies acute events overnight.  Awake alert and oriented.  Family bedside.  Patient appears more alert and talkative.  Unable to obtain outstanding stool sample--no BMs reported overnight.  Reports resolved abdominal discomfort.  Denies nausea or vomiting.  SBO/ileus ruled out by general surgery this AM.  Okay to advance diet as tolerated by general surgery; will advance to full liquids today and monitor how patient tolerates this.  Has been evaluated by cardiology for elevated troponin and mild BLLE edema--no recommendations.  Resolved bilateral lower extremity edema this morning.  Remains comfortable on room air and denies chest pain or shortness of breath.  Echocardiogram has been ordered.  With improved hyponatremia this a.m.  Resolved hypokalemia, resolved hypomagnesia.   Improved leukocytosis and thrombocytopenia.  Have initiated  heparin subcu for VTE prophylaxis. Unfortunately with worsened kidney function and worsened calcium/albumin this morning.  Elevated CK.  Negligible improvement in bicarb. Has been started on sodium bicarb infusion by nephrology.  Awaiting formal evaluation from nephrology.              Karo Cowan, APRN-CNP

## 2024-08-18 NOTE — CONSULTS
Reason For Consult  Rule out small bowel obstruction    History Of Present Illness  Annetta Miguel is a 89 y.o. female admitted for several day history of diarrhea and fatigue.  She had multiple loose stools but denied any blood.  Denies any abdominal pain.  She has not been having any nausea or vomiting.  Has not had a bowel movement since being admitted to the hospital.  Feels like she might need to have one today.  Tolerating clears without any nausea or vomiting.  Never had any abdominal surgery before     Past Medical History  She has a past medical history of Cervical stenosis of spine, CKD (chronic kidney disease), HLD (hyperlipidemia), Hypertension, Insomnia, and Osteoporosis.    Surgical History  She has no past surgical history on file.     Social History  She reports that she has never smoked. She has never been exposed to tobacco smoke. She has never used smokeless tobacco. She reports that she does not currently use alcohol. She reports that she does not use drugs.    Family History  Family History   Problem Relation Name Age of Onset    Stroke Mother      Other (Heart Condition) Father      Diabetes Father      Heart disease Father      Other (Intestinal Complications) Sister      Parkinsonism Brother          Allergies  Oxycodone-acetaminophen, Other, and Oxycodone    Review of Systems  Review of Systems   Constitutional:  Positive for fatigue. Negative for chills, fever and unexpected weight change.   HENT:  Negative for sneezing, sore throat, trouble swallowing and voice change.    Respiratory:  Negative for chest tightness and shortness of breath.    Cardiovascular:  Negative for chest pain and palpitations.   Gastrointestinal:  Positive for diarrhea. Negative for abdominal pain, blood in stool, nausea and vomiting.   Endocrine: Negative for cold intolerance and heat intolerance.   Genitourinary:  Negative for decreased urine volume, dysuria and hematuria.   Musculoskeletal:  Negative for  "arthralgias and gait problem.   Skin:  Negative for rash and wound.   Neurological:  Negative for facial asymmetry, speech difficulty and headaches.   Hematological:  Negative for adenopathy. Does not bruise/bleed easily.   Psychiatric/Behavioral:  Negative for self-injury and suicidal ideas.           Physical Exam  Physical Exam  Vitals and nursing note reviewed.   Constitutional:       Appearance: Normal appearance.   HENT:      Head: Normocephalic and atraumatic.      Mouth/Throat:      Mouth: Mucous membranes are moist.      Pharynx: Oropharynx is clear.   Eyes:      Extraocular Movements: Extraocular movements intact.      Pupils: Pupils are equal, round, and reactive to light.   Cardiovascular:      Rate and Rhythm: Normal rate and regular rhythm.      Pulses: Normal pulses.   Pulmonary:      Effort: Pulmonary effort is normal.   Abdominal:      General: There is no distension.      Palpations: Abdomen is soft.      Tenderness: There is no abdominal tenderness.      Hernia: A hernia (Reducible umbilical) is present.   Musculoskeletal:      Cervical back: Normal range of motion and neck supple.   Skin:     General: Skin is warm and dry.   Neurological:      General: No focal deficit present.      Mental Status: She is alert and oriented to person, place, and time.   Psychiatric:         Mood and Affect: Mood normal.         Behavior: Behavior normal.            Last Recorded Vitals  Blood pressure 134/50, pulse 95, temperature 36.3 °C (97.3 °F), temperature source Temporal, resp. rate 17, height 1.575 m (5' 2\"), weight 63.5 kg (140 lb), SpO2 98%.    Relevant Results  Results for orders placed or performed during the hospital encounter of 08/17/24 (from the past 24 hour(s))   Urinalysis with Reflex Culture and Microscopic   Result Value Ref Range    Color, Urine Yellow Light-Yellow, Yellow, Dark-Yellow    Appearance, Urine Turbid (N) Clear    Specific Gravity, Urine 1.012 1.005 - 1.035    pH, Urine 6.0 5.0, " 5.5, 6.0, 6.5, 7.0, 7.5, 8.0    Protein, Urine 300 (3+) (A) NEGATIVE, 10 (TRACE), 20 (TRACE) mg/dL    Glucose, Urine Normal Normal mg/dL    Blood, Urine 1.0 (3+) (A) NEGATIVE    Ketones, Urine NEGATIVE NEGATIVE mg/dL    Bilirubin, Urine NEGATIVE NEGATIVE    Urobilinogen, Urine Normal Normal mg/dL    Nitrite, Urine NEGATIVE NEGATIVE    Leukocyte Esterase, Urine 500 Jessica/µL (A) NEGATIVE   Extra Urine Gray Tube   Result Value Ref Range    Extra Tube Hold for add-ons.    Microscopic Only, Urine   Result Value Ref Range    WBC, Urine >50 (A) 1-5, NONE /HPF    WBC Clumps, Urine FEW Reference range not established. /HPF    RBC, Urine 6-10 (A) NONE, 1-2, 3-5 /HPF    Bacteria, Urine 2+ (A) NONE SEEN /HPF   Serial Troponin, 2 Hour ([facility])   Result Value Ref Range    Troponin T, High Sensitivity 157 (HH) <=14 ng/L   Blood Gas Lactic Acid, Venous   Result Value Ref Range    POCT Lactate, Venous 1.0 0.4 - 2.0 mmol/L   Blood Culture    Specimen: Peripheral Venipuncture; Blood culture   Result Value Ref Range    Blood Culture Loaded on Instrument - Culture in progress    Blood Culture    Specimen: Peripheral Venipuncture; Blood culture   Result Value Ref Range    Blood Culture Loaded on Instrument - Culture in progress    Serial Troponin, 6 Hour ([facility])   Result Value Ref Range    Troponin T, High Sensitivity 135 (HH) <=14 ng/L   Comprehensive metabolic panel   Result Value Ref Range    Glucose 146 (H) 65 - 99 mg/dL    Sodium 127 (L) 133 - 145 mmol/L    Potassium 3.0 (L) 3.4 - 5.1 mmol/L    Chloride 95 (L) 97 - 107 mmol/L    Bicarbonate 13 (L) 24 - 31 mmol/L    Urea Nitrogen 62 (H) 8 - 25 mg/dL    Creatinine 3.70 (H) 0.40 - 1.60 mg/dL    eGFR 11 (L) >60 mL/min/1.73m*2    Calcium 5.5 (LL) 8.5 - 10.4 mg/dL    Albumin 2.6 (L) 3.5 - 5.0 g/dL    Alkaline Phosphatase 51 35 - 125 U/L    Total Protein 5.0 (L) 5.9 - 7.9 g/dL    AST 25 5 - 40 U/L    Bilirubin, Total 0.6 0.1 - 1.2 mg/dL    ALT 12 5 - 40 U/L    Anion Gap 19 <=19 mmol/L   CBC and  Auto Differential   Result Value Ref Range    WBC 20.1 (H) 4.4 - 11.3 x10*3/uL    nRBC 0.0 0.0 - 0.0 /100 WBCs    RBC 2.43 (L) 4.00 - 5.20 x10*6/uL    Hemoglobin 7.7 (L) 12.0 - 16.0 g/dL    Hematocrit 22.5 (L) 36.0 - 46.0 %    MCV 93 80 - 100 fL    MCH 31.7 26.0 - 34.0 pg    MCHC 34.2 32.0 - 36.0 g/dL    RDW 12.7 11.5 - 14.5 %    Platelets 115 (L) 150 - 450 x10*3/uL    Immature Granulocytes %, Automated 1.6 (H) 0.0 - 0.9 %    Immature Granulocytes Absolute, Automated 0.32 0.00 - 0.50 x10*3/uL   Magnesium   Result Value Ref Range    Magnesium 3.10 1.60 - 3.10 mg/dL   Manual Differential   Result Value Ref Range    Neutrophils %, Manual 80.0 40.0 - 80.0 %    Bands %, Manual 13.0 0.0 - 5.0 %    Lymphocytes %, Manual 3.0 13.0 - 44.0 %    Monocytes %, Manual 2.0 2.0 - 10.0 %    Eosinophils %, Manual 0.0 0.0 - 6.0 %    Basophils %, Manual 0.0 0.0 - 2.0 %    Metamyelocytes %, Manual 2.0 0.0 - 0.0 %    Seg Neutrophils Absolute, Manual 16.08 (H) 1.60 - 5.00 x10*3/uL    Bands Absolute, Manual 2.61 (H) 0.00 - 0.50 x10*3/uL    Lymphocytes Absolute, Manual 0.60 (L) 0.80 - 3.00 x10*3/uL    Monocytes Absolute, Manual 0.40 0.05 - 0.80 x10*3/uL    Eosinophils Absolute, Manual 0.00 0.00 - 0.40 x10*3/uL    Basophils Absolute, Manual 0.00 0.00 - 0.10 x10*3/uL    Metamyelocytes Absolute, Manual 0.40 0.00 - 0.00 x10*3/uL    Total Cells Counted 100     Neutrophils Absolute, Manual 18.69 (H) 1.60 - 5.50 x10*3/uL    RBC Morphology See Below     Ovalocytes Few     [place] Cells Many     Dohle Bodies Present     Vacuolated Neutrophils Present    Hemoglobin A1c   Result Value Ref Range    Hemoglobin A1C 5.7 (H) See below %    Estimated Average Glucose 117 Not Established mg/dL   NT Pro-BNP   Result Value Ref Range    PROBNP 19,580 (H) 0 - 624 pg/mL   Calcium, ionized   Result Value Ref Range    POCT Calcium, Ionized 0.72 (LL) 1.1 - 1.33 mmol/L   Iron and TIBC   Result Value Ref Range    Iron <20 (L) 30 - 160 ug/dL    UIBC 121 110 - 370 ug/dL     TIBC      % Saturation     Ferritin   Result Value Ref Range    Ferritin 862 (H) 13 - 150 ng/mL   Folate   Result Value Ref Range    Folate, Serum 8.3 4.2 - 19.9 ng/mL   Comprehensive metabolic panel   Result Value Ref Range    Glucose 84 65 - 99 mg/dL    Sodium 126 (L) 133 - 145 mmol/L    Potassium 3.7 3.4 - 5.1 mmol/L    Chloride 93 (L) 97 - 107 mmol/L    Bicarbonate 13 (L) 24 - 31 mmol/L    Urea Nitrogen 66 (H) 8 - 25 mg/dL    Creatinine 3.90 (H) 0.40 - 1.60 mg/dL    eGFR 10 (L) >60 mL/min/1.73m*2    Calcium 7.4 (L) 8.5 - 10.4 mg/dL    Albumin 2.9 (L) 3.5 - 5.0 g/dL    Alkaline Phosphatase 54 35 - 125 U/L    Total Protein 5.1 (L) 5.9 - 7.9 g/dL    AST 30 5 - 40 U/L    Bilirubin, Total 0.5 0.1 - 1.2 mg/dL    ALT 15 5 - 40 U/L    Anion Gap >19 (H) <=19 mmol/L   Magnesium   Result Value Ref Range    Magnesium 1.60 1.60 - 3.10 mg/dL   CBC and Auto Differential   Result Value Ref Range    WBC 16.4 (H) 4.4 - 11.3 x10*3/uL    nRBC 0.0 0.0 - 0.0 /100 WBCs    RBC 2.56 (L) 4.00 - 5.20 x10*6/uL    Hemoglobin 8.1 (L) 12.0 - 16.0 g/dL    Hematocrit 23.7 (L) 36.0 - 46.0 %    MCV 93 80 - 100 fL    MCH 31.6 26.0 - 34.0 pg    MCHC 34.2 32.0 - 36.0 g/dL    RDW 12.8 11.5 - 14.5 %    Platelets 126 (L) 150 - 450 x10*3/uL    Neutrophils % 92.0 40.0 - 80.0 %    Immature Granulocytes %, Automated 0.9 0.0 - 0.9 %    Lymphocytes % 3.5 13.0 - 44.0 %    Monocytes % 3.4 2.0 - 10.0 %    Eosinophils % 0.1 0.0 - 6.0 %    Basophils % 0.1 0.0 - 2.0 %    Neutrophils Absolute 15.10 (H) 1.60 - 5.50 x10*3/uL    Immature Granulocytes Absolute, Automated 0.15 0.00 - 0.50 x10*3/uL    Lymphocytes Absolute 0.58 (L) 0.80 - 3.00 x10*3/uL    Monocytes Absolute 0.56 0.05 - 0.80 x10*3/uL    Eosinophils Absolute 0.01 0.00 - 0.40 x10*3/uL    Basophils Absolute 0.02 0.00 - 0.10 x10*3/uL   Comprehensive metabolic panel   Result Value Ref Range    Glucose 74 65 - 99 mg/dL    Sodium 129 (L) 133 - 145 mmol/L    Potassium 4.0 3.4 - 5.1 mmol/L    Chloride 98 97 - 107  mmol/L    Bicarbonate 14 (L) 24 - 31 mmol/L    Urea Nitrogen 66 (H) 8 - 25 mg/dL    Creatinine 4.10 (H) 0.40 - 1.60 mg/dL    eGFR 10 (L) >60 mL/min/1.73m*2    Calcium 6.4 (L) 8.5 - 10.4 mg/dL    Albumin 2.7 (L) 3.5 - 5.0 g/dL    Alkaline Phosphatase 53 35 - 125 U/L    Total Protein 5.1 (L) 5.9 - 7.9 g/dL    AST 27 5 - 40 U/L    Bilirubin, Total 0.4 0.1 - 1.2 mg/dL    ALT 14 5 - 40 U/L    Anion Gap 17 <=19 mmol/L   Creatine Kinase   Result Value Ref Range    Creatine Kinase 348 (H) 24 - 195 U/L     CT head wo IV contrast    Result Date: 8/17/2024  Interpreted By:  Johnathan Hamilton, STUDY: CT HEAD WO IV CONTRAST;  8/17/2024 10:53 pm   INDICATION: Signs/Symptoms:s/p unwitnessed fall at home.   COMPARISON: None.   ACCESSION NUMBER(S): [id number]   ORDERING CLINICIAN: MIGUEL A SCOTT   TECHNIQUE: Noncontrast axial CT images of head were obtained with coronal and sagittal reconstructed images.   FINDINGS: BRAIN PARENCHYMA: Generalized cerebral volume loss. No mass-effect, midline shift or effacement of cerebral sulci. Patchy periventricular and subcortical white matter hypodensities, nonspecific but often seen in the setting of chronic microangiopathic change.   HEMORRHAGE: No acute intracranial hemorrhage.   VENTRICLES and EXTRA-AXIAL SPACES: The ventricles and sulci are within normal limits for brain volume. No abnormal extra-axial fluid collection.   ORBITS: The visualized orbits and globes are within normal limits.   EXTRACRANIAL SOFT TISSUES: Within normal limits.   PARANASAL SINUSES/MASTOIDS: Visualized paranasal sinuses are well aerated. Small right mastoid effusion.   CALVARIUM: No depressed skull fracture.         1. No acute intracranial abnormality identified. 2. Moderate cerebral volume loss and nonspecific white matter changes, likely reflecting sequela of chronic microangiopathic change. 3. Small right mastoid effusion, correlate for evidence of mastoiditis.       MACRO: None   Signed by: Johnathan Hamilton 8/17/2024  11:49 PM Dictation workstation:   [id number]    CT abdomen pelvis wo IV contrast    Result Date: 8/17/2024  Interpreted By:  Aldo Vidal, STUDY: CT ABDOMEN PELVIS WO IV CONTRAST;  8/17/2024 12:22 pm   INDICATION: Signs/Symptoms:diarrhea with leukocytosis.   COMPARISON: None   ACCESSION NUMBER(S): [id number]   ORDERING CLINICIAN: JEAN MARIE SIMON   TECHNIQUE: CT of the abdomen and pelvis was performed. Contiguous axial images were obtained at 3 mm slice thickness through the abdomen and pelvis. Coronal and sagittal reconstructions at 3 mm slice thickness were performed.  No intravenous contrast was administered.   FINDINGS: Please note that the evaluation of vessels, lymph nodes and organs is limited without intravenous contrast.   LOWER CHEST: The lung bases demonstrate hyperinflation. Right basilar pleural thickening and posterior infiltrate. Punctate subpleural nodule in right middle lobe may represent punctate scarring. Calcified granulomas in the lingula.   ABDOMEN:   LIVER: The liver is remarkable for multiple hypodensities which can not be characterized without IV contrast. These may represent simple cysts.   BILE DUCTS: No bile duct dilatation.   GALLBLADDER: Prior cholecystectomy.   PANCREAS: Unremarkable pancreas.   SPLEEN: Punctate calcified splenic granulomas.   ADRENAL GLANDS: No adrenal masses.     KIDNEYS AND URETERS: Multiple renal hypodensities. These are too numerous to count. They may represent combination of simple and complex cysts but not fully characterized without IV contrast.   PELVIS:   BLADDER: Mostly obscured by the total right hip arthroplasty.   REPRODUCTIVE ORGANS: No definite masses.   BOWEL: Multiple fluid-filled dilated loops of jejunum. Few nondilated loops of more distal ileum. Findings may due partial small bowel obstruction. No definite focal inflammatory changes. Diverticulosis without definite diverticulitis. Intraluminal fat density in a loop of bowel in the right  aspect of the abdomen likely within the ascending colon may represent focal intussusception of unknown etiology or significance. Short-term follow-up CT to further evaluate or following oral contrast as warranted. No evidence of associated obstruction.   VESSELS: Aortic calcification and tortuosity.   PERITONEUM/RETROPERITONEUM/LYMPH NODES: No retroperitoneal adenopathy. No ascites.   ABDOMINAL WALL: Small fat containing umbilical hernia.       BONES: Multilevel discogenic degenerative changes. Compression fracture deformity of L1 vertebral body of indeterminate age.       Sigmoid diverticulosis without definite diverticulitis.   Multiple fluid-filled and slightly dilated loops of jejunum. Nondilated loops of ileum. Findings may be due to focal ileus or partial small bowel obstruction. Follow-up as clinically warranted.   Intraluminal fat density in a loop of bowel in the right aspect of the abdomen likely within the ascending colon may represent focal intussusception of unknown etiology or significance. Short-term follow-up CT to further evaluate or following oral contrast as warranted. No evidence of associated obstruction.   Numerous hypodensities within the liver and both kidneys may represent combination of simple and complex cysts but not fully characterized without IV contrast. Follow-up nonemergent triple phase CT or MRI of the liver and kidneys as clinically warranted.   Compression fracture deformity of L1 vertebral body of indeterminate age.   Right basilar infiltrate and effusion. Follow-up is recommended   MACRO: None   Signed by: Aldo Vidal 8/17/2024 1:57 PM Dictation workstation:   [id number]    XR chest 1 view    Result Date: 8/17/2024  Interpreted By:  Aaron Biswas, STUDY: XR CHEST 1 VIEW;  8/17/2024 11:08 am   INDICATION: Signs/Symptoms:malaise/fatigue.   COMPARISON: Chest radiograph 08/09/2018   ACCESSION NUMBER(S): [id number]   ORDERING CLINICIAN: NJ SPENCE   FINDINGS:      CARDIOMEDIASTINAL SILHOUETTE: Cardiomediastinal silhouette is stable in size and configuration.   LUNGS: No consolidation, pneumothorax, or significant effusion. Subcentimeter calcified nodule left lung base compatible with old granulomatous disease.   ABDOMEN: No remarkable upper abdominal findings.   BONES: No acute osseous changes.       1.  No evidence of acute cardiopulmonary process.     Signed by: Aaron Biswas 8/17/2024 12:34 PM Dictation workstation:   [id number]        Assessment/Plan     Dilated small bowel loops    89-year-old female admitted with diarrhea and fatigue.  Imaging showed a few fluid-filled dilated small bowel loops in the jejunum.  She has never had any surgery before so not secondary to adhesive disease.  Given her symptoms of diarrhea, this is likely secondary to enteritis.  She is not having any abdominal pain or nausea or vomiting.  Can advance diet as tolerated.  Please call with additional questions or concerns      Ashley Gudino MD

## 2024-08-18 NOTE — CONSULTS
.Reason For Consult  Hyponatremia, chronic kidney disease stage IV/V, and anemia    History Of Present Illness  Annetta Miguel is a 89 y.o. female who is very well-known to my practice she has underlying CKD stage IV/V she absolutely medically her to me in the office along with her family that if dialysis time comes she would not go on dialysis therapy, she also known to have a history of hypertension hyperlipidemia anemia of chronic kidney disease and secondary hyperparathyroidism secondary to her chronic kidney disease presented to the emergency room with 3 to 4 days severe diarrhea feeling extremely weak and tired she had dyspnea on exertion she had lost her appetite and having dry heaves she was found to have extremely low sodium level also worsening renal function and that is why renal consultation is obtained the patient is awake and responsive her daughter Taina is by the bedside however her mentation is very clear she is awake alert oriented x 3.     Review of Systems  Review of Systems   Constitutional:  Positive for activity change, appetite change, fatigue and unexpected weight change. Negative for chills and fever.   HENT:  Negative for sinus pressure, sore throat and tinnitus.    Eyes:  Negative for photophobia and visual disturbance.   Respiratory:  Negative for apnea, cough, shortness of breath and wheezing.    Cardiovascular:  Negative for chest pain, palpitations and leg swelling.   Gastrointestinal:  Positive for diarrhea and nausea. Negative for abdominal pain, blood in stool, constipation, rectal pain and vomiting.   Endocrine: Negative for cold intolerance, heat intolerance, polydipsia, polyphagia and polyuria.   Genitourinary:  Negative for decreased urine volume, dysuria, frequency, hematuria and urgency.   Musculoskeletal:  Negative for arthralgias, back pain, joint swelling, myalgias and neck pain.   Skin:  Negative for color change, pallor, rash and wound.   Neurological:  Positive  for weakness. Negative for dizziness, tremors, seizures, syncope, speech difficulty, light-headedness, numbness and headaches.   Hematological:  Negative for adenopathy. Does not bruise/bleed easily.   Psychiatric/Behavioral:  Negative for confusion, hallucinations, sleep disturbance and suicidal ideas.         Past Medical History  She has a past medical history of Cervical stenosis of spine, CKD (chronic kidney disease), HLD (hyperlipidemia), Hypertension, Insomnia, and Osteoporosis.    Surgical History  She has no past surgical history on file.     Social History  She reports that she has never smoked. She has never been exposed to tobacco smoke. She has never used smokeless tobacco. She reports that she does not currently use alcohol. She reports that she does not use drugs.    Family History  Family History   Problem Relation Name Age of Onset    Stroke Mother      Other (Heart Condition) Father      Diabetes Father      Heart disease Father      Other (Intestinal Complications) Sister      Parkinsonism Brother          Current Facility-Administered Medications:     atorvastatin (Lipitor) tablet 10 mg, 10 mg, oral, Nightly, Karo P Amusat, APRN-CNP, 10 mg at 08/17/24 2120    calcitriol (Rocaltrol) capsule 0.25 mcg, 0.25 mcg, oral, Daily, Karo P Amusat, APRN-CNP, 0.25 mcg at 08/18/24 0841    cefTRIAXone (Rocephin) 1 g in dextrose (iso) IV 50 mL, 1 g, intravenous, q24h, Karo P Amusat, APRN-CNP, Stopped at 08/18/24 0911    ferrous sulfate (325 mg ferrous sulfate) tablet 1 tablet, 65 mg of iron, oral, Daily with breakfast, Karo P Amusat, APRN-CNP, 1 tablet at 08/18/24 0841    heparin (porcine) injection 5,000 Units, 5,000 Units, subcutaneous, q12h NOÉ, Karo P Amusat, APRN-CNP, 5,000 Units at 08/18/24 0841    ondansetron (Zofran) injection 4 mg, 4 mg, intravenous, q8h PRN, Karo P Amusat, APRN-CNP    pantoprazole (ProtoNix) EC tablet 40 mg, 40 mg, oral, Daily before breakfast, 40 mg at 08/18/24 0607 **OR**  pantoprazole (ProtoNix) injection 40 mg, 40 mg, intravenous, Daily before breakfast, Karo P Amusat, APRN-CNP    perflutren lipid microspheres (Definity) injection 0.5-10 mL of dilution, 0.5-10 mL of dilution, intravenous, Once in imaging, Karo P Amusat, APRN-CNP    perflutren protein A microsphere (Optison) injection 0.5 mL, 0.5 mL, intravenous, Once in imaging, Karo P Amusat, APRN-CNP    sevelamer carbonate (Renvela) tablet 800 mg, 800 mg, oral, TID, Karo P Amusat, APRN-CNP, 800 mg at 08/18/24 0841    sodium bicarbonate 150 mEq in dextrose 5% 1,150 mL infusion, 75 mL/hr, intravenous, Continuous, Antione Littlejohn MD, Last Rate: 75 mL/hr at 08/18/24 0920, 75 mL/hr at 08/18/24 0920    sulfur hexafluoride microsphr (Lumason) injection 24.28 mg, 2 mL, intravenous, Once in imaging, Karo BARRON Amusat, APRN-CNP   Allergies  Oxycodone-acetaminophen, Other, and Oxycodone         Physical Exam  Physical Exam         I&O 24HR    Intake/Output Summary (Last 24 hours) at 8/18/2024 1137  Last data filed at 8/18/2024 0800  Gross per 24 hour   Intake 2499.5 ml   Output 600 ml   Net 1899.5 ml       Vitals 24HR  Heart Rate:  []   Temp:  [36.3 °C (97.3 °F)-36.7 °C (98.1 °F)]   Resp:  [12-24]   BP: ()/(46-64)   SpO2:  [97 %-100 %]     Relevant Results        Results for orders placed or performed during the hospital encounter of 08/17/24 (from the past 96 hour(s))   CBC and Auto Differential   Result Value Ref Range    WBC 23.1 (H) 4.4 - 11.3 x10*3/uL    nRBC 0.0 0.0 - 0.0 /100 WBCs    RBC 2.46 (L) 4.00 - 5.20 x10*6/uL    Hemoglobin 7.9 (L) 12.0 - 16.0 g/dL    Hematocrit 22.7 (L) 36.0 - 46.0 %    MCV 92 80 - 100 fL    MCH 32.1 26.0 - 34.0 pg    MCHC 34.8 32.0 - 36.0 g/dL    RDW 12.8 11.5 - 14.5 %    Platelets 130 (L) 150 - 450 x10*3/uL    Immature Granulocytes %, Automated 2.8 (H) 0.0 - 0.9 %    Immature Granulocytes Absolute, Automated 0.65 (H) 0.00 - 0.50 x10*3/uL   Comprehensive metabolic panel   Result Value Ref Range     Glucose 105 (H) 65 - 99 mg/dL    Sodium 126 (L) 133 - 145 mmol/L    Potassium 3.5 3.4 - 5.1 mmol/L    Chloride 93 (L) 97 - 107 mmol/L    Bicarbonate 14 (L) 24 - 31 mmol/L    Urea Nitrogen 65 (H) 8 - 25 mg/dL    Creatinine 4.40 (H) 0.40 - 1.60 mg/dL    eGFR 9 (L) >60 mL/min/1.73m*2    Calcium 5.8 (LL) 8.5 - 10.4 mg/dL    Albumin 3.0 (L) 3.5 - 5.0 g/dL    Alkaline Phosphatase 65 35 - 125 U/L    Total Protein 5.8 (L) 5.9 - 7.9 g/dL    AST 27 5 - 40 U/L    Bilirubin, Total 0.9 0.1 - 1.2 mg/dL    ALT 15 5 - 40 U/L    Anion Gap 19 <=19 mmol/L   Sars-CoV-2 PCR   Result Value Ref Range    Coronavirus 2019, PCR Not Detected Not Detected   Serial Troponin, Initial ([facility])   Result Value Ref Range    Troponin T, High Sensitivity 176 (HH) <=14 ng/L   Manual Differential   Result Value Ref Range    Neutrophils %, Manual 83.0 40.0 - 80.0 %    Bands %, Manual 10.0 0.0 - 5.0 %    Lymphocytes %, Manual 4.0 13.0 - 44.0 %    Monocytes %, Manual 2.0 2.0 - 10.0 %    Eosinophils %, Manual 0.0 0.0 - 6.0 %    Basophils %, Manual 0.0 0.0 - 2.0 %    Metamyelocytes %, Manual 1.0 0.0 - 0.0 %    Seg Neutrophils Absolute, Manual 19.17 (H) 1.60 - 5.00 x10*3/uL    Bands Absolute, Manual 2.31 (H) 0.00 - 0.50 x10*3/uL    Lymphocytes Absolute, Manual 0.92 0.80 - 3.00 x10*3/uL    Monocytes Absolute, Manual 0.46 0.05 - 0.80 x10*3/uL    Eosinophils Absolute, Manual 0.00 0.00 - 0.40 x10*3/uL    Basophils Absolute, Manual 0.00 0.00 - 0.10 x10*3/uL    Metamyelocytes Absolute, Manual 0.23 0.00 - 0.00 x10*3/uL    Total Cells Counted 100     Neutrophils Absolute, Manual 21.48 (H) 1.60 - 5.50 x10*3/uL    RBC Morphology See Below     Ovalocytes Few     John Cells Many     Dohle Bodies Present     Vacuolated Neutrophils Present     Clumped Platelets Present    Magnesium   Result Value Ref Range    Magnesium 1.10 (L) 1.60 - 3.10 mg/dL   Urinalysis with Reflex Culture and Microscopic   Result Value Ref Range    Color, Urine Yellow Light-Yellow, Yellow, Dark-Yellow     Appearance, Urine Turbid (N) Clear    Specific Gravity, Urine 1.012 1.005 - 1.035    pH, Urine 6.0 5.0, 5.5, 6.0, 6.5, 7.0, 7.5, 8.0    Protein, Urine 300 (3+) (A) NEGATIVE, 10 (TRACE), 20 (TRACE) mg/dL    Glucose, Urine Normal Normal mg/dL    Blood, Urine 1.0 (3+) (A) NEGATIVE    Ketones, Urine NEGATIVE NEGATIVE mg/dL    Bilirubin, Urine NEGATIVE NEGATIVE    Urobilinogen, Urine Normal Normal mg/dL    Nitrite, Urine NEGATIVE NEGATIVE    Leukocyte Esterase, Urine 500 Jessica/µL (A) NEGATIVE   Extra Urine Gray Tube   Result Value Ref Range    Extra Tube Hold for add-ons.    Microscopic Only, Urine   Result Value Ref Range    WBC, Urine >50 (A) 1-5, NONE /HPF    WBC Clumps, Urine FEW Reference range not established. /HPF    RBC, Urine 6-10 (A) NONE, 1-2, 3-5 /HPF    Bacteria, Urine 2+ (A) NONE SEEN /HPF   Serial Troponin, 2 Hour ([facility])   Result Value Ref Range    Troponin T, High Sensitivity 157 (HH) <=14 ng/L   Blood Gas Lactic Acid, Venous   Result Value Ref Range    POCT Lactate, Venous 1.0 0.4 - 2.0 mmol/L   Blood Culture    Specimen: Peripheral Venipuncture; Blood culture   Result Value Ref Range    Blood Culture Loaded on Instrument - Culture in progress    Blood Culture    Specimen: Peripheral Venipuncture; Blood culture   Result Value Ref Range    Blood Culture Loaded on Instrument - Culture in progress    Serial Troponin, 6 Hour ([facility])   Result Value Ref Range    Troponin T, High Sensitivity 135 (HH) <=14 ng/L   Comprehensive metabolic panel   Result Value Ref Range    Glucose 146 (H) 65 - 99 mg/dL    Sodium 127 (L) 133 - 145 mmol/L    Potassium 3.0 (L) 3.4 - 5.1 mmol/L    Chloride 95 (L) 97 - 107 mmol/L    Bicarbonate 13 (L) 24 - 31 mmol/L    Urea Nitrogen 62 (H) 8 - 25 mg/dL    Creatinine 3.70 (H) 0.40 - 1.60 mg/dL    eGFR 11 (L) >60 mL/min/1.73m*2    Calcium 5.5 (LL) 8.5 - 10.4 mg/dL    Albumin 2.6 (L) 3.5 - 5.0 g/dL    Alkaline Phosphatase 51 35 - 125 U/L    Total Protein 5.0 (L) 5.9 - 7.9 g/dL    AST 25 5  - 40 U/L    Bilirubin, Total 0.6 0.1 - 1.2 mg/dL    ALT 12 5 - 40 U/L    Anion Gap 19 <=19 mmol/L   CBC and Auto Differential   Result Value Ref Range    WBC 20.1 (H) 4.4 - 11.3 x10*3/uL    nRBC 0.0 0.0 - 0.0 /100 WBCs    RBC 2.43 (L) 4.00 - 5.20 x10*6/uL    Hemoglobin 7.7 (L) 12.0 - 16.0 g/dL    Hematocrit 22.5 (L) 36.0 - 46.0 %    MCV 93 80 - 100 fL    MCH 31.7 26.0 - 34.0 pg    MCHC 34.2 32.0 - 36.0 g/dL    RDW 12.7 11.5 - 14.5 %    Platelets 115 (L) 150 - 450 x10*3/uL    Immature Granulocytes %, Automated 1.6 (H) 0.0 - 0.9 %    Immature Granulocytes Absolute, Automated 0.32 0.00 - 0.50 x10*3/uL   Magnesium   Result Value Ref Range    Magnesium 3.10 1.60 - 3.10 mg/dL   Manual Differential   Result Value Ref Range    Neutrophils %, Manual 80.0 40.0 - 80.0 %    Bands %, Manual 13.0 0.0 - 5.0 %    Lymphocytes %, Manual 3.0 13.0 - 44.0 %    Monocytes %, Manual 2.0 2.0 - 10.0 %    Eosinophils %, Manual 0.0 0.0 - 6.0 %    Basophils %, Manual 0.0 0.0 - 2.0 %    Metamyelocytes %, Manual 2.0 0.0 - 0.0 %    Seg Neutrophils Absolute, Manual 16.08 (H) 1.60 - 5.00 x10*3/uL    Bands Absolute, Manual 2.61 (H) 0.00 - 0.50 x10*3/uL    Lymphocytes Absolute, Manual 0.60 (L) 0.80 - 3.00 x10*3/uL    Monocytes Absolute, Manual 0.40 0.05 - 0.80 x10*3/uL    Eosinophils Absolute, Manual 0.00 0.00 - 0.40 x10*3/uL    Basophils Absolute, Manual 0.00 0.00 - 0.10 x10*3/uL    Metamyelocytes Absolute, Manual 0.40 0.00 - 0.00 x10*3/uL    Total Cells Counted 100     Neutrophils Absolute, Manual 18.69 (H) 1.60 - 5.50 x10*3/uL    RBC Morphology See Below     Ovalocytes Few     John Cells Many     Dohle Bodies Present     Vacuolated Neutrophils Present    Hemoglobin A1c   Result Value Ref Range    Hemoglobin A1C 5.7 (H) See below %    Estimated Average Glucose 117 Not Established mg/dL   NT Pro-BNP   Result Value Ref Range    PROBNP 19,580 (H) 0 - 624 pg/mL   Calcium, ionized   Result Value Ref Range    POCT Calcium, Ionized 0.72 (LL) 1.1 - 1.33  mmol/L   Iron and TIBC   Result Value Ref Range    Iron <20 (L) 30 - 160 ug/dL    UIBC 121 110 - 370 ug/dL    TIBC      % Saturation     Ferritin   Result Value Ref Range    Ferritin 862 (H) 13 - 150 ng/mL   Folate   Result Value Ref Range    Folate, Serum 8.3 4.2 - 19.9 ng/mL   Comprehensive metabolic panel   Result Value Ref Range    Glucose 84 65 - 99 mg/dL    Sodium 126 (L) 133 - 145 mmol/L    Potassium 3.7 3.4 - 5.1 mmol/L    Chloride 93 (L) 97 - 107 mmol/L    Bicarbonate 13 (L) 24 - 31 mmol/L    Urea Nitrogen 66 (H) 8 - 25 mg/dL    Creatinine 3.90 (H) 0.40 - 1.60 mg/dL    eGFR 10 (L) >60 mL/min/1.73m*2    Calcium 7.4 (L) 8.5 - 10.4 mg/dL    Albumin 2.9 (L) 3.5 - 5.0 g/dL    Alkaline Phosphatase 54 35 - 125 U/L    Total Protein 5.1 (L) 5.9 - 7.9 g/dL    AST 30 5 - 40 U/L    Bilirubin, Total 0.5 0.1 - 1.2 mg/dL    ALT 15 5 - 40 U/L    Anion Gap >19 (H) <=19 mmol/L   Magnesium   Result Value Ref Range    Magnesium 1.60 1.60 - 3.10 mg/dL   CBC and Auto Differential   Result Value Ref Range    WBC 16.4 (H) 4.4 - 11.3 x10*3/uL    nRBC 0.0 0.0 - 0.0 /100 WBCs    RBC 2.56 (L) 4.00 - 5.20 x10*6/uL    Hemoglobin 8.1 (L) 12.0 - 16.0 g/dL    Hematocrit 23.7 (L) 36.0 - 46.0 %    MCV 93 80 - 100 fL    MCH 31.6 26.0 - 34.0 pg    MCHC 34.2 32.0 - 36.0 g/dL    RDW 12.8 11.5 - 14.5 %    Platelets 126 (L) 150 - 450 x10*3/uL    Neutrophils % 92.0 40.0 - 80.0 %    Immature Granulocytes %, Automated 0.9 0.0 - 0.9 %    Lymphocytes % 3.5 13.0 - 44.0 %    Monocytes % 3.4 2.0 - 10.0 %    Eosinophils % 0.1 0.0 - 6.0 %    Basophils % 0.1 0.0 - 2.0 %    Neutrophils Absolute 15.10 (H) 1.60 - 5.50 x10*3/uL    Immature Granulocytes Absolute, Automated 0.15 0.00 - 0.50 x10*3/uL    Lymphocytes Absolute 0.58 (L) 0.80 - 3.00 x10*3/uL    Monocytes Absolute 0.56 0.05 - 0.80 x10*3/uL    Eosinophils Absolute 0.01 0.00 - 0.40 x10*3/uL    Basophils Absolute 0.02 0.00 - 0.10 x10*3/uL   Comprehensive metabolic panel   Result Value Ref Range    Glucose  74 65 - 99 mg/dL    Sodium 129 (L) 133 - 145 mmol/L    Potassium 4.0 3.4 - 5.1 mmol/L    Chloride 98 97 - 107 mmol/L    Bicarbonate 14 (L) 24 - 31 mmol/L    Urea Nitrogen 66 (H) 8 - 25 mg/dL    Creatinine 4.10 (H) 0.40 - 1.60 mg/dL    eGFR 10 (L) >60 mL/min/1.73m*2    Calcium 6.4 (L) 8.5 - 10.4 mg/dL    Albumin 2.7 (L) 3.5 - 5.0 g/dL    Alkaline Phosphatase 53 35 - 125 U/L    Total Protein 5.1 (L) 5.9 - 7.9 g/dL    AST 27 5 - 40 U/L    Bilirubin, Total 0.4 0.1 - 1.2 mg/dL    ALT 14 5 - 40 U/L    Anion Gap 17 <=19 mmol/L   Creatine Kinase   Result Value Ref Range    Creatine Kinase 348 (H) 24 - 195 U/L          Assessment/Plan     CT head wo IV contrast    Result Date: 8/17/2024  Interpreted By:  Johnathan Hamilton, STUDY: CT HEAD WO IV CONTRAST;  8/17/2024 10:53 pm   INDICATION: Signs/Symptoms:s/p unwitnessed fall at home.   COMPARISON: None.   ACCESSION NUMBER(S): [id number]   ORDERING CLINICIAN: MIGUEL A SCOTT   TECHNIQUE: Noncontrast axial CT images of head were obtained with coronal and sagittal reconstructed images.   FINDINGS: BRAIN PARENCHYMA: Generalized cerebral volume loss. No mass-effect, midline shift or effacement of cerebral sulci. Patchy periventricular and subcortical white matter hypodensities, nonspecific but often seen in the setting of chronic microangiopathic change.   HEMORRHAGE: No acute intracranial hemorrhage.   VENTRICLES and EXTRA-AXIAL SPACES: The ventricles and sulci are within normal limits for brain volume. No abnormal extra-axial fluid collection.   ORBITS: The visualized orbits and globes are within normal limits.   EXTRACRANIAL SOFT TISSUES: Within normal limits.   PARANASAL SINUSES/MASTOIDS: Visualized paranasal sinuses are well aerated. Small right mastoid effusion.   CALVARIUM: No depressed skull fracture.         1. No acute intracranial abnormality identified. 2. Moderate cerebral volume loss and nonspecific white matter changes, likely reflecting sequela of chronic microangiopathic  change. 3. Small right mastoid effusion, correlate for evidence of mastoiditis.       MACRO: None   Signed by: Johnathan Hamilton 8/17/2024 11:49 PM Dictation workstation:   [id number]    CT abdomen pelvis wo IV contrast    Result Date: 8/17/2024  Interpreted By:  Aldo Vidal, STUDY: CT ABDOMEN PELVIS WO IV CONTRAST;  8/17/2024 12:22 pm   INDICATION: Signs/Symptoms:diarrhea with leukocytosis.   COMPARISON: None   ACCESSION NUMBER(S): [id number]   ORDERING CLINICIAN: JEAN MARIE SIMON   TECHNIQUE: CT of the abdomen and pelvis was performed. Contiguous axial images were obtained at 3 mm slice thickness through the abdomen and pelvis. Coronal and sagittal reconstructions at 3 mm slice thickness were performed.  No intravenous contrast was administered.   FINDINGS: Please note that the evaluation of vessels, lymph nodes and organs is limited without intravenous contrast.   LOWER CHEST: The lung bases demonstrate hyperinflation. Right basilar pleural thickening and posterior infiltrate. Punctate subpleural nodule in right middle lobe may represent punctate scarring. Calcified granulomas in the lingula.   ABDOMEN:   LIVER: The liver is remarkable for multiple hypodensities which can not be characterized without IV contrast. These may represent simple cysts.   BILE DUCTS: No bile duct dilatation.   GALLBLADDER: Prior cholecystectomy.   PANCREAS: Unremarkable pancreas.   SPLEEN: Punctate calcified splenic granulomas.   ADRENAL GLANDS: No adrenal masses.     KIDNEYS AND URETERS: Multiple renal hypodensities. These are too numerous to count. They may represent combination of simple and complex cysts but not fully characterized without IV contrast.   PELVIS:   BLADDER: Mostly obscured by the total right hip arthroplasty.   REPRODUCTIVE ORGANS: No definite masses.   BOWEL: Multiple fluid-filled dilated loops of jejunum. Few nondilated loops of more distal ileum. Findings may due partial small bowel obstruction. No definite  focal inflammatory changes. Diverticulosis without definite diverticulitis. Intraluminal fat density in a loop of bowel in the right aspect of the abdomen likely within the ascending colon may represent focal intussusception of unknown etiology or significance. Short-term follow-up CT to further evaluate or following oral contrast as warranted. No evidence of associated obstruction.   VESSELS: Aortic calcification and tortuosity.   PERITONEUM/RETROPERITONEUM/LYMPH NODES: No retroperitoneal adenopathy. No ascites.   ABDOMINAL WALL: Small fat containing umbilical hernia.       BONES: Multilevel discogenic degenerative changes. Compression fracture deformity of L1 vertebral body of indeterminate age.       Sigmoid diverticulosis without definite diverticulitis.   Multiple fluid-filled and slightly dilated loops of jejunum. Nondilated loops of ileum. Findings may be due to focal ileus or partial small bowel obstruction. Follow-up as clinically warranted.   Intraluminal fat density in a loop of bowel in the right aspect of the abdomen likely within the ascending colon may represent focal intussusception of unknown etiology or significance. Short-term follow-up CT to further evaluate or following oral contrast as warranted. No evidence of associated obstruction.   Numerous hypodensities within the liver and both kidneys may represent combination of simple and complex cysts but not fully characterized without IV contrast. Follow-up nonemergent triple phase CT or MRI of the liver and kidneys as clinically warranted.   Compression fracture deformity of L1 vertebral body of indeterminate age.   Right basilar infiltrate and effusion. Follow-up is recommended   MACRO: None   Signed by: Aldo Vidal 8/17/2024 1:57 PM Dictation workstation:   [id number]    XR chest 1 view    Result Date: 8/17/2024  Interpreted By:  Aaron Biswas, STUDY: XR CHEST 1 VIEW;  8/17/2024 11:08 am   INDICATION: Signs/Symptoms:malaise/fatigue.    COMPARISON: Chest radiograph 08/09/2018   ACCESSION NUMBER(S): [id number]   ORDERING CLINICIAN: NJ SPENCE   FINDINGS:     CARDIOMEDIASTINAL SILHOUETTE: Cardiomediastinal silhouette is stable in size and configuration.   LUNGS: No consolidation, pneumothorax, or significant effusion. Subcentimeter calcified nodule left lung base compatible with old granulomatous disease.   ABDOMEN: No remarkable upper abdominal findings.   BONES: No acute osseous changes.       1.  No evidence of acute cardiopulmonary process.     Signed by: Aaron Biswas 8/17/2024 12:34 PM Dictation workstation:   [id number]     Impression:  Chronic kidney stage V I believe the patient does have uremic symptoms  Severe metabolic acidosis secondary to her kidney disease  Hyponatremia secondary to volume depletion  Hypomagnesemia secondary to GI loss with severe diarrhea  Per calcium is secondary to hypomagnesemia and her chronic kidney disease rule out vitamin D deficiency  Acute urinary tract infection  Out C. difficile which I doubt  Anemia of chronic kidney disease  Secondary hyperparathyroidism  Bilateral renal cysts    Recommendations:  Gentle IV hydration  Start sodium bicarbonate drip  Replace magnesium and potassium  Vitamin D level  Stool for C. Difficile  Erythropoietin therapy for anemia  Under no circumstances the patient will agree for dialysis therapy so we will continue only conservative therapy discussed with the daughter in details      Thank You very much for your consultation  Antione Littlejohn MDInpatient consult to Nephrology  Consult performed by: Antione Littlejohn MD  Consult ordered by: Desiree Blake MD

## 2024-08-18 NOTE — CARE PLAN
Problem: Skin  Goal: Decreased wound size/increased tissue granulation at next dressing change  Outcome: Progressing  Flowsheets (Taken 8/18/2024 1256)  Decreased wound size/increased tissue granulation at next dressing change:   Protective dressings over bony prominences   Utilize specialty bed per algorithm   Promote sleep for wound healing  Goal: Participates in plan/prevention/treatment measures  Outcome: Progressing  Flowsheets (Taken 8/18/2024 1256)  Participates in plan/prevention/treatment measures:   Elevate heels   Increase activity/out of bed for meals  Goal: Prevent/manage excess moisture  Outcome: Progressing  Flowsheets (Taken 8/18/2024 1256)  Prevent/manage excess moisture:   Cleanse incontinence/protect with barrier cream   Monitor for/manage infection if present   Follow provider orders for dressing changes   Moisturize dry skin  Goal: Prevent/minimize sheer/friction injuries  Outcome: Progressing  Flowsheets (Taken 8/18/2024 1256)  Prevent/minimize sheer/friction injuries:   Complete micro-shifts as needed if patient unable. Adjust patient position to relieve pressure points, not a full turn   Increase activity/out of bed for meals   Use pull sheet   HOB 30 degrees or less   Utilize specialty bed per algorithm   Turn/reposition every 2 hours/use positioning/transfer devices  Goal: Promote/optimize nutrition  Outcome: Progressing  Flowsheets (Taken 8/18/2024 1256)  Promote/optimize nutrition: Monitor/record intake including meals  Goal: Promote skin healing  Outcome: Progressing  Flowsheets (Taken 8/18/2024 1256)  Promote skin healing:   Assess skin/pad under line(s)/device(s)   Protective dressings over bony prominences   Turn/reposition every 2 hours/use positioning/transfer devices   Ensure correct size (line/device) and apply per  instructions   Rotate device position/do not position patient on device     Problem: Fall/Injury  Goal: Not fall by end of shift  Outcome:  Progressing  Goal: Be free from injury by end of the shift  Outcome: Progressing  Goal: Verbalize understanding of personal risk factors for fall in the hospital  Outcome: Progressing  Goal: Verbalize understanding of risk factor reduction measures to prevent injury from fall in the home  Outcome: Progressing  Goal: Use assistive devices by end of the shift  Outcome: Progressing  Goal: Pace activities to prevent fatigue by end of the shift  Outcome: Progressing   The patient's goals for the shift include stable vitals and labs    The clinical goals for the shift include patient will remain free from falls at this time?    Over the shift, the patient did  make progress toward the following goals. Barriers to progression include

## 2024-08-18 NOTE — PROGRESS NOTES
Physical Therapy    Physical Therapy Evaluation    Patient Name: Annetta Miguel  MRN: [id number]  Today's Date: 8/18/2024   Time Calculation  Start Time: 1235  Stop Time: 1249  Time Calculation (min): 14 min    Assessment/Plan   PT Assessment  PT Assessment Results: Decreased strength, Decreased endurance, Impaired balance, Decreased mobility, Decreased safety awareness  Rehab Prognosis: Good  Barriers to Discharge: none  Evaluation/Treatment Tolerance: Patient limited by fatigue  Medical Staff Made Aware: Yes  Strengths: Ability to acquire knowledge, Living arrangement secure, Premorbid level of function  Barriers to Participation: Comorbidities  End of Session Communication: Bedside nurse  Assessment Comment: pt is a 89 y.o. female admitted with electrolyte disturbance from several days of diarrhea and worsening kidney function. pt does demonstrate decreased tolerance to activity and min A for transfers which is below her baseline level of functional independence. pt will benefit from acute skilled therapy services to improve her functional performance and maximize safety prior to discharge home. pt will likely require a rolling walker for homegoing if mild weakness persists.  End of Session Patient Position: Up in chair, Alarm off, not on at start of session (need sin reach, RN in agreement chair alarm is not necessary at this time. pt is A&Ox4 and is using the call button appropriately.)  IP OR SWING BED PT PLAN  Inpatient or Swing Bed: Inpatient  PT Plan  Treatment/Interventions: Bed mobility, Transfer training, Gait training, Balance training, Strengthening, Endurance training, Therapeutic exercise, Therapeutic activity  PT Plan: Ongoing PT  PT Frequency: 4 times per week  PT Discharge Recommendations: Low intensity level of continued care  Equipment Recommended upon Discharge: Wheeled walker  PT Recommended Transfer Status: Assist x1  PT - OK to Discharge: Yes      Subjective   General Visit  "Information:  General  Reason for Referral: Impaired Mobility  Referred By: Karo Cowan, APRN-CNP  Past Medical History Relevant to Rehab: Osteoporosis, HLD, CRF, HTN  Family/Caregiver Present: No  Prior to Session Communication: Bedside nurse  Patient Position Received: Bed, 3 rail up, Alarm off, not on at start of session  Preferred Learning Style: verbal  General Comment: 89 y.o. female admitted with several day history of diarrhea and fatigue. pt did sustain one fall prior to admit.  Home Living:  Home Living  Type of Home: Apartment  Lives With: Alone  Home Adaptive Equipment: Cane, Reacher  Home Layout: One level  Home Access: No concerns  Bathroom Shower/Tub: Walk-in shower  Bathroom Toilet: Standard  Bathroom Equipment: None  Bathroom Accessibility: pt is having her bathroom assessed for further adaptive equipment  Home Living Comments: [facility]  Prior Level of Function:  Prior Function Per Pt/Caregiver Report  Level of [place]: Independent with ADLs and functional transfers, Independent with homemaking with ambulation  Receives Help From: Family, Other (Comment) (care staff at the facility)  ADL Assistance: Independent  Homemaking Assistance:  (pt prepares her own breakfast, goes to \"[employer]\" for light shopping, has a car but has not driven in a long time. Lunch and dinner are provided.)  Ambulatory Assistance: Independent (recently began using a cane due to feeling weak.)  Vocational: Retired  Prior Function Comments: one fall since the beginning of the year  Precautions:  Precautions  Hearing/Visual Limitations: glasses for reading, hearing grossly WFL  Medical Precautions: Fall precautions, Infection precautions (r/o C diff)  Vital Signs:  Vital Signs  Heart Rate: 99  Heart Rate Source: Monitor    Objective   Pain:  Pain Assessment  Pain Assessment: 0-10  0-10 (Numeric) Pain Score: 0 - No pain  Cognition:  Cognition  Overall Cognitive Status: Within Functional " Limits  Orientation Level: Oriented X4  Cognition Comments: pleasant and cooperative    General Assessments:  General Observation  General Observation: visible skin intact    Activity Tolerance  Endurance: Decreased tolerance for upright activites  Activity Tolerance Comments: fair  Rate of Perceived Exertion (RPE): 3    Sensation  Sensation Comment: pt denies paresthesias    Strength  Strength Comments: LE strength appears equal bilaterally based on function, 4-/5  Coordination  Coordination Comment: grossly intact    Postural Control  Posture Comment: forward head posture    Static Sitting Balance  Static Sitting-Balance Support: Feet supported, Bilateral upper extremity supported  Static Sitting-Level of Assistance: Modified independent  Static Sitting-Comment/Number of Minutes: normal balance seated in chair  Dynamic Sitting Balance  Dynamic Sitting-Balance Support: Bilateral upper extremity supported, Feet unsupported  Dynamic Sitting-Level of Assistance: Close supervision  Dynamic Sitting-Comments: good balance sitting on EOB    Static Standing Balance  Static Standing-Balance Support: Left upper extremity supported  Static Standing-Level of Assistance: Contact guard  Static Standing-Comment/Number of Minutes: good- static standing balance, forward flexed with wide LEONCIO  Dynamic Standing Balance  Dynamic Standing-Balance Support: Left upper extremity supported  Dynamic Standing-Level of Assistance: Minimum assistance  Dynamic Standing-Balance: Turning  Dynamic Standing-Comments: fair standing balance during transfer  Functional Assessments:  ADL  ADL's Addressed: No    Bed Mobility  Bed Mobility: Yes  Bed Mobility 1  Bed Mobility 1: Supine to sitting  Level of Assistance 1: Contact guard  Bed Mobility Comments 1: pt able to move B feet off EOB, CGA for trunk support when completing transfer with HOB elevated and use of bed rail.    Transfers  Transfer: Yes  Transfer 1  Transfer From 1: Bed to  Transfer to 1:  Stand  Technique 1: Sit to stand  Transfer Level of Assistance 1: Minimum assistance  Trials/Comments 1: min A to guide trunk up and establish COG over LEONCIO, verbal cues for posture  Transfers 2  Transfer From 2: Stand to  Transfer to 2: Chair with arms  Technique 2: Stand to sit  Transfer Level of Assistance 2: Minimum assistance  Trials/Comments 2: verbal cues to reach hands back for arms of chair, min A for eccentric control in sitting. pt able to reposition self in chair.    Ambulation/Gait Training  Ambulation/Gait Training Performed:  (4 short, shuffled steps from EOB to chair, min A for balance and safety.)  Extremity/Trunk Assessments:  RLE   RLE : Within Functional Limits  LLE   LLE : Within Functional Limits  Outcome Measures:  [facility] Basic Mobility  Turning from your back to your side while in a flat bed without using bedrails: A little  Moving from lying on your back to sitting on the side of a flat bed without using bedrails: A little  Moving to and from bed to chair (including a wheelchair): A little  Standing up from a chair using your arms (e.g. wheelchair or bedside chair): A little  To walk in hospital room: A little  Climbing 3-5 steps with railing: A little  Basic Mobility - Total Score: 18    Encounter Problems       Encounter Problems (Active)       Mobility       STG - Patient will ambulate 100' with rolling walker and distant supervision (Progressing)       Start:  08/18/24    Expected End:  09/15/24               PT Transfers       STG - Transfer from bed to chair distant supervision (Progressing)       Start:  08/18/24    Expected End:  09/15/24            STG - Patient to transfer to and from sit to supine distant supervision (Progressing)       Start:  08/18/24    Expected End:  09/15/24            STG - Patient will transfer sit to and from stand distant supervision. (Progressing)       Start:  08/18/24    Expected End:  09/15/24                   Education Documentation  Mobility  Training, taught by Dayna Garner, PT at 8/18/2024  2:36 PM.  Learner: Patient  Readiness: Acceptance  Method: Explanation  Response: Verbalizes Understanding    Education Comments  No comments found.

## 2024-08-18 NOTE — CONSULTS
Inpatient consult to Cardiology  Consult performed by: CLIFF Banerjee  Consult ordered by: CLIFF Bedolla  Reason for consult: Elevated troponins        History Of Present Illness:    Annetta Miguel is a 89 y.o. female presenting with diarrhea.  The patient does not currently follow with a cardiologist.  She is past medical history of chronic kidney disease stage IV and hypertension.  Patient reports symptoms beginning approximately 3 to 4 days ago including significant diarrhea and fatigue.  Patient denies any chest pain, pressure or palpitations.  Denies nausea and vomiting.  Patient also reports that yesterday morning she was ambulating to the bathroom at her assisted living facility when she lost her balance and fell.  She did not hit her head or lose consciousness.  Denies any lightheadedness or dizziness.  The patient presented to [facility] emergency department which time an EKG was completed revealing normal sinus rhythm with a right bundle branch block but no evidence of acute ischemia.  Chest x-ray with no evidence of acute cardiopulmonary process.  CT of the abdomen pelvis revealed multiple fluid-filled and slightly dilated loops of the jejunum, nondilated loops of ileum which may be due to a focal ileus or small bowel obstruction.  Lab work was significant for a sodium of 126, potassium 3.5, creatinine 4.40, magnesium 1.10 and hemoglobin of 7.9.  White blood cell count elevated at 23.1.  Troponin levels trended at 176, 157 and 135.  proBNP was elevated 19,580.  COVID-19 testing was negative.  The patient was given a liter fluid bolus, potassium, magnesium, calcium gluconate and Rocephin in the emergency department was admitted to the hospital on telemetry for further assessment and management.  Cardiology was consulted due to the patient's elevated troponin levels.    Review of Systems   Constitutional: Positive for malaise/fatigue.   Cardiovascular:  Negative for chest pain,  dyspnea on exertion, palpitations and syncope.   Gastrointestinal:  Positive for diarrhea.   All other systems reviewed and are negative.         Last Recorded Vitals:  Vitals:    08/17/24 2000 08/18/24 0000 08/18/24 0400 08/18/24 0812   BP: 108/50 110/53 (!) 112/49 134/50   BP Location: Left arm Left arm Left arm Right arm   Patient Position: Lying Lying Lying Lying   Pulse: 85 100 99 95   Resp: 17 18 17    Temp: 36.7 °C (98.1 °F) 36.6 °C (97.9 °F) 36.6 °C (97.9 °F) 36.3 °C (97.3 °F)   TempSrc: Temporal Temporal Temporal Temporal   SpO2: 99% 99% 97% 98%   Weight:       Height:           Last Labs:  CBC - 8/18/2024:  4:25 AM  16.4 8.1 126    23.7      CMP - 8/18/2024:  4:25 AM  6.4 5.1 27 --- 0.4   3.3 2.7 14 53      PTT - No results in last year.  _   _ _     POC HEMOGLOBIN A1c   Date/Time Value Ref Range Status   10/23/2023 11:07 AM 6.2 4.2 - 6.5 % Final     Hemoglobin A1C   Date/Time Value Ref Range Status   08/17/2024 04:29 PM 5.7 (H) See below % Final   10/12/2022 10:05 AM 5.6 4.0 - 6.0 % Final     Comment:     Hemoglobin A1C levels are related to mean blood glucose during the   preceding 2-3 months. The relationship table below may be used as a   general guide. Each 1% increase in HGB A1C is a reflection of an   increase in mean glucose of approximately 30 mg/dl.   Reference: Diabetes Care, volume 29, supplement 1 Jan. 2006                        HGB A1C ................. Approx. Mean Glucose   _______________________________________________   6%   ...............................  120 mg/dl   7%   ...............................  150 mg/dl   8%   ...............................  180 mg/dl   9%   ...............................  210 mg/dl   10%  ...............................  240 mg/dl  Performed at [address] [address]     10/06/2021 10:44 AM 6.0 4.0 - 6.0 % Final     Comment:     Hemoglobin A1C levels are related to mean blood glucose during the   preceding 2-3 months. The  "relationship table below may be used as a   general guide. Each 1% increase in HGB A1C is a reflection of an   increase in mean glucose of approximately 30 mg/dl.   Reference: Diabetes Care, volume 29, supplement 1 Jan. 2006                        HGB A1C ................. Approx. Mean Glucose   _______________________________________________   6%   ...............................  120 mg/dl   7%   ...............................  150 mg/dl   8%   ...............................  180 mg/dl   9%   ...............................  210 mg/dl   10%  ...............................  240 mg/dl  Performed at [address] [address]       LDL Calculated   Date/Time Value Ref Range Status   10/12/2022 10:05 AM 53 (L) 65 - 130 MG/DL Final   10/06/2021 10:44 AM 49 (L) 65 - 130 MG/DL Final   10/15/2020 11:10 AM 81 65 - 130 MG/DL Final      Last I/O:  I/O last 3 completed shifts:  In: 2299.5 (36.2 mL/kg) [I.V.:1200 (18.9 mL/kg); IV Piggyback:1099.5]  Out: 600 (9.4 mL/kg) [Urine:600 (0.3 mL/kg/hr)]  Weight: 63.5 kg     Past Cardiology Tests (Last 3 Years):  EKG:  No results found for this or any previous visit from the past 1095 days.    Echo:  No results found for this or any previous visit from the past 1095 days.    Ejection Fractions:  No results found for: \"EF\"  Cath:  No results found for this or any previous visit from the past 1095 days.    Stress Test:  No results found for this or any previous visit from the past 1095 days.    Cardiac Imaging:  No results found for this or any previous visit from the past 1095 days.      Past Medical History:  She has a past medical history of Cervical stenosis of spine, CKD (chronic kidney disease), HLD (hyperlipidemia), Hypertension, Insomnia, and Osteoporosis.    Past Surgical History:  She has no past surgical history on file.      Social History:  She reports that she has never smoked. She has never been exposed to tobacco smoke. She has never used smokeless " tobacco. She reports that she does not currently use alcohol. She reports that she does not use drugs.    Family History:  Family History   Problem Relation Name Age of Onset    Stroke Mother      Other (Heart Condition) Father      Diabetes Father      Heart disease Father      Other (Intestinal Complications) Sister      Parkinsonism Brother          Allergies:  Oxycodone-acetaminophen, Other, and Oxycodone    Inpatient Medications:  Scheduled medications   Medication Dose Route Frequency    atorvastatin  10 mg oral Nightly    calcitriol  0.25 mcg oral Daily    cefTRIAXone  1 g intravenous q24h    ferrous sulfate (325 mg ferrous sulfate)  65 mg of iron oral Daily with breakfast    heparin  5,000 Units subcutaneous q12h NOÉ    pantoprazole  40 mg oral Daily before breakfast    Or    pantoprazole  40 mg intravenous Daily before breakfast    perflutren lipid microspheres  0.5-10 mL of dilution intravenous Once in imaging    perflutren protein A microsphere  0.5 mL intravenous Once in imaging    sevelamer carbonate  800 mg oral TID    sulfur hexafluoride microsphr  2 mL intravenous Once in imaging     PRN medications   Medication    ondansetron     Continuous Medications   Medication Dose Last Rate    sodium bicarbonate  75 mL/hr       Outpatient Medications:  Current Outpatient Medications   Medication Instructions    amLODIPine (NORVASC) 10 mg, oral, Daily    atorvastatin (LIPITOR) 10 mg, oral, Daily    calcitriol (ROCALTROL) 0.25 mcg, oral, Every other day    cycloSPORINE (Restasis MultiDose) 0.05 % drops 1 drop, ophthalmic (eye), 2 times daily    furosemide (Lasix) 80 mg tablet 1 tablet, oral, Daily    losartan (Cozaar) 50 mg tablet 1 tablet, oral, Daily    polyethylene glycol (Glycolax, Miralax) 17 gram/dose powder oral, Daily PRN,  FOR CONSTIPATION<BR>    polyvinyl alcohol (Liquifilm Tears) 1.4 % ophthalmic solution Ophthalmic    sevelamer carbonate (Renvela) 800 mg tablet 1 tablet, oral, 3 times daily (morning,  midday, late afternoon)    sodium bicarbonate 650 mg tablet 1 tablet, oral, 4 times daily     Physical Exam  Constitutional:       Appearance: She is ill-appearing.   Cardiovascular:      Rate and Rhythm: Regular rhythm. Tachycardia present.      Heart sounds: No murmur heard.     No friction rub. No gallop.   Pulmonary:      Effort: Pulmonary effort is normal.      Breath sounds: Normal breath sounds. No wheezing, rhonchi or rales.   Abdominal:      General: Bowel sounds are normal.   Musculoskeletal:      Comments: Trace bilateral lower extremity pitting edema   Skin:     General: Skin is warm and dry.      Capillary Refill: Capillary refill takes less than 2 seconds.   Neurological:      Mental Status: She is alert and oriented to person, place, and time.   Psychiatric:         Mood and Affect: Mood normal.         Behavior: Behavior normal.            Assessment/Plan   Elevated Troponin Levels  Rule out partial small bowel obstruction  Dehydration  Mechanical fall  Acute kidney injury on chronic kidney disease stage IV  Hypertension  Hyperlipidemia    Impression and plan    8/18: As described above.  Patient presenting from home with diarrhea for several days.  She denies any anginal type symptoms.  Denies shortness of breath.  Denies nausea or vomiting.  EKG in the emergency department was nonischemic.  Chest x-ray negative for acute cardiopulmonary process.  CT of the abdomen with evidence of a possible small bowel obstruction.  Surgical services have been consulted.  Troponin levels trended at 176, 157, 135.  Patient denies any cardiac history. On my exam the patient is alert and oriented.  She remains in sinus rhythm below sinus tachycardia on telemetry without significant ectopy.  Blood pressures are improved since yesterday with last recorded 134/50.  Patient breathing comfortably on room air with pulse oximetry of 98%.  I do not believe she appears particularly fluid overloaded.  Lab this morning revealed  sodium 129, potassium 4.0, creatinine 4.10 and hemoglobin of 8.1.  She is an elevated white blood cell count this morning of 16.4 which is downtrending from admission.  Dr. Littlejohn with nephrology has been consulted as he is the patient's primary nephrologist.  As the patient is not describing any anginal type symptoms, and had a nonischemic EKG in the emergency department I have a low suspicion for acute coronary syndrome at this time.  Troponins are downtrending and believe they were likely elevated due to the patient's acute kidney injury superimposed on chronic kidney disease stage IV.  Agree with obtaining echocardiogram which will be completed tomorrow but otherwise no recommendations from the cardiac perspective.    Peripheral IV 08/17/24 20 G Left Antecubital (Active)   Site Assessment Clean;Dry;Intact 08/18/24 0900   Dressing Status Occlusive;Clean;Dry 08/18/24 0900   Number of days: 1       Urethral Catheter (Active)   Site Assessment Clean;Skin intact 08/18/24 0608   Number of days: 1       Code Status:  DNR and No Intubation and No ICU    I spent 60 minutes in the professional and overall care of this patient.        Leyla Frederick, APRN-CNP

## 2024-08-19 ENCOUNTER — APPOINTMENT (OUTPATIENT)
Dept: CARDIOLOGY | Facility: HOSPITAL | Age: 89
DRG: 872 | End: 2024-08-19
Payer: MEDICARE

## 2024-08-19 LAB
ALBUMIN SERPL-MCNC: 2.5 G/DL (ref 3.5–5)
ALP BLD-CCNC: 53 U/L (ref 35–125)
ALT SERPL-CCNC: 13 U/L (ref 5–40)
ANION GAP SERPL CALC-SCNC: 14 MMOL/L
AORTIC VALVE MEAN GRADIENT: 5.3 MMHG
AORTIC VALVE PEAK VELOCITY: 1.53 M/S
AST SERPL-CCNC: 19 U/L (ref 5–40)
AV PEAK GRADIENT: 9.3 MMHG
AVA (PEAK VEL): 2.77 CM2
AVA (VTI): 2.42 CM2
BASOPHILS # BLD AUTO: 0.01 X10*3/UL (ref 0–0.1)
BASOPHILS NFR BLD AUTO: 0.1 %
BILIRUB SERPL-MCNC: 0.3 MG/DL (ref 0.1–1.2)
BUN SERPL-MCNC: 65 MG/DL (ref 8–25)
CALCIUM SERPL-MCNC: 5.8 MG/DL (ref 8.5–10.4)
CHLORIDE SERPL-SCNC: 95 MMOL/L (ref 97–107)
CK SERPL-CCNC: 141 U/L (ref 24–195)
CO2 SERPL-SCNC: 18 MMOL/L (ref 24–31)
CREAT SERPL-MCNC: 3.7 MG/DL (ref 0.4–1.6)
EGFRCR SERPLBLD CKD-EPI 2021: 11 ML/MIN/1.73M*2
EJECTION FRACTION APICAL 4 CHAMBER: 71.6
EJECTION FRACTION: 60 %
EOSINOPHIL # BLD AUTO: 0.01 X10*3/UL (ref 0–0.4)
EOSINOPHIL NFR BLD AUTO: 0.1 %
ERYTHROCYTE [DISTWIDTH] IN BLOOD BY AUTOMATED COUNT: 12.9 % (ref 11.5–14.5)
GLUCOSE SERPL-MCNC: 129 MG/DL (ref 65–99)
HCT VFR BLD AUTO: 20.7 % (ref 36–46)
HGB BLD-MCNC: 7.2 G/DL (ref 12–16)
IMM GRANULOCYTES # BLD AUTO: 0.13 X10*3/UL (ref 0–0.5)
IMM GRANULOCYTES NFR BLD AUTO: 1 % (ref 0–0.9)
LEFT ATRIUM VOLUME AREA LENGTH INDEX BSA: 40.2 ML/M2
LEFT VENTRICLE INTERNAL DIMENSION DIASTOLE: 4.05 CM (ref 3.5–6)
LEFT VENTRICULAR OUTFLOW TRACT DIAMETER: 2.02 CM
LV EJECTION FRACTION BIPLANE: 69 %
LYMPHOCYTES # BLD AUTO: 0.59 X10*3/UL (ref 0.8–3)
LYMPHOCYTES NFR BLD AUTO: 4.5 %
MCH RBC QN AUTO: 31.4 PG (ref 26–34)
MCHC RBC AUTO-ENTMCNC: 34.8 G/DL (ref 32–36)
MCV RBC AUTO: 90 FL (ref 80–100)
MITRAL VALVE E/A RATIO: 0.72
MITRAL VALVE E/E' RATIO: 12.76
MONOCYTES # BLD AUTO: 0.39 X10*3/UL (ref 0.05–0.8)
MONOCYTES NFR BLD AUTO: 3 %
NEUTROPHILS # BLD AUTO: 11.88 X10*3/UL (ref 1.6–5.5)
NEUTROPHILS NFR BLD AUTO: 91.3 %
NRBC BLD-RTO: 0 /100 WBCS (ref 0–0)
PHOSPHATE SERPL-MCNC: 2.6 MG/DL (ref 2.5–4.5)
PLATELET # BLD AUTO: 113 X10*3/UL (ref 150–450)
POTASSIUM SERPL-SCNC: 3.6 MMOL/L (ref 3.4–5.1)
PROT SERPL-MCNC: 4.8 G/DL (ref 5.9–7.9)
PTH-INTACT SERPL-MCNC: 785.6 PG/ML (ref 18.5–88)
RBC # BLD AUTO: 2.29 X10*6/UL (ref 4–5.2)
RIGHT VENTRICLE FREE WALL PEAK S': 18.05 CM/S
SODIUM SERPL-SCNC: 127 MMOL/L (ref 133–145)
TRICUSPID ANNULAR PLANE SYSTOLIC EXCURSION: 3 CM
WBC # BLD AUTO: 13 X10*3/UL (ref 4.4–11.3)

## 2024-08-19 PROCEDURE — 82550 ASSAY OF CK (CPK): CPT

## 2024-08-19 PROCEDURE — 97165 OT EVAL LOW COMPLEX 30 MIN: CPT | Mod: GO

## 2024-08-19 PROCEDURE — 6350000001 HC RX 635 EPOETIN >10,000 UNITS: Mod: JZ | Performed by: INTERNAL MEDICINE

## 2024-08-19 PROCEDURE — 80053 COMPREHEN METABOLIC PANEL: CPT

## 2024-08-19 PROCEDURE — 85025 COMPLETE CBC W/AUTO DIFF WBC: CPT

## 2024-08-19 PROCEDURE — 2500000004 HC RX 250 GENERAL PHARMACY W/ HCPCS (ALT 636 FOR OP/ED)

## 2024-08-19 PROCEDURE — 93306 TTE W/DOPPLER COMPLETE: CPT

## 2024-08-19 PROCEDURE — 2500000002 HC RX 250 W HCPCS SELF ADMINISTERED DRUGS (ALT 637 FOR MEDICARE OP, ALT 636 FOR OP/ED)

## 2024-08-19 PROCEDURE — 97535 SELF CARE MNGMENT TRAINING: CPT | Mod: GO

## 2024-08-19 PROCEDURE — 2060000001 HC INTERMEDIATE ICU ROOM DAILY

## 2024-08-19 PROCEDURE — 36415 COLL VENOUS BLD VENIPUNCTURE: CPT

## 2024-08-19 PROCEDURE — 84100 ASSAY OF PHOSPHORUS: CPT | Performed by: INTERNAL MEDICINE

## 2024-08-19 PROCEDURE — 2500000001 HC RX 250 WO HCPCS SELF ADMINISTERED DRUGS (ALT 637 FOR MEDICARE OP): Performed by: INTERNAL MEDICINE

## 2024-08-19 PROCEDURE — 97110 THERAPEUTIC EXERCISES: CPT | Mod: GP

## 2024-08-19 PROCEDURE — 2500000004 HC RX 250 GENERAL PHARMACY W/ HCPCS (ALT 636 FOR OP/ED): Performed by: INTERNAL MEDICINE

## 2024-08-19 PROCEDURE — 93306 TTE W/DOPPLER COMPLETE: CPT | Performed by: INTERNAL MEDICINE

## 2024-08-19 PROCEDURE — 97116 GAIT TRAINING THERAPY: CPT | Mod: GP

## 2024-08-19 PROCEDURE — 2500000001 HC RX 250 WO HCPCS SELF ADMINISTERED DRUGS (ALT 637 FOR MEDICARE OP)

## 2024-08-19 RX ORDER — CALCITRIOL 0.25 UG/1
0.5 CAPSULE ORAL DAILY
Status: DISCONTINUED | OUTPATIENT
Start: 2024-08-20 | End: 2024-08-20 | Stop reason: HOSPADM

## 2024-08-19 RX ORDER — SODIUM BICARBONATE 650 MG/1
650 TABLET ORAL 3 TIMES DAILY
Status: DISCONTINUED | OUTPATIENT
Start: 2024-08-19 | End: 2024-08-20 | Stop reason: HOSPADM

## 2024-08-19 RX ORDER — CALCIUM GLUCONATE 20 MG/ML
1 INJECTION, SOLUTION INTRAVENOUS ONCE
Status: COMPLETED | OUTPATIENT
Start: 2024-08-19 | End: 2024-08-19

## 2024-08-19 RX ORDER — ERGOCALCIFEROL 1.25 MG/1
1250 CAPSULE ORAL
Status: DISCONTINUED | OUTPATIENT
Start: 2024-08-19 | End: 2024-08-20 | Stop reason: HOSPADM

## 2024-08-19 RX ADMIN — Medication 5 MG: at 21:18

## 2024-08-19 RX ADMIN — SODIUM BICARBONATE 650 MG TABLET 650 MG: at 21:18

## 2024-08-19 RX ADMIN — FERROUS SULFATE TAB 325 MG (65 MG ELEMENTAL FE) 1 TABLET: 325 (65 FE) TAB at 08:13

## 2024-08-19 RX ADMIN — SEVELAMER CARBONATE 800 MG: 800 TABLET, FILM COATED ORAL at 18:14

## 2024-08-19 RX ADMIN — EPOETIN ALFA-EPBX 10000 UNITS: 10000 INJECTION, SOLUTION INTRAVENOUS; SUBCUTANEOUS at 10:06

## 2024-08-19 RX ADMIN — ERGOCALCIFEROL 1250 MCG: 1.25 CAPSULE ORAL at 13:05

## 2024-08-19 RX ADMIN — SODIUM BICARBONATE 650 MG TABLET 650 MG: at 15:41

## 2024-08-19 RX ADMIN — CEFTRIAXONE SODIUM 1 G: 1 INJECTION, SOLUTION INTRAVENOUS at 10:06

## 2024-08-19 RX ADMIN — CALCITRIOL CAPSULES 0.25 MCG 0.25 MCG: 0.25 CAPSULE ORAL at 08:13

## 2024-08-19 RX ADMIN — SEVELAMER CARBONATE 800 MG: 800 TABLET, FILM COATED ORAL at 08:13

## 2024-08-19 RX ADMIN — PANTOPRAZOLE SODIUM 40 MG: 40 TABLET, DELAYED RELEASE ORAL at 06:12

## 2024-08-19 RX ADMIN — HEPARIN SODIUM 5000 UNITS: 5000 INJECTION, SOLUTION INTRAVENOUS; SUBCUTANEOUS at 21:18

## 2024-08-19 RX ADMIN — ATORVASTATIN CALCIUM 10 MG: 10 TABLET, FILM COATED ORAL at 21:18

## 2024-08-19 RX ADMIN — CALCIUM GLUCONATE 1 G: 20 INJECTION, SOLUTION INTRAVENOUS at 16:10

## 2024-08-19 RX ADMIN — SODIUM BICARBONATE 75 ML/HR: 84 INJECTION, SOLUTION INTRAVENOUS at 00:32

## 2024-08-19 RX ADMIN — SEVELAMER CARBONATE 800 MG: 800 TABLET, FILM COATED ORAL at 13:05

## 2024-08-19 RX ADMIN — HEPARIN SODIUM 5000 UNITS: 5000 INJECTION, SOLUTION INTRAVENOUS; SUBCUTANEOUS at 08:13

## 2024-08-19 ASSESSMENT — PAIN - FUNCTIONAL ASSESSMENT
PAIN_FUNCTIONAL_ASSESSMENT: 0-10

## 2024-08-19 ASSESSMENT — COGNITIVE AND FUNCTIONAL STATUS - GENERAL
WALKING IN HOSPITAL ROOM: A LITTLE
MOVING FROM LYING ON BACK TO SITTING ON SIDE OF FLAT BED WITH BEDRAILS: A LITTLE
PERSONAL GROOMING: A LITTLE
STANDING UP FROM CHAIR USING ARMS: A LITTLE
TURNING FROM BACK TO SIDE WHILE IN FLAT BAD: A LITTLE
MOVING TO AND FROM BED TO CHAIR: A LITTLE
CLIMB 3 TO 5 STEPS WITH RAILING: A LOT
HELP NEEDED FOR BATHING: A LITTLE
MOBILITY SCORE: 17
DRESSING REGULAR LOWER BODY CLOTHING: A LITTLE
DAILY ACTIVITIY SCORE: 21
HELP NEEDED FOR BATHING: A LITTLE
HELP NEEDED FOR BATHING: A LITTLE
DRESSING REGULAR UPPER BODY CLOTHING: A LITTLE
STANDING UP FROM CHAIR USING ARMS: A LITTLE
TURNING FROM BACK TO SIDE WHILE IN FLAT BAD: A LITTLE
TURNING FROM BACK TO SIDE WHILE IN FLAT BAD: A LITTLE
MOVING FROM LYING ON BACK TO SITTING ON SIDE OF FLAT BED WITH BEDRAILS: A LITTLE
EATING MEALS: A LITTLE
MOVING FROM LYING ON BACK TO SITTING ON SIDE OF FLAT BED WITH BEDRAILS: A LITTLE
TOILETING: A LITTLE
CLIMB 3 TO 5 STEPS WITH RAILING: A LOT
DAILY ACTIVITIY SCORE: 21
DRESSING REGULAR LOWER BODY CLOTHING: A LOT
MOVING TO AND FROM BED TO CHAIR: A LITTLE
DAILY ACTIVITIY SCORE: 18
DRESSING REGULAR LOWER BODY CLOTHING: A LOT
WALKING IN HOSPITAL ROOM: A LITTLE
MOBILITY SCORE: 17

## 2024-08-19 ASSESSMENT — PAIN SCALES - GENERAL
PAINLEVEL_OUTOF10: 0 - NO PAIN

## 2024-08-19 ASSESSMENT — ACTIVITIES OF DAILY LIVING (ADL)
BATHING_WHERE_ASSESSED: EDGE OF BED
BATHING_LEVEL_OF_ASSISTANCE: MODERATE ASSISTANCE
BATHING_ASSISTANCE: NOT PERFORMED
ADL_ASSISTANCE: INDEPENDENT
HOME_MANAGEMENT_TIME_ENTRY: 8

## 2024-08-19 NOTE — PROGRESS NOTES
08/19/24 1140   Discharge Planning   Who is requesting discharge planning? Provider   Home or Post Acute Services In home services  (Pt is a resident at [facility]-they  have their own HHC, need external order)   Type of Home Care Services Home nursing visits;Home OT;Home PT   Expected Discharge Disposition  Services   Does the patient need discharge transport arranged? Yes   RoundTrip coordination needed? Yes   Has discharge transport been arranged? No   Patient Choice   Patient / Family choosing to utilize agency / facility established prior to hospitalization Yes     Per CNP pt diet is advancing. If she tolerates it she will return to [facility] with HHC via [facility]. Need external order.   ADOD 8/20    Do not send pt without speaking to [facility]

## 2024-08-19 NOTE — NURSING NOTE
Assumed care of patient. Patient A/O at baseline. NSR on the monitor. Bed in low position with alarm on. Call bell in reach. Care ongoing.

## 2024-08-19 NOTE — CONSULTS
"Nutrition Assessement Note    Nutrition Assessment    Reason for Assessment: Admission nursing screening (MST 2)    Reason for Hospital Admission:  Annetta Miguel is a 89 y.o. female who is admitted for diarrhea, JET. Hx of stage 5 CKD, declining dialysis. Admitted with diarrhea, loss of appetite. General Surgery advanced diet today. Patient agreeable to Mighty Shakes TID.    Past Medical History:   Diagnosis Date    Cervical stenosis of spine     CKD (chronic kidney disease)     HLD (hyperlipidemia)     Hypertension     Insomnia     Osteoporosis       History reviewed. No pertinent surgical history.    Nutrition History:  Food and Nutrient History: patient repors low appetite for a few weeks  Energy Intake: Poor < 50 %  Food Allergies/Intolerances:  None  GI Symptoms: None  Oral Problems: None    Anthropometrics:  Ht: 157.5 cm (5' 2.01\"), Wt: 65.8 kg (145 lb), BMI: 26.51  IBW/kg (Dietitian Calculated): 50 kg  Percent of IBW: 132 %     Weight Change:  Daily Weight  08/19/24 : 65.8 kg (145 lb)  08/12/24 : 60.4 kg (133 lb 3.2 oz)  06/14/24 : 62.8 kg (138 lb 6.4 oz)  05/22/24 : 63.2 kg (139 lb 6.4 oz)  04/22/24 : 63.5 kg (140 lb)  10/23/23 : 66 kg (145 lb 6.4 oz)  04/18/23 : 65 kg (143 lb 3.2 oz)  10/19/22 : 68.5 kg (151 lb)  04/20/22 : 67.1 kg (148 lb)  10/06/21 : 67.5 kg (148 lb 12.8 oz)     Weight History / % Weight Change: Patient reports stable weight  Significant Weight Loss: No     Nutrition Focused Physical Exam Findings:   Subcutaneous Fat Loss  Orbital Fat Pads: Well nourished (slightly bulging fat pads)  Buccal Fat Pads: Well nourished (full, rounded cheeks)  Triceps: Well nourished (ample fat tissue)    Muscle Wasting  Temporalis: Well nourished (well-defined muscle)  Pectoralis (Clavicular Region): Well nourished (clavicle not visible)  Deltoid/Trapezius: Well nourished (rounded appearance at arm, shoulder, neck)  Interosseous: Well nourished (muscle bulges)    Nutrition Significant Labs:  Lab " Results   Component Value Date    WBC 13.0 (H) 08/19/2024    HGB 7.2 (L) 08/19/2024    HCT 20.7 (L) 08/19/2024     (L) 08/19/2024    CHOL 117 (L) 10/12/2022    TRIG 128 10/12/2022    HDL 38 (L) 10/12/2022    ALT 13 08/19/2024    AST 19 08/19/2024     (L) 08/19/2024    K 3.6 08/19/2024    CL 95 (L) 08/19/2024    CREATININE 3.70 (H) 08/19/2024    BUN 65 (H) 08/19/2024    CO2 18 (L) 08/19/2024    TSH 1.29 11/05/2018    INR 0.9 08/09/2018    HGBA1C 5.7 (H) 08/17/2024    ALBUR 209 (H) 05/25/2018     Nutrition Specific Medications:  atorvastatin, 10 mg, oral, Nightly  [START ON 8/20/2024] calcitriol, 0.5 mcg, oral, Daily  calcium gluconate, 1 g, intravenous, Once  cefTRIAXone, 1 g, intravenous, q24h  epoetin sabrina or biosimilar, 10,000 Units, subcutaneous, Once per day on Monday Wednesday Friday  ergocalciferol, 1,250 mcg, oral, Every Sunday  ferrous sulfate (325 mg ferrous sulfate), 65 mg of iron, oral, Daily with breakfast  heparin, 5,000 Units, subcutaneous, q12h NOÉ  melatonin, 5 mg, oral, Nightly  pantoprazole, 40 mg, oral, Daily before breakfast   Or  pantoprazole, 40 mg, intravenous, Daily before breakfast  perflutren lipid microspheres, 0.5-10 mL of dilution, intravenous, Once in imaging  perflutren protein A microsphere, 0.5 mL, intravenous, Once in imaging  sevelamer carbonate, 800 mg, oral, TID  sodium bicarbonate, 650 mg, oral, TID  sulfur hexafluoride microsphr, 2 mL, intravenous, Once in imaging         Dietary Orders (From admission, onward)       Start     Ordered    08/19/24 1355  Oral nutritional supplements  Until discontinued        Comments: chocolate   Question Answer Comment   Deliver with All meals    Select supplement: Sugar Free Mighty Shake        08/19/24 1355    08/19/24 1123  Adult diet Regular  Diet effective now        Question:  Diet type  Answer:  Regular    08/19/24 1123                   Estimated Needs:   Estimated Energy Needs  Total Energy Estimated Needs (kCal): 1650  kCal  Total Estimated Energy Need per Day (kCal/kg): 25 kCal/kg  Method for Estimating Needs: Actual Wt    Estimated Protein Needs  Total Protein Estimated Needs (g): 66 g  Total Protein Estimated Needs (g/kg): 1 g/kg  Method for Estimating Needs: Actual Wt    Estimated Fluid Needs  Total Fluid Estimated Needs (mL): 1650 mL  Method for Estimating Needs: 1 mL/kcal      Nutrition Diagnosis   Nutrition Diagnosis:     Nutrition Diagnosis  Patient has Nutrition Diagnosis: Yes  Diagnosis Status (1): New  Nutrition Diagnosis 1: Inadequate energy intake  Related to (1): low appetite     Nutrition Interventions/Recommendations   Nutrition Interventions and Recommendations:    Nutrition Prescription:  Individualized Nutrition Prescription Provided for : 1650 calories, 66 gm protein to be provided via diet/nutritional supplements    Nutrition Interventions:   Food and/or Nutrient Delivery Interventions  Interventions: Meals and snacks, Medical food supplement  Meals and Snacks: General healthful diet  Goal: provide as ordered  Medical Food Supplement: Modified beverage  Goal: mighty shake TID to provide 200 kcals and 7g protein each    Education Documentation  No documentation found.         Nutrition Monitoring and Evaluation   Monitoring/Evaluation:   Food/Nutrient Related History Monitoring  Monitoring and Evaluation Plan: Energy intake  Energy Intake: Estimated energy intake  Criteria: pt to consume >/= 75% estimated needs       Time Spent/Follow-up:   Follow Up  Time Spent (min): 30 minutes  Last Date of Nutrition Visit: 08/19/24  Nutrition Follow-Up Needed?: 5-7 days  Follow up Comment: 8/23/24

## 2024-08-19 NOTE — CARE PLAN
Problem: Mobility  Goal: STG - Patient will ambulate 100' with rolling walker and distant supervision  Outcome: Progressing     Problem: PT Transfers  Goal: STG - Patient will transfer sit to and from stand distant supervision.  Outcome: Progressing

## 2024-08-19 NOTE — PROGRESS NOTES
Occupational Therapy    Evaluation/Treatment    Patient Name: Annetta Miguel  MRN: [id number]  : 1930  Today's Date: 24  Time Calculation  Start Time: 1141  Stop Time: 1204  Time Calculation (min): 23 min       Assessment:  OT Assessment: Pt demonstrates to be functioning close to baseline independence. OT to address LB ADL training and AE training. Recommend d/c to Low Intensity level.  Prognosis: Excellent  Evaluation/Treatment Tolerance: Patient tolerated treatment well  Medical Staff Made Aware: Yes  End of Session Communication: Bedside nurse (Via SecureChat)  End of Session Patient Position: Up in chair (Alarm not on, RN aware, reviewed safe use of call bell. Call bell in reach)  OT Assessment Results: Decreased ADL status  Prognosis: Excellent  Evaluation/Treatment Tolerance: Patient tolerated treatment well  Medical Staff Made Aware: Yes  Strengths: Ability to acquire knowledge, Attitude of self, Housing layout  Plan:  Treatment Interventions: ADL retraining, Patient/family training  OT Frequency: 3 times per week  OT Discharge Recommendations: Low intensity level of continued care  Equipment Recommended upon Discharge:  (Sock aide, reacher, shoe horn)  OT Recommended Transfer Status: Assist of 1  OT - OK to Discharge: Yes  Treatment Interventions: ADL retraining, Patient/family training    Subjective   Current Problem:  1. Diarrhea        2. JET (acute kidney injury) (CMS-HCC)        3. Hypomagnesemia        4. Hypocalcemia        5. Acute UTI (urinary tract infection)        6. Chronic renal failure, stage 4 (severe) (Multi)  Transthoracic Echo (TTE) Complete    Transthoracic Echo (TTE) Complete    Referral to Home Health      7. Hypotension due to hypovolemia  Transthoracic Echo (TTE) Complete    Transthoracic Echo (TTE) Complete      8. Other specified abnormal findings of blood chemistry  Transthoracic Echo (TTE) Complete        General:   OT Received On: 24  General  Reason  for Referral: 89yo F PMH CKD IV, cervical stenosis, HLD, osteoporosis, insomnia, HTN presenting with diarrhea and fall. C/f gastroenteritis.  Referred By: CLIFF Bedolla  Past Medical History Relevant to Rehab: CKD IV, cervical stenosis, HLD, osteoporosis, insomnia, HTN  Family/Caregiver Present: Yes  Caregiver Feedback: Dtr at bedside  Prior to Session Communication: Bedside nurse  Patient Position Received: Bed, 2 rail up  General Comment: Pt cleared and agreeable to participate in OT. (+)tele., PIV  Precautions:  Medical Precautions: Fall precautions  Vital Signs:     Pain:  Pain Assessment  Pain Assessment: 0-10  0-10 (Numeric) Pain Score: 0 - No pain    Objective   Cognition:  Overall Cognitive Status: Within Functional Limits  Orientation Level: Oriented X4           Home Living:  Type of Home: Apartment (ILF)  Lives With: Alone  Home Adaptive Equipment: Cane, Reacher  Home Layout: One level  Home Access: Elevator, Level entry  Bathroom Shower/Tub: Walk-in tub (Plans to put in a 0 threshold shower with built-in shower chair and grab bars)  Bathroom Toilet: Standard  Bathroom Equipment: None  Prior Function:  Level of [place]: Independent with ADLs and functional transfers, Independent with homemaking with ambulation  Receives Help From: Other (Comment), Family ([facility] providers)  ADL Assistance: Independent  Ambulatory Assistance: Independent (Recently began using cane)  Vocational: Retired  Hand Dominance: Right  Prior Function Comments: Pt lives in ILF alone. 2 meals provided per day. Independent with cane. Pt reports 1 fall in six mo.  IADL History:     ADL:  Grooming Assistance: Independent  Grooming Deficit: Setup  Bathing Assistance: Not performed  UE Dressing Assistance: Not performed  LE Dressing Assistance: Moderate  LE Dressing Deficit: Don/doff R sock, Don/doff L sock  Toileting Assistance with Device: Not performed  Activities of Daily Living: Grooming  Grooming Level of Assistance:  Independent  Grooming Where Assessed: Chair    LE Bathing  LE Bathing Level of Assistance: Moderate assistance  LE Bathing Where Assessed: Edge of bed  LE Bathing Comments: Pt with difficulty donning socks. pt reports not wearing socks often. OT concerns about pants/underwear/shoes.    Bed Mobility/Transfers: Bed Mobility  Bed Mobility: Yes  Bed Mobility 1  Bed Mobility 1: Supine to sitting  Level of Assistance 1: Close supervision    Transfers  Transfer: Yes  Transfer 1  Technique 1: Sit to stand, Stand to sit  Transfer Device 1: Walker  Transfer Level of Assistance 1: Contact guard, Close supervision  Trials/Comments 1: STS and EOB>CHAIR SBA    Sitting Balance:  Static Sitting Balance  Static Sitting-Balance Support: No upper extremity supported, Feet unsupported  Static Sitting-Level of Assistance: Independent  Dynamic Sitting Balance  Dynamic Sitting-Balance Support: Feet unsupported, No upper extremity supported  Dynamic Sitting-Level of Assistance: Independent  Standing Balance:  Static Standing Balance  Static Standing-Balance Support: Bilateral upper extremity supported  Static Standing-Level of Assistance: Close supervision  Dynamic Standing Balance  Dynamic Standing-Balance Support: Bilateral upper extremity supported  Dynamic Standing-Level of Assistance: Close supervision    Vision:Vision - Basic Assessment  Current Vision: No visual deficits  Sensation:  Sensation Comment: No reported numbness/tingling    Coordination:  Movements are Fluid and Coordinated: Yes     Hand Function:  Hand Function  Gross Grasp: Functional  Coordination: Functional    Extremities: RUE   RUE : Within Functional Limits and LUE   LUE: Within Functional Limits      Outcome Measures: [facility] Daily Activity  Putting on and taking off regular lower body clothing: A lot  Bathing (including washing, rinsing, drying): A little  Putting on and taking off regular upper body clothing: None  Toileting, which includes using toilet, bedpan or  urinal: None  Taking care of personal grooming such as brushing teeth: None  Eating Meals: None  Daily Activity - Total Score: 21        Education Documentation  ADL Training, taught by Leslie George OT at 8/19/2024 12:48 PM.  Learner: Patient  Readiness: Acceptance  Method: Explanation  Response: Verbalizes Understanding    Education Comments  No comments found.      Goals:  Encounter Problems       Encounter Problems (Active)       Dressings Lower Extremities       LTG - Patient will utilize adaptive techniques/equipment to dress lower body Mod I       Start:  08/19/24    Expected End:  09/02/24               Functional Mobility       LTG - Patient will demonstrate simulated simple IADL Mod I       Start:  08/19/24    Expected End:  09/02/24               Toileting       LTG - Patient will complete daily toileting tasks Mod I        Start:  08/19/24    Expected End:  09/02/24

## 2024-08-19 NOTE — CARE PLAN
The patient's goals for the shift include stable vitals and labs    The clinical goals for the shift include Maintain safety

## 2024-08-19 NOTE — PROGRESS NOTES
Physical Therapy    Physical Therapy Treatment    Patient Name: Annetta Miguel  MRN: [id number]  Today's Date: 8/19/2024  Time Calculation  Start Time: 1308  Stop Time: 1333  Time Calculation (min): 25 min    Assessment/Plan   PT Assessment  Rehab Prognosis: Good  Evaluation/Treatment Tolerance: Patient limited by fatigue  End of Session Communication: Bedside nurse  End of Session Patient Position: Up in chair, Alarm off, not on at start of session  PT Plan  Inpatient/Swing Bed or Outpatient: Inpatient  PT Plan  Treatment/Interventions: Bed mobility, Transfer training, Gait training, Balance training, Strengthening, Endurance training, Therapeutic exercise, Therapeutic activity  PT Plan: Ongoing PT  PT Frequency: 4 times per week  PT Discharge Recommendations: Low intensity level of continued care  Equipment Recommended upon Discharge: Wheeled walker  PT Recommended Transfer Status: Assist x1  PT - OK to Discharge: Yes (with skilled physical therapy services at next level of care)      General Visit Information:   PT  Visit  PT Received On: 08/19/24  General  Prior to Session Communication: Bedside nurse  Patient Position Received: Up in chair, Alarm off, not on at start of session  General Comment: RN cleared patient for treatment.  Patient reports agreeable treatment.    Subjective   Precautions:  Precautions  Medical Precautions: Fall precautions         Objective   Pain:  Pain Assessment  Pain Assessment: 0-10  0-10 (Numeric) Pain Score: 0 - No pain  Cognition:  Cognition  Overall Cognitive Status: Within Functional Limits  Coordination:  Movements are Fluid and Coordinated: No  Lower Body Coordination: slower rate of movement [place] LE  Postural Control:  Static Sitting Balance  Static Sitting-Balance Support: No upper extremity supported  Static Sitting-Level of Assistance: Independent  Static Standing Balance  Static Standing-Balance Support: Bilateral upper extremity supported  Static Standing-Level of  Assistance: Minimum assistance  Static Standing-Comment/Number of Minutes: Assist with trunk support and balance during static standing with rolling walker       Activity Tolerance:  Activity Tolerance  Endurance: Decreased tolerance for upright activites  Activity Tolerance Comments: Fatigue  Treatments:  Therapeutic Exercise  Therapeutic Exercise Performed: Yes  Therapeutic Exercise Activity 1: Ankle pumps, sitting knee extension, sitting hip flexion, sitting isometric hip adduction with pillow between knees 10 reps x 1 set              Bed Mobility  Bed Mobility: No       Transfers  Transfer: Yes  Transfer 1  Transfer From 1: Sit to  Transfer to 1: Stand  Technique 1: Sit to stand  Transfer Device 1: Walker  Transfer Level of Assistance 1: Minimum assistance  Trials/Comments 1: Assist with trunk support and balance;  verbla cues for hand placement  Transfers 2  Transfer From 2: Stand to  Transfer to 2: Sit  Technique 2: Stand to sit  Transfer Device 2: Walker  Transfer Level of Assistance 2: Minimum assistance  Trials/Comments 2: Assist with trunk support and balance;  verbal cues for hand placement;  decreased eccentric control    Stairs  Stairs: No         Outcome Measures:  [facility] Basic Mobility  Turning from your back to your side while in a flat bed without using bedrails: A little  Moving from lying on your back to sitting on the side of a flat bed without using bedrails: A little  Moving to and from bed to chair (including a wheelchair): A little  Standing up from a chair using your arms (e.g. wheelchair or bedside chair): A little  To walk in hospital room: A little  Climbing 3-5 steps with railing: A lot  Basic Mobility - Total Score: 17    Education Documentation  No documentation found.  Education Comments  No comments found.        OP EDUCATION:       Encounter Problems       Encounter Problems (Active)       Mobility       STG - Patient will ambulate 100' with rolling walker and distant supervision  (Progressing)       Start:  08/18/24    Expected End:  09/15/24               PT Transfers       STG - Transfer from bed to chair distant supervision (Progressing)       Start:  08/18/24    Expected End:  09/15/24            STG - Patient to transfer to and from sit to supine distant supervision (Progressing)       Start:  08/18/24    Expected End:  09/15/24            STG - Patient will transfer sit to and from stand distant supervision. (Progressing)       Start:  08/18/24    Expected End:  09/15/24

## 2024-08-19 NOTE — PROGRESS NOTES
"Annetta Miguel is a 89 y.o. female on day 2 of admission presenting with Diarrhea.    Subjective   Seen for chronically stage V metabolic acidosis she feels a whole lot better no diarrhea no nausea or vomiting up in chair eating lunch       Objective     Physical Exam  Neck:      Vascular: No carotid bruit.   Cardiovascular:      Rate and Rhythm: Normal rate and regular rhythm.      Heart sounds: No murmur heard.     No friction rub. No gallop.   Pulmonary:      Breath sounds: No wheezing, rhonchi or rales.   Chest:      Chest wall: No tenderness.   Abdominal:      General: There is no distension.      Tenderness: There is no abdominal tenderness. There is no guarding or rebound.   Musculoskeletal:         General: No swelling or tenderness.      Cervical back: Neck supple.      Right lower leg: No edema.      Left lower leg: No edema.   Lymphadenopathy:      Cervical: No cervical adenopathy.         Last Recorded Vitals  Blood pressure 142/52, pulse 87, temperature 36.3 °C (97.3 °F), temperature source Temporal, resp. rate 17, height 1.575 m (5' 2.01\"), weight 65.8 kg (145 lb), SpO2 98%.    Intake/Output last 3 Shifts:  I/O last 3 completed shifts:  In: 3556.7 (53.8 mL/kg) [P.O.:500; I.V.:2906.7 (44 mL/kg); IV Piggyback:150]  Out: 1325 (20 mL/kg) [Urine:1325 (0.6 mL/kg/hr)]  Weight: 66.1 kg     Current Facility-Administered Medications:     atorvastatin (Lipitor) tablet 10 mg, 10 mg, oral, Nightly, Karo Cowan, APRN-CNP, 10 mg at 08/18/24 2148    calcitriol (Rocaltrol) capsule 0.25 mcg, 0.25 mcg, oral, Daily, Karo BARRON Amusat, APRN-CNP, 0.25 mcg at 08/19/24 0813    cefTRIAXone (Rocephin) 1 g in dextrose (iso) IV 50 mL, 1 g, intravenous, q24h, Karo BARRON Amusat, APRN-CNP, Stopped at 08/19/24 1036    epoetin sabrina-epbx (Retacrit) injection 10,000 Units, 10,000 Units, subcutaneous, Once per day on Monday Wednesday Friday, Antione Littlejohn MD, 10,000 Units at 08/19/24 1006    ferrous sulfate (325 mg ferrous sulfate) " tablet 1 tablet, 65 mg of iron, oral, Daily with breakfast, Karo P Amusat, APRN-CNP, 1 tablet at 08/19/24 0813    heparin (porcine) injection 5,000 Units, 5,000 Units, subcutaneous, q12h NOÉ, Karo P Amusat, APRN-CNP, 5,000 Units at 08/19/24 0813    melatonin tablet 5 mg, 5 mg, oral, Nightly, Karo P Amusat, APRN-CNP, 5 mg at 08/18/24 2148    ondansetron (Zofran) injection 4 mg, 4 mg, intravenous, q8h PRN, Karo P Amusat, APRN-CNP    pantoprazole (ProtoNix) EC tablet 40 mg, 40 mg, oral, Daily before breakfast, 40 mg at 08/19/24 0612 **OR** pantoprazole (ProtoNix) injection 40 mg, 40 mg, intravenous, Daily before breakfast, Karo P Amusat, APRN-CNP    perflutren lipid microspheres (Definity) injection 0.5-10 mL of dilution, 0.5-10 mL of dilution, intravenous, Once in imaging, Karo P Amusat, APRN-CNP    perflutren protein A microsphere (Optison) injection 0.5 mL, 0.5 mL, intravenous, Once in imaging, Karo BARRON Amusat, APRN-CNP    sevelamer carbonate (Renvela) tablet 800 mg, 800 mg, oral, TID, Karo BARRON Amusat, APRN-CNP, 800 mg at 08/19/24 0813    sodium bicarbonate 150 mEq in dextrose 5% 1,150 mL infusion, 75 mL/hr, intravenous, Continuous, Antione Littlejohn MD, Last Rate: 75 mL/hr at 08/19/24 0956, 75 mL/hr at 08/19/24 0956    sulfur hexafluoride microsphr (Lumason) injection 24.28 mg, 2 mL, intravenous, Once in imaging, Karo BARRON Amusat, APRN-CNP   Relevant Results    Results for orders placed or performed during the hospital encounter of 08/17/24 (from the past 96 hour(s))   CBC and Auto Differential   Result Value Ref Range    WBC 23.1 (H) 4.4 - 11.3 x10*3/uL    nRBC 0.0 0.0 - 0.0 /100 WBCs    RBC 2.46 (L) 4.00 - 5.20 x10*6/uL    Hemoglobin 7.9 (L) 12.0 - 16.0 g/dL    Hematocrit 22.7 (L) 36.0 - 46.0 %    MCV 92 80 - 100 fL    MCH 32.1 26.0 - 34.0 pg    MCHC 34.8 32.0 - 36.0 g/dL    RDW 12.8 11.5 - 14.5 %    Platelets 130 (L) 150 - 450 x10*3/uL    Immature Granulocytes %, Automated 2.8 (H) 0.0 - 0.9 %    Immature Granulocytes  Absolute, Automated 0.65 (H) 0.00 - 0.50 x10*3/uL   Comprehensive metabolic panel   Result Value Ref Range    Glucose 105 (H) 65 - 99 mg/dL    Sodium 126 (L) 133 - 145 mmol/L    Potassium 3.5 3.4 - 5.1 mmol/L    Chloride 93 (L) 97 - 107 mmol/L    Bicarbonate 14 (L) 24 - 31 mmol/L    Urea Nitrogen 65 (H) 8 - 25 mg/dL    Creatinine 4.40 (H) 0.40 - 1.60 mg/dL    eGFR 9 (L) >60 mL/min/1.73m*2    Calcium 5.8 (LL) 8.5 - 10.4 mg/dL    Albumin 3.0 (L) 3.5 - 5.0 g/dL    Alkaline Phosphatase 65 35 - 125 U/L    Total Protein 5.8 (L) 5.9 - 7.9 g/dL    AST 27 5 - 40 U/L    Bilirubin, Total 0.9 0.1 - 1.2 mg/dL    ALT 15 5 - 40 U/L    Anion Gap 19 <=19 mmol/L   Sars-CoV-2 PCR   Result Value Ref Range    Coronavirus 2019, PCR Not Detected Not Detected   Serial Troponin, Initial ([facility])   Result Value Ref Range    Troponin T, High Sensitivity 176 (HH) <=14 ng/L   Manual Differential   Result Value Ref Range    Neutrophils %, Manual 83.0 40.0 - 80.0 %    Bands %, Manual 10.0 0.0 - 5.0 %    Lymphocytes %, Manual 4.0 13.0 - 44.0 %    Monocytes %, Manual 2.0 2.0 - 10.0 %    Eosinophils %, Manual 0.0 0.0 - 6.0 %    Basophils %, Manual 0.0 0.0 - 2.0 %    Metamyelocytes %, Manual 1.0 0.0 - 0.0 %    Seg Neutrophils Absolute, Manual 19.17 (H) 1.60 - 5.00 x10*3/uL    Bands Absolute, Manual 2.31 (H) 0.00 - 0.50 x10*3/uL    Lymphocytes Absolute, Manual 0.92 0.80 - 3.00 x10*3/uL    Monocytes Absolute, Manual 0.46 0.05 - 0.80 x10*3/uL    Eosinophils Absolute, Manual 0.00 0.00 - 0.40 x10*3/uL    Basophils Absolute, Manual 0.00 0.00 - 0.10 x10*3/uL    Metamyelocytes Absolute, Manual 0.23 0.00 - 0.00 x10*3/uL    Total Cells Counted 100     Neutrophils Absolute, Manual 21.48 (H) 1.60 - 5.50 x10*3/uL    RBC Morphology See Below     Ovalocytes Few     John Cells Many     Dohle Bodies Present     Vacuolated Neutrophils Present     Clumped Platelets Present    Magnesium   Result Value Ref Range    Magnesium 1.10 (L) 1.60 - 3.10 mg/dL   ECG 12 lead    Result Value Ref Range    Ventricular Rate 91 BPM    Atrial Rate 91 BPM    NE Interval 144 ms    QRS Duration 134 ms    QT Interval 430 ms    QTC Calculation(Bazett) 528 ms    P Axis 75 degrees    R Axis 70 degrees    T Axis 67 degrees    QRS Count 15 beats    Q Onset 216 ms    P Onset 144 ms    P Offset 196 ms    T Offset 431 ms    QTC Fredericia 494 ms   Urinalysis with Reflex Culture and Microscopic   Result Value Ref Range    Color, Urine Yellow Light-Yellow, Yellow, Dark-Yellow    Appearance, Urine Turbid (N) Clear    Specific Gravity, Urine 1.012 1.005 - 1.035    pH, Urine 6.0 5.0, 5.5, 6.0, 6.5, 7.0, 7.5, 8.0    Protein, Urine 300 (3+) (A) NEGATIVE, 10 (TRACE), 20 (TRACE) mg/dL    Glucose, Urine Normal Normal mg/dL    Blood, Urine 1.0 (3+) (A) NEGATIVE    Ketones, Urine NEGATIVE NEGATIVE mg/dL    Bilirubin, Urine NEGATIVE NEGATIVE    Urobilinogen, Urine Normal Normal mg/dL    Nitrite, Urine NEGATIVE NEGATIVE    Leukocyte Esterase, Urine 500 Jessica/µL (A) NEGATIVE   Extra Urine Gray Tube   Result Value Ref Range    Extra Tube Hold for add-ons.    Microscopic Only, Urine   Result Value Ref Range    WBC, Urine >50 (A) 1-5, NONE /HPF    WBC Clumps, Urine FEW Reference range not established. /HPF    RBC, Urine 6-10 (A) NONE, 1-2, 3-5 /HPF    Bacteria, Urine 2+ (A) NONE SEEN /HPF   Urine Culture    Specimen: Clean Catch/Voided; Urine   Result Value Ref Range    Urine Culture >100,000 Enteric bacilli (A)    Serial Troponin, 2 Hour ([facility])   Result Value Ref Range    Troponin T, High Sensitivity 157 (HH) <=14 ng/L   Blood Gas Lactic Acid, Venous   Result Value Ref Range    POCT Lactate, Venous 1.0 0.4 - 2.0 mmol/L   Blood Culture    Specimen: Peripheral Venipuncture; Blood culture   Result Value Ref Range    Blood Culture No growth at 1 day    Blood Culture    Specimen: Peripheral Venipuncture; Blood culture   Result Value Ref Range    Blood Culture No growth at 1 day    Serial Troponin, 6 Hour ([facility])   Result Value  Ref Range    Troponin T, High Sensitivity 135 (HH) <=14 ng/L   Comprehensive metabolic panel   Result Value Ref Range    Glucose 146 (H) 65 - 99 mg/dL    Sodium 127 (L) 133 - 145 mmol/L    Potassium 3.0 (L) 3.4 - 5.1 mmol/L    Chloride 95 (L) 97 - 107 mmol/L    Bicarbonate 13 (L) 24 - 31 mmol/L    Urea Nitrogen 62 (H) 8 - 25 mg/dL    Creatinine 3.70 (H) 0.40 - 1.60 mg/dL    eGFR 11 (L) >60 mL/min/1.73m*2    Calcium 5.5 (LL) 8.5 - 10.4 mg/dL    Albumin 2.6 (L) 3.5 - 5.0 g/dL    Alkaline Phosphatase 51 35 - 125 U/L    Total Protein 5.0 (L) 5.9 - 7.9 g/dL    AST 25 5 - 40 U/L    Bilirubin, Total 0.6 0.1 - 1.2 mg/dL    ALT 12 5 - 40 U/L    Anion Gap 19 <=19 mmol/L   CBC and Auto Differential   Result Value Ref Range    WBC 20.1 (H) 4.4 - 11.3 x10*3/uL    nRBC 0.0 0.0 - 0.0 /100 WBCs    RBC 2.43 (L) 4.00 - 5.20 x10*6/uL    Hemoglobin 7.7 (L) 12.0 - 16.0 g/dL    Hematocrit 22.5 (L) 36.0 - 46.0 %    MCV 93 80 - 100 fL    MCH 31.7 26.0 - 34.0 pg    MCHC 34.2 32.0 - 36.0 g/dL    RDW 12.7 11.5 - 14.5 %    Platelets 115 (L) 150 - 450 x10*3/uL    Immature Granulocytes %, Automated 1.6 (H) 0.0 - 0.9 %    Immature Granulocytes Absolute, Automated 0.32 0.00 - 0.50 x10*3/uL   Magnesium   Result Value Ref Range    Magnesium 3.10 1.60 - 3.10 mg/dL   Manual Differential   Result Value Ref Range    Neutrophils %, Manual 80.0 40.0 - 80.0 %    Bands %, Manual 13.0 0.0 - 5.0 %    Lymphocytes %, Manual 3.0 13.0 - 44.0 %    Monocytes %, Manual 2.0 2.0 - 10.0 %    Eosinophils %, Manual 0.0 0.0 - 6.0 %    Basophils %, Manual 0.0 0.0 - 2.0 %    Metamyelocytes %, Manual 2.0 0.0 - 0.0 %    Seg Neutrophils Absolute, Manual 16.08 (H) 1.60 - 5.00 x10*3/uL    Bands Absolute, Manual 2.61 (H) 0.00 - 0.50 x10*3/uL    Lymphocytes Absolute, Manual 0.60 (L) 0.80 - 3.00 x10*3/uL    Monocytes Absolute, Manual 0.40 0.05 - 0.80 x10*3/uL    Eosinophils Absolute, Manual 0.00 0.00 - 0.40 x10*3/uL    Basophils Absolute, Manual 0.00 0.00 - 0.10 x10*3/uL     Metamyelocytes Absolute, Manual 0.40 0.00 - 0.00 x10*3/uL    Total Cells Counted 100     Neutrophils Absolute, Manual 18.69 (H) 1.60 - 5.50 x10*3/uL    RBC Morphology See Below     Ovalocytes Few     Bowie Cells Many     Dohle Bodies Present     Vacuolated Neutrophils Present    Hemoglobin A1c   Result Value Ref Range    Hemoglobin A1C 5.7 (H) See below %    Estimated Average Glucose 117 Not Established mg/dL   NT Pro-BNP   Result Value Ref Range    PROBNP 19,580 (H) 0 - 624 pg/mL   Calcium, ionized   Result Value Ref Range    POCT Calcium, Ionized 0.72 (LL) 1.1 - 1.33 mmol/L   Iron and TIBC   Result Value Ref Range    Iron <20 (L) 30 - 160 ug/dL    UIBC 121 110 - 370 ug/dL    TIBC      % Saturation     Ferritin   Result Value Ref Range    Ferritin 862 (H) 13 - 150 ng/mL   Folate   Result Value Ref Range    Folate, Serum 8.3 4.2 - 19.9 ng/mL   Comprehensive metabolic panel   Result Value Ref Range    Glucose 84 65 - 99 mg/dL    Sodium 126 (L) 133 - 145 mmol/L    Potassium 3.7 3.4 - 5.1 mmol/L    Chloride 93 (L) 97 - 107 mmol/L    Bicarbonate 13 (L) 24 - 31 mmol/L    Urea Nitrogen 66 (H) 8 - 25 mg/dL    Creatinine 3.90 (H) 0.40 - 1.60 mg/dL    eGFR 10 (L) >60 mL/min/1.73m*2    Calcium 7.4 (L) 8.5 - 10.4 mg/dL    Albumin 2.9 (L) 3.5 - 5.0 g/dL    Alkaline Phosphatase 54 35 - 125 U/L    Total Protein 5.1 (L) 5.9 - 7.9 g/dL    AST 30 5 - 40 U/L    Bilirubin, Total 0.5 0.1 - 1.2 mg/dL    ALT 15 5 - 40 U/L    Anion Gap >19 (H) <=19 mmol/L   Magnesium   Result Value Ref Range    Magnesium 1.60 1.60 - 3.10 mg/dL   PTH, Intact   Result Value Ref Range    Parathyroid Hormone, Intact 785.6 (H) 18.5 - 88.0 pg/mL   CBC and Auto Differential   Result Value Ref Range    WBC 16.4 (H) 4.4 - 11.3 x10*3/uL    nRBC 0.0 0.0 - 0.0 /100 WBCs    RBC 2.56 (L) 4.00 - 5.20 x10*6/uL    Hemoglobin 8.1 (L) 12.0 - 16.0 g/dL    Hematocrit 23.7 (L) 36.0 - 46.0 %    MCV 93 80 - 100 fL    MCH 31.6 26.0 - 34.0 pg    MCHC 34.2 32.0 - 36.0 g/dL    RDW  12.8 11.5 - 14.5 %    Platelets 126 (L) 150 - 450 x10*3/uL    Neutrophils % 92.0 40.0 - 80.0 %    Immature Granulocytes %, Automated 0.9 0.0 - 0.9 %    Lymphocytes % 3.5 13.0 - 44.0 %    Monocytes % 3.4 2.0 - 10.0 %    Eosinophils % 0.1 0.0 - 6.0 %    Basophils % 0.1 0.0 - 2.0 %    Neutrophils Absolute 15.10 (H) 1.60 - 5.50 x10*3/uL    Immature Granulocytes Absolute, Automated 0.15 0.00 - 0.50 x10*3/uL    Lymphocytes Absolute 0.58 (L) 0.80 - 3.00 x10*3/uL    Monocytes Absolute 0.56 0.05 - 0.80 x10*3/uL    Eosinophils Absolute 0.01 0.00 - 0.40 x10*3/uL    Basophils Absolute 0.02 0.00 - 0.10 x10*3/uL   Comprehensive metabolic panel   Result Value Ref Range    Glucose 74 65 - 99 mg/dL    Sodium 129 (L) 133 - 145 mmol/L    Potassium 4.0 3.4 - 5.1 mmol/L    Chloride 98 97 - 107 mmol/L    Bicarbonate 14 (L) 24 - 31 mmol/L    Urea Nitrogen 66 (H) 8 - 25 mg/dL    Creatinine 4.10 (H) 0.40 - 1.60 mg/dL    eGFR 10 (L) >60 mL/min/1.73m*2    Calcium 6.4 (L) 8.5 - 10.4 mg/dL    Albumin 2.7 (L) 3.5 - 5.0 g/dL    Alkaline Phosphatase 53 35 - 125 U/L    Total Protein 5.1 (L) 5.9 - 7.9 g/dL    AST 27 5 - 40 U/L    Bilirubin, Total 0.4 0.1 - 1.2 mg/dL    ALT 14 5 - 40 U/L    Anion Gap 17 <=19 mmol/L   Creatine Kinase   Result Value Ref Range    Creatine Kinase 348 (H) 24 - 195 U/L   POCT GLUCOSE   Result Value Ref Range    POCT Glucose 101 (H) 74 - 99 mg/dL   Vitamin D 25-Hydroxy,Total (for eval of Vitamin D levels)   Result Value Ref Range    Vitamin D, 25-Hydroxy, Total 14 (L) 31 - 100 ng/mL   POCT GLUCOSE   Result Value Ref Range    POCT Glucose 140 (H) 74 - 99 mg/dL   CBC and Auto Differential   Result Value Ref Range    WBC 13.0 (H) 4.4 - 11.3 x10*3/uL    nRBC 0.0 0.0 - 0.0 /100 WBCs    RBC 2.29 (L) 4.00 - 5.20 x10*6/uL    Hemoglobin 7.2 (L) 12.0 - 16.0 g/dL    Hematocrit 20.7 (L) 36.0 - 46.0 %    MCV 90 80 - 100 fL    MCH 31.4 26.0 - 34.0 pg    MCHC 34.8 32.0 - 36.0 g/dL    RDW 12.9 11.5 - 14.5 %    Platelets 113 (L) 150 - 450  x10*3/uL    Neutrophils % 91.3 40.0 - 80.0 %    Immature Granulocytes %, Automated 1.0 (H) 0.0 - 0.9 %    Lymphocytes % 4.5 13.0 - 44.0 %    Monocytes % 3.0 2.0 - 10.0 %    Eosinophils % 0.1 0.0 - 6.0 %    Basophils % 0.1 0.0 - 2.0 %    Neutrophils Absolute 11.88 (H) 1.60 - 5.50 x10*3/uL    Immature Granulocytes Absolute, Automated 0.13 0.00 - 0.50 x10*3/uL    Lymphocytes Absolute 0.59 (L) 0.80 - 3.00 x10*3/uL    Monocytes Absolute 0.39 0.05 - 0.80 x10*3/uL    Eosinophils Absolute 0.01 0.00 - 0.40 x10*3/uL    Basophils Absolute 0.01 0.00 - 0.10 x10*3/uL   Comprehensive metabolic panel   Result Value Ref Range    Glucose 129 (H) 65 - 99 mg/dL    Sodium 127 (L) 133 - 145 mmol/L    Potassium 3.6 3.4 - 5.1 mmol/L    Chloride 95 (L) 97 - 107 mmol/L    Bicarbonate 18 (L) 24 - 31 mmol/L    Urea Nitrogen 65 (H) 8 - 25 mg/dL    Creatinine 3.70 (H) 0.40 - 1.60 mg/dL    eGFR 11 (L) >60 mL/min/1.73m*2    Calcium 5.8 (LL) 8.5 - 10.4 mg/dL    Albumin 2.5 (L) 3.5 - 5.0 g/dL    Alkaline Phosphatase 53 35 - 125 U/L    Total Protein 4.8 (L) 5.9 - 7.9 g/dL    AST 19 5 - 40 U/L    Bilirubin, Total 0.3 0.1 - 1.2 mg/dL    ALT 13 5 - 40 U/L    Anion Gap 14 <=19 mmol/L   Creatine Kinase   Result Value Ref Range    Creatine Kinase 141 24 - 195 U/L   Phosphorus   Result Value Ref Range    Phosphorus 2.6 2.5 - 4.5 mg/dL   Transthoracic Echo (TTE) Complete   Result Value Ref Range    AV pk alfredo 1.53 m/s    LVOT diam 2.02 cm    AV mn grad 5.3 mmHg    MV E/A ratio 0.72     Tricuspid annular plane systolic excursion 3.0 cm    LV Biplane EF 69 %    LA vol index A/L 40.2 ml/m2    MV avg E/e' ratio 12.76     LV EF 60 %    RV free wall pk S' 18.05 cm/s    LVIDd 4.05 cm    AV pk grad 9.3 mmHg    Aortic Valve Area by Continuity of VTI 2.42 cm2    Aortic Valve Area by Continuity of Peak Velocity 2.77 cm2    LV A4C EF 71.6        Assessment/Plan   Chronic kidney stage V I believe the patient does have uremic symptoms however the patient made it clear to  us no dialysis  Severe metabolic acidosis secondary to her kidney disease I will discontinue sodium bicarb drip and place her on oral sodium bicarbonate tablets  Hyponatremia secondary to volume depletion sodium is stable  Hypomagnesemia secondary to GI loss with severe diarrhea it is back to normal  Per calcium is secondary to hypomagnesemia and her chronic kidney disease show patient does have vitamin D deficiency vitamin D deficiency  Acute urinary tract infection  Out C. difficile which I doubt  Anemia of chronic kidney disease  Secondary hyperparathyroidism will start her on calcitriol  Bilateral renal cysts                  Antione Littlejohn MD

## 2024-08-19 NOTE — PROGRESS NOTES
Annetta Miguel is a 89 y.o. female on day 2 of admission presenting with Diarrhea.      Subjective   Patient examined sitting up in bed.  Daughter is at bedside.  She states that she was doing better and would like to be advanced to a regular diet.  Denies any chest pain, shortness of breath, or abdominal pain.       Objective     Last Recorded Vitals  /52 (BP Location: Left arm, Patient Position: Lying)   Pulse 87   Temp 36.3 °C (97.3 °F) (Temporal)   Resp 17   Wt 65.8 kg (145 lb)   SpO2 98%   Intake/Output last 3 Shifts:    Intake/Output Summary (Last 24 hours) at 8/19/2024 1133  Last data filed at 8/19/2024 1006  Gross per 24 hour   Intake 2351.67 ml   Output 725 ml   Net 1626.67 ml       Admission Weight  Weight: 63.5 kg (140 lb) (08/17/24 0935)    Daily Weight  08/19/24 : 65.8 kg (145 lb)    Image Results  XR knee right 4+ views  Narrative: Interpreted By:  Obi Carrera,   STUDY:  XR KNEE RIGHT 4+ VIEWS; 8/18/2024 4:12 pm      INDICATION:  Signs/Symptoms:right knee lesion      COMPARISON:  None.      ACCESSION NUMBER(S):  [id number]      ORDERING CLINICIAN:  MIGUEL A SCOTT      TECHNIQUE:  Number of films: Five view radiographs of the right knee.      FINDINGS:  No fractures or destructive lesions are identified. Marked  degenerative changes are again present involving the right knee  joint, especially the lateral compartment, with joint space  narrowing, subchondral bone sclerosis, subchondral cysts and  osteophytes. The alignment is anatomic. Vascular calcifications are  present. There is small effusion in the suprapatellar bursa.      Impression: Extensive osteoarthritis, mostly laterally and small suprapatellar  joint effusion.      Signed by: Obi Carrera 8/19/2024 11:22 AM  Dictation workstation:   [id number]  Transthoracic Echo (TTE) [place] [id number]             Phone [phone]    TRANSTHORACIC  ECHOCARDIOGRAM REPORT    Patient Name:      MARCOS RAJAN              Reading Physician:    [id number] Neville Centeno MD  Study Date:        8/19/2024              Ordering Provider:    [id number] MIGUEL A SCOTT  MRN/PID:           [id number]               Fellow:  Accession#:        [id number]           Nurse:  Date of Birth/Age: 6/4/1930 / 89 years    Sonographer:          Juli George  Gender:            F                      Additional Staff:  Height:            157.48 cm              Admit Date:  Weight:            65.77 kg               Admission Status:  BSA / BMI:         1.67 m2 / 26.52 kg/m2  Department Location:  [facility]  Blood Pressure: 106 /42 mmHg    Study Type:    TRANSTHORACIC ECHO (TTE) COMPLETE  Diagnosis/ICD: Elevated Troponin-R79.89  CPT Codes:     Echo Complete w Full [id number]    Patient History:  Pertinent History: HTN, Hyperlipidemia and CKD.    Study Detail: The following Echo studies were performed: 3D, Strain, color flow,                Doppler, M-Mode and 2D.       PHYSICIAN INTERPRETATION:  Left Ventricle: Left ventricular ejection fraction is normal, by visual estimate at 60%. There are no regional wall motion abnormalities. The left ventricular cavity size is normal. There is mild left ventricular hypertrophy involving the basal wall and septal wall. Spectral Doppler shows an impaired relaxation pattern of left ventricular diastolic filling.  Left Atrium: The left atrium is mildly dilated.  Right Ventricle: The right ventricle is upper limits of normal in size. There is normal right ventricular global systolic function.  Right Atrium: The right atrium is normal in size.  Aortic Valve: The aortic valve is trileaflet. There is mild aortic valve cusp calcification. There is mild aortic valve thickening. There is evidence of mildly elevated transaortic gradients  consistent with sclerosis of the aortic valve.  The aortic valve dimensionless index is 0.76. There is trace aortic valve regurgitation. The peak instantaneous gradient of the aortic valve is 9.3 mmHg. The mean gradient of the aortic valve is 5.3 mmHg.  Mitral Valve: The mitral valve is mildly thickened. There is mild mitral valve regurgitation.  Tricuspid Valve: The tricuspid valve is structurally normal. There is trace tricuspid regurgitation.  Pulmonic Valve: The pulmonic valve is structurally normal. There is no indication of pulmonic valve regurgitation.  Pericardium: There is no pericardial effusion noted.  Aorta: The aortic root was not assessed.       CONCLUSIONS:   1. Left ventricular ejection fraction is normal, by visual estimate at 60%.   2. Spectral Doppler shows an impaired relaxation pattern of left ventricular diastolic filling.   3. There is normal right ventricular global systolic function.   4. Aortic valve sclerosis.    QUANTITATIVE DATA SUMMARY:  2D MEASUREMENTS:                           Normal Ranges:  IVSd:          0.93 cm   (0.6-1.1cm)  LVPWd:         0.96 cm   (0.6-1.1cm)  LVIDd:         4.05 cm   (3.9-5.9cm)  LVIDs:         2.18 cm  LV Mass Index: 72.0 g/m2  LV % FS        46.3 %    LA VOLUME:                               Normal Ranges:  LA Vol A4C:       61.2 ml    (22+/-6mL/m2)  LA Vol A2C:       71.0 ml  LA Vol BP:        67.1 ml  LA Vol Index A4C: 36.7 ml/m2  LA Vol Index A2C: 42.6 ml/m2  LA Vol Index BP:  40.2 ml/m2  LA Vol A4C:       58.9 ml  LA Vol A2C:       67.4 ml  LA Vol Index BSA: 37.9 ml/m2    RA VOLUME BY A/L METHOD:                        Normal Ranges:  RA Area A4C: 14.0 cm2    LV SYSTOLIC FUNCTION BY 2D PLANIMETRY (MOD):                       Normal Ranges:  EF-A4C View:    72 % (>=55%)  EF-A2C View:    68 %  EF-Biplane:     69 %  EF-Visual:      60 %  EF-Auto:        59 %  LV EF Reported: 60 %    LV DIASTOLIC FUNCTION:                          Normal Ranges:  MV Peak E:     0.91 m/s  (0.7-1.2 m/s)  MV Peak A:    1.26 m/s  (0.42-0.7 m/s)  E/A Ratio:    0.72      (1.0-2.2)  MV e'         0.071 m/s (>8.0)  MV lateral e' 0.07 m/s  MV medial e'  0.07 m/s  E/e' Ratio:   12.76     (<8.0)    MITRAL VALVE:                  Normal Ranges:  MV DT: 261 msec (150-240msec)    AORTIC VALVE:                                    Normal Ranges:  AoV Vmax:                1.53 m/s (<=1.7m/s)  AoV Peak P.3 mmHg (<20mmHg)  AoV Mean P.3 mmHg (1.7-11.5mmHg)  LVOT Max José Antonio:            1.33 m/s (<=1.1m/s)  AoV VTI:                 30.73 cm (18-25cm)  LVOT VTI:                23.27 cm  LVOT Diameter:           2.02 cm  (1.8-2.4cm)  AoV Area, VTI:           2.42 cm2 (2.5-5.5cm2)  AoV Area,Vmax:           2.77 cm2 (2.5-4.5cm2)  AoV Dimensionless Index: 0.76       RIGHT VENTRICLE:  RV Basal 4.32 cm  TAPSE:   30.5 mm  RV s'    0.18 m/s    TRICUSPID VALVE/RVSP:                    Normal Ranges:  IVC Diam: 2.21 cm       [id number] Neville Centeno MD  Electronically signed on 2024 at 10:25:19 AM       ** Final **  ECG 12 lead  Normal sinus rhythm  Possible Left atrial enlargement  Right bundle branch block  Abnormal ECG  No previous ECGs available      Physical Exam  Constitutional: No acute distress, calm, cooperative  HEENT: PERRL, normocephalic, atraumatic, mucous membranes moist  Cardiovascular: Regular rhythm and rate,   Respiratory: Lungs clear to auscultation,   Gastrointestinal: Bowel sounds positive x 4, soft, nontender  Neurologic: Alert and oriented x 3 equal strength bilaterally  Musculoskeletal: Able to move all extremities, no edema  Skin: Warm, dry and intact  Relevant Results               Assessment/Plan        Gastroenteritis  CT showed dilated small bowel loop  Diarrhea likely due to enteritis per general surgery  Advance to regular diet today  PT OT following  Care management to set up home with home health care    Acute dehydration-resolved  Hyponatremia, hypomagnesia,  hypocalcemia  Nephrology following, replace electrolyte imbalances  Follow electrolytes    Acute urinary retention  DC Wallace  Monitor I's and O  Bladder scan as needed    Acute on chronic anemia, thrombocytopenia  Hemoglobin 7.2 today  Nephrology following  Monitor CBC  Transfuse for hemoglobin less than 7    Acute on chronic kidney disease stage IV  Patient refuses hemodialysis under any circumstances  Nephrology following  Monitor renal function    Leukocytosis-resolved    Pyuria-resolved    Small right mastoid effusion  Confirmed on CT of the head  Continue antibiotics    Basilar infiltrate and effusion  Encourage I-S  Continue to monitor  Breathing comfortably on room air    Bilateral lower extremity edema-resolved    Hypertension  Continue home medication    Disposition  Diet advanced to regular today  Dr. Littlejohn has seen patient and is okay for discharge home with home health care  Care coordinators made aware-outside referral sent  Most likely be able to discharge tomorrow or Wednesday if tolerating diet and having no problems with urination          Assessment & Plan  Diarrhea                  Boni Stevens, APRN-CNP

## 2024-08-20 ENCOUNTER — APPOINTMENT (OUTPATIENT)
Dept: CARDIOLOGY | Facility: HOSPITAL | Age: 89
DRG: 872 | End: 2024-08-20
Payer: MEDICARE

## 2024-08-20 ENCOUNTER — PHARMACY VISIT (OUTPATIENT)
Dept: PHARMACY | Facility: CLINIC | Age: 89
End: 2024-08-20
Payer: MEDICARE

## 2024-08-20 VITALS
SYSTOLIC BLOOD PRESSURE: 142 MMHG | DIASTOLIC BLOOD PRESSURE: 62 MMHG | BODY MASS INDEX: 27.66 KG/M2 | WEIGHT: 150.3 LBS | HEART RATE: 95 BPM | HEIGHT: 62 IN | TEMPERATURE: 98.1 F | OXYGEN SATURATION: 98 % | RESPIRATION RATE: 17 BRPM

## 2024-08-20 DIAGNOSIS — N18.4 CHRONIC KIDNEY DISEASE, STAGE 4 (SEVERE) (MULTI): Primary | ICD-10-CM

## 2024-08-20 LAB
ALBUMIN SERPL-MCNC: 2.4 G/DL (ref 3.5–5)
ALP BLD-CCNC: 64 U/L (ref 35–125)
ALT SERPL-CCNC: 13 U/L (ref 5–40)
ANION GAP SERPL CALC-SCNC: 15 MMOL/L
AST SERPL-CCNC: 16 U/L (ref 5–40)
BACTERIA UR CULT: ABNORMAL
BASOPHILS # BLD AUTO: 0.02 X10*3/UL (ref 0–0.1)
BASOPHILS NFR BLD AUTO: 0.2 %
BILIRUB SERPL-MCNC: 0.3 MG/DL (ref 0.1–1.2)
BUN SERPL-MCNC: 63 MG/DL (ref 8–25)
CALCIUM SERPL-MCNC: 6 MG/DL (ref 8.5–10.4)
CHLORIDE SERPL-SCNC: 92 MMOL/L (ref 97–107)
CK SERPL-CCNC: 83 U/L (ref 24–195)
CO2 SERPL-SCNC: 19 MMOL/L (ref 24–31)
CREAT SERPL-MCNC: 3.4 MG/DL (ref 0.4–1.6)
EGFRCR SERPLBLD CKD-EPI 2021: 12 ML/MIN/1.73M*2
EOSINOPHIL # BLD AUTO: 0.03 X10*3/UL (ref 0–0.4)
EOSINOPHIL NFR BLD AUTO: 0.3 %
ERYTHROCYTE [DISTWIDTH] IN BLOOD BY AUTOMATED COUNT: 12.6 % (ref 11.5–14.5)
GLUCOSE SERPL-MCNC: 111 MG/DL (ref 65–99)
HCT VFR BLD AUTO: 20.6 % (ref 36–46)
HGB BLD-MCNC: 7.3 G/DL (ref 12–16)
IMM GRANULOCYTES # BLD AUTO: 0.06 X10*3/UL (ref 0–0.5)
IMM GRANULOCYTES NFR BLD AUTO: 0.5 % (ref 0–0.9)
LYMPHOCYTES # BLD AUTO: 0.75 X10*3/UL (ref 0.8–3)
LYMPHOCYTES NFR BLD AUTO: 6.6 %
MAGNESIUM SERPL-MCNC: 1.5 MG/DL (ref 1.6–3.1)
MCH RBC QN AUTO: 30.7 PG (ref 26–34)
MCHC RBC AUTO-ENTMCNC: 35.4 G/DL (ref 32–36)
MCV RBC AUTO: 87 FL (ref 80–100)
MONOCYTES # BLD AUTO: 0.54 X10*3/UL (ref 0.05–0.8)
MONOCYTES NFR BLD AUTO: 4.7 %
NEUTROPHILS # BLD AUTO: 9.98 X10*3/UL (ref 1.6–5.5)
NEUTROPHILS NFR BLD AUTO: 87.7 %
NRBC BLD-RTO: 0 /100 WBCS (ref 0–0)
PHOSPHATE SERPL-MCNC: 2.4 MG/DL (ref 2.5–4.5)
PLATELET # BLD AUTO: 106 X10*3/UL (ref 150–450)
POTASSIUM SERPL-SCNC: 3.7 MMOL/L (ref 3.4–5.1)
PROT SERPL-MCNC: 5 G/DL (ref 5.9–7.9)
RBC # BLD AUTO: 2.38 X10*6/UL (ref 4–5.2)
SODIUM SERPL-SCNC: 126 MMOL/L (ref 133–145)
WBC # BLD AUTO: 11.4 X10*3/UL (ref 4.4–11.3)

## 2024-08-20 PROCEDURE — 84075 ASSAY ALKALINE PHOSPHATASE: CPT | Performed by: NURSE PRACTITIONER

## 2024-08-20 PROCEDURE — 82550 ASSAY OF CK (CPK): CPT

## 2024-08-20 PROCEDURE — 85025 COMPLETE CBC W/AUTO DIFF WBC: CPT | Performed by: NURSE PRACTITIONER

## 2024-08-20 PROCEDURE — 36415 COLL VENOUS BLD VENIPUNCTURE: CPT | Performed by: NURSE PRACTITIONER

## 2024-08-20 PROCEDURE — 93005 ELECTROCARDIOGRAM TRACING: CPT

## 2024-08-20 PROCEDURE — 97116 GAIT TRAINING THERAPY: CPT | Mod: GP

## 2024-08-20 PROCEDURE — 2500000001 HC RX 250 WO HCPCS SELF ADMINISTERED DRUGS (ALT 637 FOR MEDICARE OP): Performed by: INTERNAL MEDICINE

## 2024-08-20 PROCEDURE — 2500000004 HC RX 250 GENERAL PHARMACY W/ HCPCS (ALT 636 FOR OP/ED): Performed by: INTERNAL MEDICINE

## 2024-08-20 PROCEDURE — 2500000001 HC RX 250 WO HCPCS SELF ADMINISTERED DRUGS (ALT 637 FOR MEDICARE OP)

## 2024-08-20 PROCEDURE — 2500000004 HC RX 250 GENERAL PHARMACY W/ HCPCS (ALT 636 FOR OP/ED): Performed by: NURSE PRACTITIONER

## 2024-08-20 PROCEDURE — 97530 THERAPEUTIC ACTIVITIES: CPT | Mod: GP

## 2024-08-20 PROCEDURE — 83735 ASSAY OF MAGNESIUM: CPT | Performed by: NURSE PRACTITIONER

## 2024-08-20 PROCEDURE — 2500000004 HC RX 250 GENERAL PHARMACY W/ HCPCS (ALT 636 FOR OP/ED)

## 2024-08-20 PROCEDURE — 84100 ASSAY OF PHOSPHORUS: CPT | Performed by: INTERNAL MEDICINE

## 2024-08-20 PROCEDURE — RXMED WILLOW AMBULATORY MEDICATION CHARGE

## 2024-08-20 PROCEDURE — 2500000002 HC RX 250 W HCPCS SELF ADMINISTERED DRUGS (ALT 637 FOR MEDICARE OP, ALT 636 FOR OP/ED)

## 2024-08-20 RX ORDER — MAGNESIUM SULFATE HEPTAHYDRATE 40 MG/ML
2 INJECTION, SOLUTION INTRAVENOUS ONCE
Status: COMPLETED | OUTPATIENT
Start: 2024-08-20 | End: 2024-08-20

## 2024-08-20 RX ORDER — FERROUS SULFATE 325(65) MG
325 TABLET ORAL
Qty: 90 TABLET | Refills: 0 | Status: SHIPPED | OUTPATIENT
Start: 2024-08-21 | End: 2024-08-24 | Stop reason: HOSPADM

## 2024-08-20 RX ORDER — CEFUROXIME AXETIL 500 MG/1
500 TABLET ORAL 2 TIMES DAILY
Qty: 8 TABLET | Refills: 0 | Status: SHIPPED | OUTPATIENT
Start: 2024-08-20 | End: 2024-08-24 | Stop reason: HOSPADM

## 2024-08-20 RX ADMIN — CALCITRIOL CAPSULES 0.25 MCG 0.5 MCG: 0.25 CAPSULE ORAL at 09:33

## 2024-08-20 RX ADMIN — CEFTRIAXONE SODIUM 1 G: 1 INJECTION, SOLUTION INTRAVENOUS at 09:33

## 2024-08-20 RX ADMIN — MAGNESIUM SULFATE HEPTAHYDRATE 2 G: 40 INJECTION, SOLUTION INTRAVENOUS at 15:00

## 2024-08-20 RX ADMIN — HEPARIN SODIUM 5000 UNITS: 5000 INJECTION, SOLUTION INTRAVENOUS; SUBCUTANEOUS at 09:33

## 2024-08-20 RX ADMIN — SEVELAMER CARBONATE 800 MG: 800 TABLET, FILM COATED ORAL at 12:14

## 2024-08-20 RX ADMIN — PANTOPRAZOLE SODIUM 40 MG: 40 TABLET, DELAYED RELEASE ORAL at 06:59

## 2024-08-20 RX ADMIN — SEVELAMER CARBONATE 800 MG: 800 TABLET, FILM COATED ORAL at 09:33

## 2024-08-20 RX ADMIN — FERROUS SULFATE TAB 325 MG (65 MG ELEMENTAL FE) 1 TABLET: 325 (65 FE) TAB at 09:33

## 2024-08-20 RX ADMIN — SODIUM BICARBONATE 650 MG TABLET 650 MG: at 15:28

## 2024-08-20 RX ADMIN — SODIUM BICARBONATE 650 MG TABLET 650 MG: at 09:33

## 2024-08-20 ASSESSMENT — PAIN SCALES - GENERAL
PAINLEVEL_OUTOF10: 0 - NO PAIN
PAINLEVEL_OUTOF10: 0 - NO PAIN

## 2024-08-20 ASSESSMENT — PAIN - FUNCTIONAL ASSESSMENT
PAIN_FUNCTIONAL_ASSESSMENT: 0-10
PAIN_FUNCTIONAL_ASSESSMENT: 0-10

## 2024-08-20 ASSESSMENT — COGNITIVE AND FUNCTIONAL STATUS - GENERAL
STANDING UP FROM CHAIR USING ARMS: A LITTLE
WALKING IN HOSPITAL ROOM: A LITTLE
TURNING FROM BACK TO SIDE WHILE IN FLAT BAD: A LITTLE
MOBILITY SCORE: 18
CLIMB 3 TO 5 STEPS WITH RAILING: A LOT
MOVING TO AND FROM BED TO CHAIR: A LITTLE

## 2024-08-20 NOTE — NURSING NOTE
Wallace cath removed.  Pt tolerated well.  Wallace cath output charted via flowsheet.  Awaiting first void.  Will continue to monitor pt.

## 2024-08-20 NOTE — PROGRESS NOTES
08/20/24 1322   Discharge Planning   Expected Discharge Disposition  Services  ([facility])   Does the patient need discharge transport arranged? No   Patient Choice   Provider Choice list and CMS website (https://medicare.gov/care-compare#search) for post-acute Quality and Resource Measure Data were provided and reviewed with: Family;Patient   Patient / Family choosing to utilize agency / facility established prior to hospitalization Yes     SOC 24-48hrs    **Patient has a  safe discharge plan**

## 2024-08-20 NOTE — CARE PLAN
The patient's goals for the shift include stable vitals and labs    The clinical goals for the shift include safety    Problem: Skin  Goal: Decreased wound size/increased tissue granulation at next dressing change  Outcome: Progressing  Flowsheets (Taken 8/20/2024 1622)  Decreased wound size/increased tissue granulation at next dressing change: Promote sleep for wound healing  Goal: Participates in plan/prevention/treatment measures  Outcome: Progressing  Flowsheets (Taken 8/20/2024 1622)  Participates in plan/prevention/treatment measures:   Discuss with provider PT/OT consult   Increase activity/out of bed for meals   Elevate heels  Goal: Prevent/manage excess moisture  Outcome: Progressing  Flowsheets (Taken 8/20/2024 1622)  Prevent/manage excess moisture:   Cleanse incontinence/protect with barrier cream   Monitor for/manage infection if present  Goal: Prevent/minimize sheer/friction injuries  Outcome: Progressing  Flowsheets (Taken 8/20/2024 1622)  Prevent/minimize sheer/friction injuries:   Turn/reposition every 2 hours/use positioning/transfer devices   Use pull sheet   HOB 30 degrees or less  Goal: Promote/optimize nutrition  Outcome: Progressing  Flowsheets (Taken 8/20/2024 1622)  Promote/optimize nutrition: Monitor/record intake including meals  Goal: Promote skin healing  Outcome: Progressing  Flowsheets (Taken 8/20/2024 1622)  Promote skin healing: Turn/reposition every 2 hours/use positioning/transfer devices

## 2024-08-20 NOTE — PROGRESS NOTES
"Annetta Miguel is a 89 y.o. female on day 3 of admission presenting with Diarrhea.    Subjective   Seen for chronically stage V metabolic acidosis and hyponatremia patient is doing better no further diarrhea she is up in chair denies any shortness of breath Wallace catheter was removed about 4:30 in the morning however she has not voided yet feels like going to the bathroom however she can not       Objective     Physical Exam  Neck:      Vascular: No carotid bruit.   Cardiovascular:      Rate and Rhythm: Normal rate and regular rhythm.      Heart sounds: No murmur heard.     No friction rub. No gallop.   Pulmonary:      Breath sounds: No wheezing, rhonchi or rales.   Chest:      Chest wall: No tenderness.   Abdominal:      General: There is no distension.      Tenderness: There is no abdominal tenderness. There is no guarding or rebound.   Musculoskeletal:         General: No swelling or tenderness.      Cervical back: Neck supple.      Right lower leg: No edema.      Left lower leg: No edema.   Lymphadenopathy:      Cervical: No cervical adenopathy.         Last Recorded Vitals  Blood pressure 126/55, pulse 87, temperature 36.7 °C (98.1 °F), temperature source Oral, resp. rate 16, height 1.575 m (5' 2.01\"), weight 68.2 kg (150 lb 4.8 oz), SpO2 96%.    Intake/Output last 3 Shifts:  I/O last 3 completed shifts:  In: 2672.5 (39.2 mL/kg) [P.O.:1080; I.V.:1442.5 (21.2 mL/kg); IV Piggyback:150]  Out: 1550 (22.7 mL/kg) [Urine:1550 (0.6 mL/kg/hr)]  Weight: 68.2 kg     Current Facility-Administered Medications:     atorvastatin (Lipitor) tablet 10 mg, 10 mg, oral, Nightly, CLIFF Bedolla, 10 mg at 08/19/24 2118    calcitriol (Rocaltrol) capsule 0.5 mcg, 0.5 mcg, oral, Daily, Antione Littlejohn MD, 0.5 mcg at 08/20/24 0933    cefTRIAXone (Rocephin) 1 g in dextrose (iso) IV 50 mL, 1 g, intravenous, q24h, CLIFF Bedolla, Stopped at 08/20/24 1003    epoetin sabrina-epbx (Retacrit) injection 10,000 Units, " 10,000 Units, subcutaneous, Once per day on Monday Wednesday Friday, Antione Littlejohn MD, 10,000 Units at 08/19/24 1006    ergocalciferol (Vitamin D-2) capsule 1,250 mcg, 1,250 mcg, oral, Every Sunday, Antione Littlejohn MD, 1,250 mcg at 08/19/24 1305    ferrous sulfate (325 mg ferrous sulfate) tablet 1 tablet, 65 mg of iron, oral, Daily with breakfast, Karo BARRON AmusaHAI blackwood-CNP, 1 tablet at 08/20/24 0933    heparin (porcine) injection 5,000 Units, 5,000 Units, subcutaneous, q12h NOÉ, Karo BARRON Amusajaison APRANDRES-CNP, 5,000 Units at 08/20/24 0933    melatonin tablet 5 mg, 5 mg, oral, Nightly, Karo BARRON Amusat, APRANDRES-CNP, 5 mg at 08/19/24 2118    ondansetron (Zofran) injection 4 mg, 4 mg, intravenous, q8h PRN, Karo BARRON Amusat, APRN-CNP    pantoprazole (ProtoNix) EC tablet 40 mg, 40 mg, oral, Daily before breakfast, 40 mg at 08/20/24 0659 **OR** pantoprazole (ProtoNix) injection 40 mg, 40 mg, intravenous, Daily before breakfast, Karo BARRON Amusat, APRN-CNP    perflutren lipid microspheres (Definity) injection 0.5-10 mL of dilution, 0.5-10 mL of dilution, intravenous, Once in imaging, Karo Cowan APRN-CNP    perflutren protein A microsphere (Optison) injection 0.5 mL, 0.5 mL, intravenous, Once in imaging, Karo BARRON Amusat APRN-CNP    sevelamer carbonate (Renvela) tablet 800 mg, 800 mg, oral, TID, Karo Cowan APRN-CNP, 800 mg at 08/20/24 0933    sodium bicarbonate tablet 650 mg, 650 mg, oral, TID, Antione Littlejohn MD, 650 mg at 08/20/24 0933    sulfur hexafluoride microsphr (Lumason) injection 24.28 mg, 2 mL, intravenous, Once in imaging, Karo P Amusat, APRN-CNP   Relevant Results    Results for orders placed or performed during the hospital encounter of 08/17/24 (from the past 96 hour(s))   CBC and Auto Differential   Result Value Ref Range    WBC 23.1 (H) 4.4 - 11.3 x10*3/uL    nRBC 0.0 0.0 - 0.0 /100 WBCs    RBC 2.46 (L) 4.00 - 5.20 x10*6/uL    Hemoglobin 7.9 (L) 12.0 - 16.0 g/dL    Hematocrit 22.7 (L) 36.0 - 46.0 %    MCV 92 80 - 100 fL    MCH  32.1 26.0 - 34.0 pg    MCHC 34.8 32.0 - 36.0 g/dL    RDW 12.8 11.5 - 14.5 %    Platelets 130 (L) 150 - 450 x10*3/uL    Immature Granulocytes %, Automated 2.8 (H) 0.0 - 0.9 %    Immature Granulocytes Absolute, Automated 0.65 (H) 0.00 - 0.50 x10*3/uL   Comprehensive metabolic panel   Result Value Ref Range    Glucose 105 (H) 65 - 99 mg/dL    Sodium 126 (L) 133 - 145 mmol/L    Potassium 3.5 3.4 - 5.1 mmol/L    Chloride 93 (L) 97 - 107 mmol/L    Bicarbonate 14 (L) 24 - 31 mmol/L    Urea Nitrogen 65 (H) 8 - 25 mg/dL    Creatinine 4.40 (H) 0.40 - 1.60 mg/dL    eGFR 9 (L) >60 mL/min/1.73m*2    Calcium 5.8 (LL) 8.5 - 10.4 mg/dL    Albumin 3.0 (L) 3.5 - 5.0 g/dL    Alkaline Phosphatase 65 35 - 125 U/L    Total Protein 5.8 (L) 5.9 - 7.9 g/dL    AST 27 5 - 40 U/L    Bilirubin, Total 0.9 0.1 - 1.2 mg/dL    ALT 15 5 - 40 U/L    Anion Gap 19 <=19 mmol/L   Sars-CoV-2 PCR   Result Value Ref Range    Coronavirus 2019, PCR Not Detected Not Detected   Serial Troponin, Initial ([facility])   Result Value Ref Range    Troponin T, High Sensitivity 176 (HH) <=14 ng/L   Manual Differential   Result Value Ref Range    Neutrophils %, Manual 83.0 40.0 - 80.0 %    Bands %, Manual 10.0 0.0 - 5.0 %    Lymphocytes %, Manual 4.0 13.0 - 44.0 %    Monocytes %, Manual 2.0 2.0 - 10.0 %    Eosinophils %, Manual 0.0 0.0 - 6.0 %    Basophils %, Manual 0.0 0.0 - 2.0 %    Metamyelocytes %, Manual 1.0 0.0 - 0.0 %    Seg Neutrophils Absolute, Manual 19.17 (H) 1.60 - 5.00 x10*3/uL    Bands Absolute, Manual 2.31 (H) 0.00 - 0.50 x10*3/uL    Lymphocytes Absolute, Manual 0.92 0.80 - 3.00 x10*3/uL    Monocytes Absolute, Manual 0.46 0.05 - 0.80 x10*3/uL    Eosinophils Absolute, Manual 0.00 0.00 - 0.40 x10*3/uL    Basophils Absolute, Manual 0.00 0.00 - 0.10 x10*3/uL    Metamyelocytes Absolute, Manual 0.23 0.00 - 0.00 x10*3/uL    Total Cells Counted 100     Neutrophils Absolute, Manual 21.48 (H) 1.60 - 5.50 x10*3/uL    RBC Morphology See Below     Ovalocytes Few     John  Cells Many     Dohle Bodies Present     Vacuolated Neutrophils Present     Clumped Platelets Present    Magnesium   Result Value Ref Range    Magnesium 1.10 (L) 1.60 - 3.10 mg/dL   ECG 12 lead   Result Value Ref Range    Ventricular Rate 91 BPM    Atrial Rate 91 BPM    KS Interval 144 ms    QRS Duration 134 ms    QT Interval 430 ms    QTC Calculation(Bazett) 528 ms    P Axis 75 degrees    R Axis 70 degrees    T Axis 67 degrees    QRS Count 15 beats    Q Onset 216 ms    P Onset 144 ms    P Offset 196 ms    T Offset 431 ms    QTC Fredericia 494 ms   Urinalysis with Reflex Culture and Microscopic   Result Value Ref Range    Color, Urine Yellow Light-Yellow, Yellow, Dark-Yellow    Appearance, Urine Turbid (N) Clear    Specific Gravity, Urine 1.012 1.005 - 1.035    pH, Urine 6.0 5.0, 5.5, 6.0, 6.5, 7.0, 7.5, 8.0    Protein, Urine 300 (3+) (A) NEGATIVE, 10 (TRACE), 20 (TRACE) mg/dL    Glucose, Urine Normal Normal mg/dL    Blood, Urine 1.0 (3+) (A) NEGATIVE    Ketones, Urine NEGATIVE NEGATIVE mg/dL    Bilirubin, Urine NEGATIVE NEGATIVE    Urobilinogen, Urine Normal Normal mg/dL    Nitrite, Urine NEGATIVE NEGATIVE    Leukocyte Esterase, Urine 500 Jessica/µL (A) NEGATIVE   Extra Urine Gray Tube   Result Value Ref Range    Extra Tube Hold for add-ons.    Microscopic Only, Urine   Result Value Ref Range    WBC, Urine >50 (A) 1-5, NONE /HPF    WBC Clumps, Urine FEW Reference range not established. /HPF    RBC, Urine 6-10 (A) NONE, 1-2, 3-5 /HPF    Bacteria, Urine 2+ (A) NONE SEEN /HPF   Urine Culture    Specimen: Clean Catch/Voided; Urine   Result Value Ref Range    Urine Culture >100,000 Enteric bacilli (A)    Serial Troponin, 2 Hour ([facility])   Result Value Ref Range    Troponin T, High Sensitivity 157 (HH) <=14 ng/L   Blood Gas Lactic Acid, Venous   Result Value Ref Range    POCT Lactate, Venous 1.0 0.4 - 2.0 mmol/L   Blood Culture    Specimen: Peripheral Venipuncture; Blood culture   Result Value Ref Range    Blood Culture No  growth at 2 days    Blood Culture    Specimen: Peripheral Venipuncture; Blood culture   Result Value Ref Range    Blood Culture No growth at 2 days    Serial Troponin, 6 Hour ([facility])   Result Value Ref Range    Troponin T, High Sensitivity 135 (HH) <=14 ng/L   Comprehensive metabolic panel   Result Value Ref Range    Glucose 146 (H) 65 - 99 mg/dL    Sodium 127 (L) 133 - 145 mmol/L    Potassium 3.0 (L) 3.4 - 5.1 mmol/L    Chloride 95 (L) 97 - 107 mmol/L    Bicarbonate 13 (L) 24 - 31 mmol/L    Urea Nitrogen 62 (H) 8 - 25 mg/dL    Creatinine 3.70 (H) 0.40 - 1.60 mg/dL    eGFR 11 (L) >60 mL/min/1.73m*2    Calcium 5.5 (LL) 8.5 - 10.4 mg/dL    Albumin 2.6 (L) 3.5 - 5.0 g/dL    Alkaline Phosphatase 51 35 - 125 U/L    Total Protein 5.0 (L) 5.9 - 7.9 g/dL    AST 25 5 - 40 U/L    Bilirubin, Total 0.6 0.1 - 1.2 mg/dL    ALT 12 5 - 40 U/L    Anion Gap 19 <=19 mmol/L   CBC and Auto Differential   Result Value Ref Range    WBC 20.1 (H) 4.4 - 11.3 x10*3/uL    nRBC 0.0 0.0 - 0.0 /100 WBCs    RBC 2.43 (L) 4.00 - 5.20 x10*6/uL    Hemoglobin 7.7 (L) 12.0 - 16.0 g/dL    Hematocrit 22.5 (L) 36.0 - 46.0 %    MCV 93 80 - 100 fL    MCH 31.7 26.0 - 34.0 pg    MCHC 34.2 32.0 - 36.0 g/dL    RDW 12.7 11.5 - 14.5 %    Platelets 115 (L) 150 - 450 x10*3/uL    Immature Granulocytes %, Automated 1.6 (H) 0.0 - 0.9 %    Immature Granulocytes Absolute, Automated 0.32 0.00 - 0.50 x10*3/uL   Magnesium   Result Value Ref Range    Magnesium 3.10 1.60 - 3.10 mg/dL   Manual Differential   Result Value Ref Range    Neutrophils %, Manual 80.0 40.0 - 80.0 %    Bands %, Manual 13.0 0.0 - 5.0 %    Lymphocytes %, Manual 3.0 13.0 - 44.0 %    Monocytes %, Manual 2.0 2.0 - 10.0 %    Eosinophils %, Manual 0.0 0.0 - 6.0 %    Basophils %, Manual 0.0 0.0 - 2.0 %    Metamyelocytes %, Manual 2.0 0.0 - 0.0 %    Seg Neutrophils Absolute, Manual 16.08 (H) 1.60 - 5.00 x10*3/uL    Bands Absolute, Manual 2.61 (H) 0.00 - 0.50 x10*3/uL    Lymphocytes Absolute, Manual 0.60 (L) 0.80  - 3.00 x10*3/uL    Monocytes Absolute, Manual 0.40 0.05 - 0.80 x10*3/uL    Eosinophils Absolute, Manual 0.00 0.00 - 0.40 x10*3/uL    Basophils Absolute, Manual 0.00 0.00 - 0.10 x10*3/uL    Metamyelocytes Absolute, Manual 0.40 0.00 - 0.00 x10*3/uL    Total Cells Counted 100     Neutrophils Absolute, Manual 18.69 (H) 1.60 - 5.50 x10*3/uL    RBC Morphology See Below     Ovalocytes Few     [place] Cells Many     Dohle Bodies Present     Vacuolated Neutrophils Present    Hemoglobin A1c   Result Value Ref Range    Hemoglobin A1C 5.7 (H) See below %    Estimated Average Glucose 117 Not Established mg/dL   NT Pro-BNP   Result Value Ref Range    PROBNP 19,580 (H) 0 - 624 pg/mL   Calcium, ionized   Result Value Ref Range    POCT Calcium, Ionized 0.72 (LL) 1.1 - 1.33 mmol/L   Iron and TIBC   Result Value Ref Range    Iron <20 (L) 30 - 160 ug/dL    UIBC 121 110 - 370 ug/dL    TIBC      % Saturation     Ferritin   Result Value Ref Range    Ferritin 862 (H) 13 - 150 ng/mL   Folate   Result Value Ref Range    Folate, Serum 8.3 4.2 - 19.9 ng/mL   Comprehensive metabolic panel   Result Value Ref Range    Glucose 84 65 - 99 mg/dL    Sodium 126 (L) 133 - 145 mmol/L    Potassium 3.7 3.4 - 5.1 mmol/L    Chloride 93 (L) 97 - 107 mmol/L    Bicarbonate 13 (L) 24 - 31 mmol/L    Urea Nitrogen 66 (H) 8 - 25 mg/dL    Creatinine 3.90 (H) 0.40 - 1.60 mg/dL    eGFR 10 (L) >60 mL/min/1.73m*2    Calcium 7.4 (L) 8.5 - 10.4 mg/dL    Albumin 2.9 (L) 3.5 - 5.0 g/dL    Alkaline Phosphatase 54 35 - 125 U/L    Total Protein 5.1 (L) 5.9 - 7.9 g/dL    AST 30 5 - 40 U/L    Bilirubin, Total 0.5 0.1 - 1.2 mg/dL    ALT 15 5 - 40 U/L    Anion Gap >19 (H) <=19 mmol/L   Magnesium   Result Value Ref Range    Magnesium 1.60 1.60 - 3.10 mg/dL   PTH, Intact   Result Value Ref Range    Parathyroid Hormone, Intact 785.6 (H) 18.5 - 88.0 pg/mL   CBC and Auto Differential   Result Value Ref Range    WBC 16.4 (H) 4.4 - 11.3 x10*3/uL    nRBC 0.0 0.0 - 0.0 /100 WBCs    RBC 2.56  (L) 4.00 - 5.20 x10*6/uL    Hemoglobin 8.1 (L) 12.0 - 16.0 g/dL    Hematocrit 23.7 (L) 36.0 - 46.0 %    MCV 93 80 - 100 fL    MCH 31.6 26.0 - 34.0 pg    MCHC 34.2 32.0 - 36.0 g/dL    RDW 12.8 11.5 - 14.5 %    Platelets 126 (L) 150 - 450 x10*3/uL    Neutrophils % 92.0 40.0 - 80.0 %    Immature Granulocytes %, Automated 0.9 0.0 - 0.9 %    Lymphocytes % 3.5 13.0 - 44.0 %    Monocytes % 3.4 2.0 - 10.0 %    Eosinophils % 0.1 0.0 - 6.0 %    Basophils % 0.1 0.0 - 2.0 %    Neutrophils Absolute 15.10 (H) 1.60 - 5.50 x10*3/uL    Immature Granulocytes Absolute, Automated 0.15 0.00 - 0.50 x10*3/uL    Lymphocytes Absolute 0.58 (L) 0.80 - 3.00 x10*3/uL    Monocytes Absolute 0.56 0.05 - 0.80 x10*3/uL    Eosinophils Absolute 0.01 0.00 - 0.40 x10*3/uL    Basophils Absolute 0.02 0.00 - 0.10 x10*3/uL   Comprehensive metabolic panel   Result Value Ref Range    Glucose 74 65 - 99 mg/dL    Sodium 129 (L) 133 - 145 mmol/L    Potassium 4.0 3.4 - 5.1 mmol/L    Chloride 98 97 - 107 mmol/L    Bicarbonate 14 (L) 24 - 31 mmol/L    Urea Nitrogen 66 (H) 8 - 25 mg/dL    Creatinine 4.10 (H) 0.40 - 1.60 mg/dL    eGFR 10 (L) >60 mL/min/1.73m*2    Calcium 6.4 (L) 8.5 - 10.4 mg/dL    Albumin 2.7 (L) 3.5 - 5.0 g/dL    Alkaline Phosphatase 53 35 - 125 U/L    Total Protein 5.1 (L) 5.9 - 7.9 g/dL    AST 27 5 - 40 U/L    Bilirubin, Total 0.4 0.1 - 1.2 mg/dL    ALT 14 5 - 40 U/L    Anion Gap 17 <=19 mmol/L   Creatine Kinase   Result Value Ref Range    Creatine Kinase 348 (H) 24 - 195 U/L   POCT GLUCOSE   Result Value Ref Range    POCT Glucose 101 (H) 74 - 99 mg/dL   Vitamin D 25-Hydroxy,Total (for eval of Vitamin D levels)   Result Value Ref Range    Vitamin D, 25-Hydroxy, Total 14 (L) 31 - 100 ng/mL   POCT GLUCOSE   Result Value Ref Range    POCT Glucose 140 (H) 74 - 99 mg/dL   CBC and Auto Differential   Result Value Ref Range    WBC 13.0 (H) 4.4 - 11.3 x10*3/uL    nRBC 0.0 0.0 - 0.0 /100 WBCs    RBC 2.29 (L) 4.00 - 5.20 x10*6/uL    Hemoglobin 7.2 (L) 12.0  - 16.0 g/dL    Hematocrit 20.7 (L) 36.0 - 46.0 %    MCV 90 80 - 100 fL    MCH 31.4 26.0 - 34.0 pg    MCHC 34.8 32.0 - 36.0 g/dL    RDW 12.9 11.5 - 14.5 %    Platelets 113 (L) 150 - 450 x10*3/uL    Neutrophils % 91.3 40.0 - 80.0 %    Immature Granulocytes %, Automated 1.0 (H) 0.0 - 0.9 %    Lymphocytes % 4.5 13.0 - 44.0 %    Monocytes % 3.0 2.0 - 10.0 %    Eosinophils % 0.1 0.0 - 6.0 %    Basophils % 0.1 0.0 - 2.0 %    Neutrophils Absolute 11.88 (H) 1.60 - 5.50 x10*3/uL    Immature Granulocytes Absolute, Automated 0.13 0.00 - 0.50 x10*3/uL    Lymphocytes Absolute 0.59 (L) 0.80 - 3.00 x10*3/uL    Monocytes Absolute 0.39 0.05 - 0.80 x10*3/uL    Eosinophils Absolute 0.01 0.00 - 0.40 x10*3/uL    Basophils Absolute 0.01 0.00 - 0.10 x10*3/uL   Comprehensive metabolic panel   Result Value Ref Range    Glucose 129 (H) 65 - 99 mg/dL    Sodium 127 (L) 133 - 145 mmol/L    Potassium 3.6 3.4 - 5.1 mmol/L    Chloride 95 (L) 97 - 107 mmol/L    Bicarbonate 18 (L) 24 - 31 mmol/L    Urea Nitrogen 65 (H) 8 - 25 mg/dL    Creatinine 3.70 (H) 0.40 - 1.60 mg/dL    eGFR 11 (L) >60 mL/min/1.73m*2    Calcium 5.8 (LL) 8.5 - 10.4 mg/dL    Albumin 2.5 (L) 3.5 - 5.0 g/dL    Alkaline Phosphatase 53 35 - 125 U/L    Total Protein 4.8 (L) 5.9 - 7.9 g/dL    AST 19 5 - 40 U/L    Bilirubin, Total 0.3 0.1 - 1.2 mg/dL    ALT 13 5 - 40 U/L    Anion Gap 14 <=19 mmol/L   Creatine Kinase   Result Value Ref Range    Creatine Kinase 141 24 - 195 U/L   Phosphorus   Result Value Ref Range    Phosphorus 2.6 2.5 - 4.5 mg/dL   Transthoracic Echo (TTE) Complete   Result Value Ref Range    AV pk alfredo 1.53 m/s    LVOT diam 2.02 cm    AV mn grad 5.3 mmHg    MV E/A ratio 0.72     Tricuspid annular plane systolic excursion 3.0 cm    LV Biplane EF 69 %    LA vol index A/L 40.2 ml/m2    MV avg E/e' ratio 12.76     LV EF 60 %    RV free wall pk S' 18.05 cm/s    LVIDd 4.05 cm    AV pk grad 9.3 mmHg    Aortic Valve Area by Continuity of VTI 2.42 cm2    Aortic Valve Area by  Continuity of Peak Velocity 2.77 cm2    LV A4C EF 71.6    Creatine Kinase   Result Value Ref Range    Creatine Kinase 83 24 - 195 U/L   CBC and Auto Differential   Result Value Ref Range    WBC 11.4 (H) 4.4 - 11.3 x10*3/uL    nRBC 0.0 0.0 - 0.0 /100 WBCs    RBC 2.38 (L) 4.00 - 5.20 x10*6/uL    Hemoglobin 7.3 (L) 12.0 - 16.0 g/dL    Hematocrit 20.6 (L) 36.0 - 46.0 %    MCV 87 80 - 100 fL    MCH 30.7 26.0 - 34.0 pg    MCHC 35.4 32.0 - 36.0 g/dL    RDW 12.6 11.5 - 14.5 %    Platelets 106 (L) 150 - 450 x10*3/uL    Neutrophils % 87.7 40.0 - 80.0 %    Immature Granulocytes %, Automated 0.5 0.0 - 0.9 %    Lymphocytes % 6.6 13.0 - 44.0 %    Monocytes % 4.7 2.0 - 10.0 %    Eosinophils % 0.3 0.0 - 6.0 %    Basophils % 0.2 0.0 - 2.0 %    Neutrophils Absolute 9.98 (H) 1.60 - 5.50 x10*3/uL    Immature Granulocytes Absolute, Automated 0.06 0.00 - 0.50 x10*3/uL    Lymphocytes Absolute 0.75 (L) 0.80 - 3.00 x10*3/uL    Monocytes Absolute 0.54 0.05 - 0.80 x10*3/uL    Eosinophils Absolute 0.03 0.00 - 0.40 x10*3/uL    Basophils Absolute 0.02 0.00 - 0.10 x10*3/uL   Comprehensive metabolic panel   Result Value Ref Range    Glucose 111 (H) 65 - 99 mg/dL    Sodium 126 (L) 133 - 145 mmol/L    Potassium 3.7 3.4 - 5.1 mmol/L    Chloride 92 (L) 97 - 107 mmol/L    Bicarbonate 19 (L) 24 - 31 mmol/L    Urea Nitrogen 63 (H) 8 - 25 mg/dL    Creatinine 3.40 (H) 0.40 - 1.60 mg/dL    eGFR 12 (L) >60 mL/min/1.73m*2    Calcium 6.0 (L) 8.5 - 10.4 mg/dL    Albumin 2.4 (L) 3.5 - 5.0 g/dL    Alkaline Phosphatase 64 35 - 125 U/L    Total Protein 5.0 (L) 5.9 - 7.9 g/dL    AST 16 5 - 40 U/L    Bilirubin, Total 0.3 0.1 - 1.2 mg/dL    ALT 13 5 - 40 U/L    Anion Gap 15 <=19 mmol/L   Phosphorus   Result Value Ref Range    Phosphorus 2.4 (L) 2.5 - 4.5 mg/dL   Magnesium   Result Value Ref Range    Magnesium 1.50 (L) 1.60 - 3.10 mg/dL       Assessment/Plan   Chronic kidney stage V renal function stable however she has not voided since the catheter was removed we will  BladderScan her if she has high residual we will place the Wallace catheter back and obtain urology evaluation patient is absolutely refusing dialysis therapy if needed  Severe metabolic acidosis he was sodium bicarbonate tablets  Hyponatremia secondary to volume depletion sodium is stable  Hypomagnesemia secondary to GI loss with severe diarrhea it is back to normal  Hypocalcemia is secondary to hypomagnesemia and her chronic kidney disease show patient does have vitamin D deficiency vitamin D deficiency plan start vitamin D as well as calcium supplements  Acute urinary tract infection continue antibiotics  Anemia of chronic kidney disease  Secondary hyperparathyroidism will start her on calcitriol  Bilateral renal cysts                  Antione Littlejohn MD

## 2024-08-20 NOTE — DISCHARGE SUMMARY
Discharge Diagnosis  Diarrhea    Issues Requiring Follow-Up  Follow-up with primary care provider in 1 week  Renal function panel and magnesium in 1 week  Follow-up with Dr. Littlejohn in 2 weeks    Discharge Meds     Your medication list        START taking these medications        Instructions Last Dose Given Next Dose Due   cefuroxime 500 mg tablet  Commonly known as: Ceftin      Take 1 tablet (500 mg) by mouth 2 times a day for 4 days.       ferrous sulfate (325 mg ferrous sulfate) tablet  Start taking on: August 21, 2024      Take 1 tablet by mouth once daily with breakfast.              CONTINUE taking these medications        Instructions Last Dose Given Next Dose Due   amLODIPine 10 mg tablet  Commonly known as: Norvasc           atorvastatin 10 mg tablet  Commonly known as: Lipitor      TAKE 1 TABLET BY MOUTH EVERY DAY       calcitriol 0.25 mcg capsule  Commonly known as: Rocaltrol           polyethylene glycol 17 gram/dose powder  Commonly known as: Glycolax, Miralax           polyvinyl alcohol 1.4 % ophthalmic solution  Commonly known as: Liquifilm Tears           Restasis MultiDose 0.05 % drops  Generic drug: cycloSPORINE           sevelamer carbonate 800 mg tablet  Commonly known as: Renvela           sodium bicarbonate 650 mg tablet                  STOP taking these medications      furosemide 80 mg tablet  Commonly known as: Lasix        losartan 50 mg tablet  Commonly known as: Cozaar                  Where to Get Your Medications        These medications were sent to [facility]  [id number] [address] [id number]      Hours: 9 AM to 6 PM Mon-Fri, 9 AM to 1 PM Sat Phone: [phone]   cefuroxime 500 mg tablet  ferrous sulfate (325 mg ferrous sulfate) tablet         Test Results Pending At Discharge  Pending Labs       Order Current Status    Blood Culture Preliminary result    Blood Culture Preliminary result            Hospital Course     Annetta Miguel is a 89 y.o. female  presenting with diarrhea.     89 year old female with a PMHx significant for Chronic Kidney Disease IV, Hypertension, presents to [facility] for chief complaint of diarrhea and fatigue. Present bedside is the patient's adult daughter who assists with history.     Patient states symptoms began 3-4 days ago. She reports multiple episode of liquid diarrhea a day. She denies bloody or tarry stools. Denies fever. Endorses nausea/no vomiting and chills. She denies recent illness or sick contacts. States she is a resident of [facility] and commonly eats in close quarters with her fellow residents.     She states that this morning, she was in her senior-living facility apartment ambulating to the bathroom with her cane when she lost her balance, falling on her bottom. She denies hitting her head, denies any changes/loss of consciousness, denies associated chest pain/discomfort, diaphoresis, mental status changes--does not appear to be syncopal episode.  She states that her life alert necklace was nearby and was able to reach it to notify EMS.     Lab findings revealed multiple electrolyte imbalances, and abnormal coagulation profile.      High-sensitivity troponins elevated but flat pattern: 176, 157 and 135.  CT abdomen pelvis has been reviewed and reveals sigmoid diverticulosis without definitive diverticulitis, and findings that may be consistent with focal ileus or partial SBO.     Cardiology, nephrology and general surgery services have been consulted. Will admit under hospitalist service for further management.     Patient is now stage V kidney disease.  She refuses hemodialysis at any point.  She states that she may start palliative care but does not want to speak with them at the present time.  She is DNR, DNI, no ICU.  Electrolytes are stable except for magnesium was 1.5 which was replaced with 2 g today.  She will follow-up with Dr. Littlejohn in 2 weeks and have renal function  panel and magnesium drawn in 1 week.  Her hemoglobin is stable.  Will discharge home with home health care today       Pertinent Physical Exam At Time of Discharge  Physical Exam  Constitutional: No acute distress, calm, cooperative  Cardiovascular: Regular rhythm and rate,   Respiratory: Lungs clear to auscultation,   Gastrointestinal: Bowel sounds positive x 4, soft, nontender  Neurologic: Alert and oriented x 3 equal strength bilaterally  Musculoskeletal: Able to move all extremities, no edema  Skin: Warm, dry and intact      Outpatient Follow-Up  Future Appointments   Date Time Provider Department Center   10/28/2024  1:30 PM Renae Bhakta MD [id number]         Boni Stevens, APRN-CNP

## 2024-08-20 NOTE — CARE PLAN
The patient's goals for the shift include stable vitals and labs    The clinical goals for the shift include safety      Problem: Skin  Goal: Decreased wound size/increased tissue granulation at next dressing change  8/19/2024 2133 by Luly Gonzales RN  Outcome: Progressing  8/19/2024 2133 by Luly Gonzaels RN  Outcome: Progressing  Goal: Participates in plan/prevention/treatment measures  8/19/2024 2133 by Luly Gonzales RN  Outcome: Progressing  8/19/2024 2133 by Luly Gonzales RN  Outcome: Progressing  Goal: Prevent/manage excess moisture  8/19/2024 2133 by Luly Gonzales RN  Outcome: Progressing  8/19/2024 2133 by Luly Gonzales RN  Outcome: Progressing  Goal: Prevent/minimize sheer/friction injuries  8/19/2024 2133 by Luly Gonzales RN  Outcome: Progressing  8/19/2024 2133 by Luly Gonzales RN  Outcome: Progressing  Goal: Promote/optimize nutrition  8/19/2024 2133 by Luly Gonzales RN  Outcome: Progressing  8/19/2024 2133 by Luly Gonzales RN  Outcome: Progressing  Goal: Promote skin healing  8/19/2024 2133 by Luly Gonzales RN  Outcome: Progressing  8/19/2024 2133 by Luly Gonzales RN  Outcome: Progressing     Problem: Fall/Injury  Goal: Not fall by end of shift  8/19/2024 2133 by Luly Gonzales RN  Outcome: Progressing  8/19/2024 2133 by Luly Gonzales RN  Outcome: Progressing  Goal: Be free from injury by end of the shift  8/19/2024 2133 by Luly Gonzales RN  Outcome: Progressing  8/19/2024 2133 by Luly Gonzales RN  Outcome: Progressing  Goal: Verbalize understanding of personal risk factors for fall in the hospital  8/19/2024 2133 by Luly Gonzales RN  Outcome: Progressing  8/19/2024 2133 by Luly Gonzales RN  Outcome: Progressing  Goal: Verbalize understanding of risk factor reduction measures to prevent injury from fall in the home  8/19/2024 2133 by Luly S Christian, RN  Outcome: Progressing  8/19/2024 2133 by Luly Gonzales, RN  Outcome:  Progressing  Goal: Use assistive devices by end of the shift  8/19/2024 2133 by Luly Gonzales RN  Outcome: Progressing  8/19/2024 2133 by Luly Gonzales RN  Outcome: Progressing  Goal: Pace activities to prevent fatigue by end of the shift  8/19/2024 2133 by Luly Gonzales RN  Outcome: Progressing  8/19/2024 2133 by Luly Gonzales RN  Outcome: Progressing     Problem: Dressings Lower Extremities  Goal: LTG - Patient will utilize adaptive techniques/equipment to dress lower body Mod I  8/19/2024 2133 by Luly Gonzales RN  Outcome: Progressing  8/19/2024 2133 by Luly Gonzales RN  Outcome: Progressing     Problem: Toileting  Goal: LTG - Patient will complete daily toileting tasks Mod I   8/19/2024 2133 by Luly Gonzales RN  Outcome: Progressing  8/19/2024 2133 by Luly Gonzales RN  Outcome: Progressing     Problem: Nutrition  Goal: Oral intake greater 75%  8/19/2024 2133 by Luly Gonzales RN  Outcome: Progressing  8/19/2024 2133 by Luly Gonzales RN  Outcome: Progressing

## 2024-08-20 NOTE — NURSING NOTE
Assumed care of pt. Pt is awake lying in bed, pt denies pain, on RA, HR is 88 SR on tele, bedside shift report given by KIRSTEN Mauricio, bed locked and lowered, call light w/in reach.

## 2024-08-20 NOTE — PROGRESS NOTES
Marcos Miguel is a 89 y.o. female on day 3 of admission presenting with Diarrhea.      Subjective   Patient examined sitting up in chair with daughter in room no complaints of pain.  Patient states that she is starting to eat a little bit better.       Objective     Last Recorded Vitals  /59 (BP Location: Left arm, Patient Position: Sitting)   Pulse 86   Temp 36.6 °C (97.9 °F) (Oral)   Resp 16   Wt 68.2 kg (150 lb 4.8 oz)   SpO2 97%   Intake/Output last 3 Shifts:    Intake/Output Summary (Last 24 hours) at 8/20/2024 1313  Last data filed at 8/20/2024 1246  Gross per 24 hour   Intake 2190 ml   Output 1300 ml   Net 890 ml       Admission Weight  Weight: 63.5 kg (140 lb) (08/17/24 0935)    Daily Weight  08/20/24 : 68.2 kg (150 lb 4.8 oz)    Image Results  XR knee right 4+ views  Narrative: Interpreted By:  Obi Carrera,   STUDY:  XR KNEE RIGHT 4+ VIEWS; 8/18/2024 4:12 pm      INDICATION:  Signs/Symptoms:right knee lesion      COMPARISON:  None.      ACCESSION NUMBER(S):  [id number]      ORDERING CLINICIAN:  MIGUEL A SCOTT      TECHNIQUE:  Number of films: Five view radiographs of the right knee.      FINDINGS:  No fractures or destructive lesions are identified. Marked  degenerative changes are again present involving the right knee  joint, especially the lateral compartment, with joint space  narrowing, subchondral bone sclerosis, subchondral cysts and  osteophytes. The alignment is anatomic. Vascular calcifications are  present. There is small effusion in the suprapatellar bursa.      Impression: Extensive osteoarthritis, mostly laterally and small suprapatellar  joint effusion.      Signed by: Obi Carrera 8/19/2024 11:22 AM  Dictation workstation:   [id number]  Transthoracic Echo (TTE) [place] [address]             Phone [phone]    TRANSTHORACIC ECHOCARDIOGRAM REPORT    Patient Name:      MARCOS RAJAN               Reading Physician:    [id number] Neville Centeno MD  Study Date:        8/19/2024              Ordering Provider:    [id number] MIGUEL A SCOTT  MRN/PID:           [id number]               Fellow:  Accession#:        [id number]           Nurse:  Date of Birth/Age: 6/4/1930 / 89 years    Sonographer:          Juli George  Gender:            F                      Additional Staff:  Height:            157.48 cm              Admit Date:  Weight:            65.77 kg               Admission Status:  BSA / BMI:         1.67 m2 / 26.52 kg/m2  Department Location:  [facility]  Blood Pressure: 106 /42 mmHg    Study Type:    TRANSTHORACIC ECHO (TTE) COMPLETE  Diagnosis/ICD: Elevated Troponin-R79.89  CPT Codes:     Echo Complete w Full [id number]    Patient History:  Pertinent History: HTN, Hyperlipidemia and CKD.    Study Detail: The following Echo studies were performed: 3D, Strain, color flow,                Doppler, M-Mode and 2D.       PHYSICIAN INTERPRETATION:  Left Ventricle: Left ventricular ejection fraction is normal, by visual estimate at 60%. There are no regional wall motion abnormalities. The left ventricular cavity size is normal. There is mild left ventricular hypertrophy involving the basal wall and septal wall. Spectral Doppler shows an impaired relaxation pattern of left ventricular diastolic filling.  Left Atrium: The left atrium is mildly dilated.  Right Ventricle: The right ventricle is upper limits of normal in size. There is normal right ventricular global systolic function.  Right Atrium: The right atrium is normal in size.  Aortic Valve: The aortic valve is trileaflet. There is mild aortic valve cusp calcification. There is mild aortic valve thickening. There is evidence of mildly elevated transaortic gradients consistent with sclerosis of the aortic valve.  The aortic valve  dimensionless index is 0.76. There is trace aortic valve regurgitation. The peak instantaneous gradient of the aortic valve is 9.3 mmHg. The mean gradient of the aortic valve is 5.3 mmHg.  Mitral Valve: The mitral valve is mildly thickened. There is mild mitral valve regurgitation.  Tricuspid Valve: The tricuspid valve is structurally normal. There is trace tricuspid regurgitation.  Pulmonic Valve: The pulmonic valve is structurally normal. There is no indication of pulmonic valve regurgitation.  Pericardium: There is no pericardial effusion noted.  Aorta: The aortic root was not assessed.       CONCLUSIONS:   1. Left ventricular ejection fraction is normal, by visual estimate at 60%.   2. Spectral Doppler shows an impaired relaxation pattern of left ventricular diastolic filling.   3. There is normal right ventricular global systolic function.   4. Aortic valve sclerosis.    QUANTITATIVE DATA SUMMARY:  2D MEASUREMENTS:                           Normal Ranges:  IVSd:          0.93 cm   (0.6-1.1cm)  LVPWd:         0.96 cm   (0.6-1.1cm)  LVIDd:         4.05 cm   (3.9-5.9cm)  LVIDs:         2.18 cm  LV Mass Index: 72.0 g/m2  LV % FS        46.3 %    LA VOLUME:                               Normal Ranges:  LA Vol A4C:       61.2 ml    (22+/-6mL/m2)  LA Vol A2C:       71.0 ml  LA Vol BP:        67.1 ml  LA Vol Index A4C: 36.7 ml/m2  LA Vol Index A2C: 42.6 ml/m2  LA Vol Index BP:  40.2 ml/m2  LA Vol A4C:       58.9 ml  LA Vol A2C:       67.4 ml  LA Vol Index BSA: 37.9 ml/m2    RA VOLUME BY A/L METHOD:                        Normal Ranges:  RA Area A4C: 14.0 cm2    LV SYSTOLIC FUNCTION BY 2D PLANIMETRY (MOD):                       Normal Ranges:  EF-A4C View:    72 % (>=55%)  EF-A2C View:    68 %  EF-Biplane:     69 %  EF-Visual:      60 %  EF-Auto:        59 %  LV EF Reported: 60 %    LV DIASTOLIC FUNCTION:                          Normal Ranges:  MV Peak E:    0.91 m/s  (0.7-1.2 m/s)  MV Peak A:    1.26 m/s  (0.42-0.7  m/s)  E/A Ratio:    0.72      (1.0-2.2)  MV e'         0.071 m/s (>8.0)  MV lateral e' 0.07 m/s  MV medial e'  0.07 m/s  E/e' Ratio:   12.76     (<8.0)    MITRAL VALVE:                  Normal Ranges:  MV DT: 261 msec (150-240msec)    AORTIC VALVE:                                    Normal Ranges:  AoV Vmax:                1.53 m/s (<=1.7m/s)  AoV Peak P.3 mmHg (<20mmHg)  AoV Mean P.3 mmHg (1.7-11.5mmHg)  LVOT Max José Antonio:            1.33 m/s (<=1.1m/s)  AoV VTI:                 30.73 cm (18-25cm)  LVOT VTI:                23.27 cm  LVOT Diameter:           2.02 cm  (1.8-2.4cm)  AoV Area, VTI:           2.42 cm2 (2.5-5.5cm2)  AoV Area,Vmax:           2.77 cm2 (2.5-4.5cm2)  AoV Dimensionless Index: 0.76       RIGHT VENTRICLE:  RV Basal 4.32 cm  TAPSE:   30.5 mm  RV s'    0.18 m/s    TRICUSPID VALVE/RVSP:                    Normal Ranges:  IVC Diam: 2.21 cm       [id number] Neville Centeno MD  Electronically signed on 2024 at 10:25:19 AM       ** Final **  ECG 12 lead  Normal sinus rhythm  Possible Left atrial enlargement  Right bundle branch block  Abnormal ECG  No previous ECGs available      Physical Exam  Constitutional: No acute distress, calm, cooperative  HEENT: PERRL, normocephalic, atraumatic, mucous membranes moist  Cardiovascular: Regular rhythm and rate,   Respiratory: Lungs clear to auscultation,   Gastrointestinal: Bowel sounds positive x 4, soft, nontender  Neurologic: Alert and oriented x 3 equal strength bilaterally  Musculoskeletal: Able to move all extremities, no edema  Skin: Warm, dry and intact  Relevant Results               Assessment/Plan              Gastroenteritis  CT showed dilated small bowel loop  Diarrhea likely due to enteritis per general surgery  Advance to regular diet today  PT OT following  Care management to set up home with home health care     Acute dehydration-resolved  Hyponatremia, hypomagnesia, hypocalcemia  Nephrology following, replace electrolyte  imbalances  Follow electrolytes     Acute urinary retention  DC Wallace  Monitor I's and O  Bladder scan as needed     Acute on chronic anemia, thrombocytopenia  Hemoglobin stable  Nephrology following  Monitor CBC  Transfuse for hemoglobin less than 7     Acute on chronic kidney disease stage IV  Patient refuses hemodialysis under any circumstances  Nephrology following  Monitor renal function     Leukocytosis-resolved     Pyuria-resolved     Small right mastoid effusion  Confirmed on CT of the head  Continue antibiotics     Basilar infiltrate and effusion  Encourage I-S  Continue to monitor  Breathing comfortably on room air     Bilateral lower extremity edema-resolved     Hypertension  Continue home medication     Disposition  She is ready for discharge once home health care is set up    Assessment & Plan  Diarrhea                  Boni Stevens, APRN-CNP

## 2024-08-20 NOTE — PROGRESS NOTES
Physical Therapy    Physical Therapy Treatment    Patient Name: Annetta Miguel  MRN: [id number]  Today's Date: 8/20/2024  Time Calculation  Start Time: 1410  Stop Time: 1435  Time Calculation (min): 25 min    Assessment/Plan   PT Assessment  End of Session Communication: Bedside nurse  Assessment Comment: pt demonstrates safe mobility with use of FWWm close supervision of 1, on level surfaces. Improved transfer afety; Required assist for austin LE's into bed; Daughter to stay with pt for 2 days, 24/7; recommend d/c home with low int rehab and use of FWW at this time.  End of Session Patient Position: Bed, 3 rail up, Alarm on (call button in reach)  PT Plan  Inpatient/Swing Bed or Outpatient: Inpatient  PT Plan  Treatment/Interventions: Bed mobility, Transfer training, Gait training, Balance training, Strengthening, Endurance training, Therapeutic exercise, Therapeutic activity  PT Plan: Ongoing PT  PT Frequency: 4 times per week  PT Discharge Recommendations: Low intensity level of continued care  Equipment Recommended upon Discharge: Wheeled walker  PT Recommended Transfer Status: Stand by assist, Assistive device (FWW)  PT - OK to Discharge: Yes      General Visit Information:   PT  Visit  PT Received On: 08/20/24  General  Family/Caregiver Present: Yes  Caregiver Feedback: Dtr at bedside  Prior to Session Communication: Bedside nurse  Patient Position Received:  (pt on commode in bathroom)  Preferred Learning Style: verbal, visual  General Comment: cleared by nurse for therapy; pt agreeable to therapy; requesting off of commode    Subjective   Precautions:  Precautions  Hearing/Visual Limitations: glasses for reading, hearing grossly WFL  Medical Precautions: Fall precautions  Vital Signs:  Vital Signs  Heart Rate: 106  SpO2: 99 %  Patient Position: Sitting    Objective   Pain:  Pain Assessment  Pain Assessment: 0-10  0-10 (Numeric) Pain Score: 0 - No pain  Cognition:  Cognition  Overall Cognitive Status:  Within Functional Limits  Orientation Level: Oriented X4  Safety/Judgement:  (decreased initial safety judgement during transfers; after verbal teaching from PT, pt consistant with safe transfer technique)  Coordination:  Movements are Fluid and Coordinated: Yes  Heel to Bridges: Intact (age appropriate)  Postural Control:  Postural Control  Posture Comment: mild forward head, protracted shldrs  Extremity/Trunk Assessments:    Activity Tolerance:  Activity Tolerance  Endurance: Decreased tolerance for upright activites  Activity Tolerance Comments: fair +  Treatments:  Therapeutic Exercise  Therapeutic Exercise Performed: No    Therapeutic Activity  Therapeutic Activity Performed: Yes (see bed mobility, transfers, amb with FWW comments)    Bed Mobility  Bed Mobility: Yes  Bed Mobility 1  Bed Mobility 1: Sitting to supine  Level of Assistance 1: Moderate assistance, Minimal verbal cues  Bed Mobility Comments 1: head of bed slightly elevated; mod assist of 1 for austin LE's onto bed, verbal cues for ease of transition    Ambulation/Gait Training  Ambulation/Gait Training Performed: Yes  Ambulation/Gait Training 1  Surface 1: Level tile  Device 1: Rolling walker  Assistance 1: Close supervision, Minimal verbal cues  Quality of Gait 1: Diminished heel strike  Comments/Distance (ft) 1: pt amb 44 ft x 2, FWW, + turns, verbal cues for staying close to frame of FWW at all times; Overall balance good; o2 sat 98% with  bpm  Transfers  Transfer: Yes  Transfer 1  Transfer From 1: Sit to  Transfer to 1: Stand  Technique 1: Sit to stand  Transfer Device 1: Walker  Transfer Level of Assistance 1: Close supervision, Minimal verbal cues  Trials/Comments 1: close supervision of 1 for trunk up, verbal cues for proper austin hand placement on bed and/or right grabbar/commode  Transfers 2  Transfer From 2: Stand to  Transfer to 2: Sit  Technique 2: Stand to sit  Transfer Device 2: Walker  Transfer Level of Assistance 2: Close supervision,  Minimal verbal cues  Trials/Comments 2: close supervision of 1 for trunk down, verbal cues for reaching back with both hands for bed    Stairs  Stairs: No    Other Activity  Other Activity Performed: Yes  Other Activity 1: PT fit and provided pt with FWW for safe home amb.    Outcome Measures:  [facility] Basic Mobility  Turning from your back to your side while in a flat bed without using bedrails: None  Moving from lying on your back to sitting on the side of a flat bed without using bedrails: A little  Moving to and from bed to chair (including a wheelchair): A little  Standing up from a chair using your arms (e.g. wheelchair or bedside chair): A little  To walk in hospital room: A little  Climbing 3-5 steps with railing: A lot  Basic Mobility - Total Score: 18    Education Documentation    Mobility Training, taught by Jesi Terry, PT at 8/20/2024  2:52 PM.  Learner: Patient  Readiness: Acceptance  Method: Explanation, Demonstration  Response: Verbalizes Understanding    Education Comments  No comments found.               Encounter Problems       Encounter Problems (Active)       Mobility       STG - Patient will ambulate 100' with rolling walker and distant supervision (Progressing)       Start:  08/18/24    Expected End:  09/15/24               PT Transfers       STG - Transfer from bed to chair distant supervision (Progressing)       Start:  08/18/24    Expected End:  09/15/24            STG - Patient to transfer to and from sit to supine distant supervision (Progressing)       Start:  08/18/24    Expected End:  09/15/24            STG - Patient will transfer sit to and from stand distant supervision. (Progressing)       Start:  08/18/24    Expected End:  09/15/24

## 2024-08-21 ENCOUNTER — PATIENT OUTREACH (OUTPATIENT)
Dept: PRIMARY CARE | Facility: CLINIC | Age: 89
End: 2024-08-21
Payer: MEDICARE

## 2024-08-21 LAB
BACTERIA BLD CULT: NORMAL
BACTERIA BLD CULT: NORMAL

## 2024-08-21 NOTE — PROGRESS NOTES
Discharge Facility: [facility]  Discharge Diagnosis: Diarrhea   Admission Date: 8/17/24  Discharge Date: 8/20/24    PCP Appointment Date: 8/27/24  Specialist Appointment Date: Unknown  Hospital Encounter and Summary Linked: Yes    See discharge assessment below for further details    Engagement  Call Start Time: 0925 (8/21/2024  9:27 AM)    Medications  Medications reviewed with patient/caregiver?: Yes (8/21/2024  9:27 AM)  Is the patient having any side effects they believe may be caused by any medication additions or changes?: No (8/21/2024  9:27 AM)  Does the patient have all medications ordered at discharge?: Yes (8/21/2024  9:27 AM)  Care Management Interventions: No intervention needed (8/21/2024  9:27 AM)  Prescription Comments: pt sent home with script (8/21/2024  9:27 AM)  Is the patient taking all medications as directed (includes completed medication regime)?: Yes (8/21/2024  9:27 AM)  Care Management Interventions: Provided patient education (8/21/2024  9:27 AM)  Medication Comments: Pt denies problems obtaining or affording medication (8/21/2024  9:27 AM)    Appointments  Does the patient have a primary care provider?: Yes (8/21/2024  9:27 AM)  Care Management Interventions: Verified appointment date/time/provider (8/21/2024  9:27 AM)  Has the patient kept scheduled appointments due by today?: Yes (8/21/2024  9:27 AM)  Care Management Interventions: Advised patient to keep appointment (8/21/2024  9:27 AM)    Self Management  What is the home health agency?: n/a (8/21/2024  9:27 AM)  Has home health visited the patient within 72 hours of discharge?: Not applicable (8/21/2024  9:27 AM)    Patient Teaching  Does the patient have access to their discharge instructions?: Yes (8/21/2024  9:27 AM)  Care Management Interventions: Reviewed instructions with patient (8/21/2024  9:27 AM)  What is the patient's perception of their health status since discharge?: Improving (8/21/2024  9:27 AM)  Is  the patient/caregiver able to teach back the hierarchy of who to call/visit for symptoms/problems? PCP, Specialist, Home Health nurse, Urgent Care, ED, 911: Yes (8/21/2024  9:27 AM)    Wrap Up  Wrap Up Additional Comments: This CM spoke with pt via phone. Pt reports doing well at home since discharge. New meds reviewed. Pt denies CP and SOB. Pt aware of my availability for non-emergent concerns. Contact info provided to patient (8/21/2024  9:27 AM)      Jesse Guerra LPN

## 2024-08-22 ENCOUNTER — APPOINTMENT (OUTPATIENT)
Dept: RADIOLOGY | Facility: HOSPITAL | Age: 89
End: 2024-08-22
Payer: MEDICARE

## 2024-08-22 ENCOUNTER — HOSPITAL ENCOUNTER (INPATIENT)
Facility: HOSPITAL | Age: 89
LOS: 2 days | Discharge: HOSPICE | End: 2024-08-24
Attending: EMERGENCY MEDICINE | Admitting: NURSE PRACTITIONER
Payer: MEDICARE

## 2024-08-22 DIAGNOSIS — J18.9 PNEUMONIA OF RIGHT LOWER LOBE DUE TO INFECTIOUS ORGANISM: ICD-10-CM

## 2024-08-22 DIAGNOSIS — R53.1 GENERALIZED WEAKNESS: ICD-10-CM

## 2024-08-22 DIAGNOSIS — N18.5 CHRONIC KIDNEY DISEASE, STAGE V (MULTI): ICD-10-CM

## 2024-08-22 DIAGNOSIS — E87.1 HYPONATREMIA: Primary | ICD-10-CM

## 2024-08-22 DIAGNOSIS — N18.4 STAGE 4 CHRONIC KIDNEY DISEASE (MULTI): ICD-10-CM

## 2024-08-22 DIAGNOSIS — E83.51 HYPOCALCEMIA: ICD-10-CM

## 2024-08-22 PROBLEM — D50.0 IRON DEFICIENCY ANEMIA DUE TO CHRONIC BLOOD LOSS: Chronic | Status: ACTIVE | Noted: 2024-08-22

## 2024-08-22 PROBLEM — I10 PRIMARY HYPERTENSION: Chronic | Status: ACTIVE | Noted: 2024-08-22

## 2024-08-22 PROBLEM — E78.2 MIXED HYPERLIPIDEMIA: Status: ACTIVE | Noted: 2023-08-25

## 2024-08-22 PROBLEM — Z79.899 ON DEEP VEIN THROMBOSIS (DVT) PROPHYLAXIS: Status: ACTIVE | Noted: 2024-08-22

## 2024-08-22 PROBLEM — F41.9 ANXIETY: Chronic | Status: ACTIVE | Noted: 2024-08-22

## 2024-08-22 LAB
ALBUMIN SERPL-MCNC: 2.8 G/DL (ref 3.5–5)
ALP BLD-CCNC: 74 U/L (ref 35–125)
ALT SERPL-CCNC: 15 U/L (ref 5–40)
ANION GAP SERPL CALC-SCNC: 13 MMOL/L
APTT PPP: 22.2 SECONDS (ref 22–32.5)
AST SERPL-CCNC: 17 U/L (ref 5–40)
ATRIAL RATE: 85 BPM
ATRIAL RATE: 91 BPM
BASOPHILS # BLD AUTO: 0.02 X10*3/UL (ref 0–0.1)
BASOPHILS NFR BLD AUTO: 0.1 %
BILIRUB SERPL-MCNC: 0.3 MG/DL (ref 0.1–1.2)
BUN SERPL-MCNC: 59 MG/DL (ref 8–25)
CALCIUM SERPL-MCNC: 5.8 MG/DL (ref 8.5–10.4)
CHLORIDE SERPL-SCNC: 90 MMOL/L (ref 97–107)
CO2 SERPL-SCNC: 19 MMOL/L (ref 24–31)
CREAT SERPL-MCNC: 3.3 MG/DL (ref 0.4–1.6)
EGFRCR SERPLBLD CKD-EPI 2021: 12 ML/MIN/1.73M*2
EOSINOPHIL # BLD AUTO: 0.09 X10*3/UL (ref 0–0.4)
EOSINOPHIL NFR BLD AUTO: 0.7 %
ERYTHROCYTE [DISTWIDTH] IN BLOOD BY AUTOMATED COUNT: 12.5 % (ref 11.5–14.5)
GLUCOSE SERPL-MCNC: 107 MG/DL (ref 65–99)
HCT VFR BLD AUTO: 22.2 % (ref 36–46)
HGB BLD-MCNC: 7.8 G/DL (ref 12–16)
IMM GRANULOCYTES # BLD AUTO: 0.25 X10*3/UL (ref 0–0.5)
IMM GRANULOCYTES NFR BLD AUTO: 1.8 % (ref 0–0.9)
INR PPP: 1.1 (ref 0.9–1.2)
LACTATE BLDV-SCNC: 0.7 MMOL/L (ref 0.4–2)
LYMPHOCYTES # BLD AUTO: 0.68 X10*3/UL (ref 0.8–3)
LYMPHOCYTES NFR BLD AUTO: 5 %
MCH RBC QN AUTO: 31.2 PG (ref 26–34)
MCHC RBC AUTO-ENTMCNC: 35.1 G/DL (ref 32–36)
MCV RBC AUTO: 89 FL (ref 80–100)
MONOCYTES # BLD AUTO: 0.58 X10*3/UL (ref 0.05–0.8)
MONOCYTES NFR BLD AUTO: 4.2 %
NEUTROPHILS # BLD AUTO: 12.07 X10*3/UL (ref 1.6–5.5)
NEUTROPHILS NFR BLD AUTO: 88.2 %
NRBC BLD-RTO: 0 /100 WBCS (ref 0–0)
P AXIS: 75 DEGREES
P AXIS: 79 DEGREES
P OFFSET: 196 MS
P OFFSET: 197 MS
P ONSET: 141 MS
P ONSET: 144 MS
PLATELET # BLD AUTO: 193 X10*3/UL (ref 150–450)
POTASSIUM SERPL-SCNC: 4 MMOL/L (ref 3.4–5.1)
PR INTERVAL: 144 MS
PR INTERVAL: 154 MS
PROT SERPL-MCNC: 5.3 G/DL (ref 5.9–7.9)
PROTHROMBIN TIME: 11.4 SECONDS (ref 9.3–12.7)
Q ONSET: 216 MS
Q ONSET: 218 MS
QRS COUNT: 14 BEATS
QRS COUNT: 15 BEATS
QRS DURATION: 122 MS
QRS DURATION: 134 MS
QT INTERVAL: 430 MS
QT INTERVAL: 436 MS
QTC CALCULATION(BAZETT): 518 MS
QTC CALCULATION(BAZETT): 528 MS
QTC FREDERICIA: 489 MS
QTC FREDERICIA: 494 MS
R AXIS: 55 DEGREES
R AXIS: 70 DEGREES
RBC # BLD AUTO: 2.5 X10*6/UL (ref 4–5.2)
SODIUM SERPL-SCNC: 122 MMOL/L (ref 133–145)
T AXIS: 64 DEGREES
T AXIS: 67 DEGREES
T OFFSET: 431 MS
T OFFSET: 436 MS
VENTRICULAR RATE: 85 BPM
VENTRICULAR RATE: 91 BPM
WBC # BLD AUTO: 13.7 X10*3/UL (ref 4.4–11.3)

## 2024-08-22 PROCEDURE — 96360 HYDRATION IV INFUSION INIT: CPT | Mod: 59

## 2024-08-22 PROCEDURE — 84075 ASSAY ALKALINE PHOSPHATASE: CPT | Performed by: EMERGENCY MEDICINE

## 2024-08-22 PROCEDURE — 74018 RADEX ABDOMEN 1 VIEW: CPT | Performed by: RADIOLOGY

## 2024-08-22 PROCEDURE — 93010 ELECTROCARDIOGRAM REPORT: CPT | Performed by: INTERNAL MEDICINE

## 2024-08-22 PROCEDURE — 2500000004 HC RX 250 GENERAL PHARMACY W/ HCPCS (ALT 636 FOR OP/ED): Performed by: STUDENT IN AN ORGANIZED HEALTH CARE EDUCATION/TRAINING PROGRAM

## 2024-08-22 PROCEDURE — 74018 RADEX ABDOMEN 1 VIEW: CPT

## 2024-08-22 PROCEDURE — 85025 COMPLETE CBC W/AUTO DIFF WBC: CPT | Performed by: EMERGENCY MEDICINE

## 2024-08-22 PROCEDURE — 1200000002 HC GENERAL ROOM WITH TELEMETRY DAILY

## 2024-08-22 PROCEDURE — 71045 X-RAY EXAM CHEST 1 VIEW: CPT | Performed by: RADIOLOGY

## 2024-08-22 PROCEDURE — 2500000004 HC RX 250 GENERAL PHARMACY W/ HCPCS (ALT 636 FOR OP/ED): Performed by: EMERGENCY MEDICINE

## 2024-08-22 PROCEDURE — 2500000002 HC RX 250 W HCPCS SELF ADMINISTERED DRUGS (ALT 637 FOR MEDICARE OP, ALT 636 FOR OP/ED): Performed by: NURSE PRACTITIONER

## 2024-08-22 PROCEDURE — 83605 ASSAY OF LACTIC ACID: CPT | Performed by: EMERGENCY MEDICINE

## 2024-08-22 PROCEDURE — 71045 X-RAY EXAM CHEST 1 VIEW: CPT

## 2024-08-22 PROCEDURE — 85610 PROTHROMBIN TIME: CPT | Performed by: EMERGENCY MEDICINE

## 2024-08-22 PROCEDURE — 85730 THROMBOPLASTIN TIME PARTIAL: CPT | Performed by: EMERGENCY MEDICINE

## 2024-08-22 PROCEDURE — 36415 COLL VENOUS BLD VENIPUNCTURE: CPT | Performed by: EMERGENCY MEDICINE

## 2024-08-22 PROCEDURE — 2500000001 HC RX 250 WO HCPCS SELF ADMINISTERED DRUGS (ALT 637 FOR MEDICARE OP): Performed by: NURSE PRACTITIONER

## 2024-08-22 PROCEDURE — 87040 BLOOD CULTURE FOR BACTERIA: CPT | Mod: WESLAB | Performed by: EMERGENCY MEDICINE

## 2024-08-22 PROCEDURE — 96361 HYDRATE IV INFUSION ADD-ON: CPT

## 2024-08-22 PROCEDURE — 2500000004 HC RX 250 GENERAL PHARMACY W/ HCPCS (ALT 636 FOR OP/ED): Performed by: NURSE PRACTITIONER

## 2024-08-22 PROCEDURE — 97166 OT EVAL MOD COMPLEX 45 MIN: CPT | Mod: GO

## 2024-08-22 PROCEDURE — 99291 CRITICAL CARE FIRST HOUR: CPT

## 2024-08-22 RX ORDER — CYCLOSPORINE 0.5 MG/ML
1 EMULSION OPHTHALMIC DAILY
Status: DISCONTINUED | OUTPATIENT
Start: 2024-08-23 | End: 2024-08-24 | Stop reason: HOSPADM

## 2024-08-22 RX ORDER — ACETAMINOPHEN 325 MG/1
650 TABLET ORAL EVERY 4 HOURS PRN
Status: DISCONTINUED | OUTPATIENT
Start: 2024-08-22 | End: 2024-08-23

## 2024-08-22 RX ORDER — AMLODIPINE BESYLATE 10 MG/1
10 TABLET ORAL DAILY
Status: DISCONTINUED | OUTPATIENT
Start: 2024-08-22 | End: 2024-08-23

## 2024-08-22 RX ORDER — SODIUM BICARBONATE 650 MG/1
650 TABLET ORAL 4 TIMES DAILY
Status: DISCONTINUED | OUTPATIENT
Start: 2024-08-22 | End: 2024-08-23

## 2024-08-22 RX ORDER — ACETAMINOPHEN 160 MG/5ML
650 SOLUTION ORAL EVERY 4 HOURS PRN
Status: DISCONTINUED | OUTPATIENT
Start: 2024-08-22 | End: 2024-08-23

## 2024-08-22 RX ORDER — ALBUTEROL SULFATE 0.83 MG/ML
2.5 SOLUTION RESPIRATORY (INHALATION) EVERY 6 HOURS PRN
Status: DISCONTINUED | OUTPATIENT
Start: 2024-08-22 | End: 2024-08-24 | Stop reason: HOSPADM

## 2024-08-22 RX ORDER — CEFTRIAXONE 2 G/50ML
2 INJECTION, SOLUTION INTRAVENOUS EVERY 24 HOURS
Status: DISCONTINUED | OUTPATIENT
Start: 2024-08-23 | End: 2024-08-23

## 2024-08-22 RX ORDER — ATORVASTATIN CALCIUM 10 MG/1
10 TABLET, FILM COATED ORAL NIGHTLY
Status: DISCONTINUED | OUTPATIENT
Start: 2024-08-22 | End: 2024-08-23

## 2024-08-22 RX ORDER — POLYVINYL ALCOHOL 14 MG/ML
1 SOLUTION/ DROPS OPHTHALMIC AS NEEDED
Status: DISCONTINUED | OUTPATIENT
Start: 2024-08-22 | End: 2024-08-24 | Stop reason: HOSPADM

## 2024-08-22 RX ORDER — ACETAMINOPHEN 325 MG/1
650 TABLET ORAL ONCE
Status: COMPLETED | OUTPATIENT
Start: 2024-08-22 | End: 2024-08-22

## 2024-08-22 RX ORDER — MORPHINE SULFATE 2 MG/ML
1 INJECTION, SOLUTION INTRAMUSCULAR; INTRAVENOUS ONCE
Status: COMPLETED | OUTPATIENT
Start: 2024-08-22 | End: 2024-08-22

## 2024-08-22 RX ORDER — HYDROXYZINE HYDROCHLORIDE 25 MG/1
25 TABLET, FILM COATED ORAL EVERY 6 HOURS PRN
Status: DISCONTINUED | OUTPATIENT
Start: 2024-08-22 | End: 2024-08-24 | Stop reason: HOSPADM

## 2024-08-22 RX ORDER — SEVELAMER CARBONATE 800 MG/1
800 TABLET, FILM COATED ORAL
Status: DISCONTINUED | OUTPATIENT
Start: 2024-08-22 | End: 2024-08-23

## 2024-08-22 RX ORDER — HEPARIN SODIUM 5000 [USP'U]/ML
5000 INJECTION, SOLUTION INTRAVENOUS; SUBCUTANEOUS EVERY 8 HOURS
Status: DISCONTINUED | OUTPATIENT
Start: 2024-08-22 | End: 2024-08-24 | Stop reason: HOSPADM

## 2024-08-22 RX ORDER — TALC
3 POWDER (GRAM) TOPICAL NIGHTLY PRN
Status: DISCONTINUED | OUTPATIENT
Start: 2024-08-22 | End: 2024-08-24 | Stop reason: HOSPADM

## 2024-08-22 RX ORDER — MORPHINE SULFATE 2 MG/ML
2 INJECTION, SOLUTION INTRAMUSCULAR; INTRAVENOUS ONCE
Status: COMPLETED | OUTPATIENT
Start: 2024-08-22 | End: 2024-08-22

## 2024-08-22 RX ORDER — BISACODYL 5 MG
10 TABLET, DELAYED RELEASE (ENTERIC COATED) ORAL DAILY PRN
Status: DISCONTINUED | OUTPATIENT
Start: 2024-08-22 | End: 2024-08-24 | Stop reason: HOSPADM

## 2024-08-22 RX ORDER — ONDANSETRON HYDROCHLORIDE 2 MG/ML
4 INJECTION, SOLUTION INTRAVENOUS EVERY 8 HOURS PRN
Status: DISCONTINUED | OUTPATIENT
Start: 2024-08-22 | End: 2024-08-24 | Stop reason: HOSPADM

## 2024-08-22 RX ORDER — CALCITRIOL 0.25 UG/1
0.25 CAPSULE ORAL EVERY OTHER DAY
Status: DISCONTINUED | OUTPATIENT
Start: 2024-08-23 | End: 2024-08-23

## 2024-08-22 RX ORDER — CEFTRIAXONE 1 G/50ML
1 INJECTION, SOLUTION INTRAVENOUS ONCE
Status: COMPLETED | OUTPATIENT
Start: 2024-08-22 | End: 2024-08-22

## 2024-08-22 RX ORDER — ONDANSETRON 4 MG/1
4 TABLET, FILM COATED ORAL EVERY 8 HOURS PRN
Status: DISCONTINUED | OUTPATIENT
Start: 2024-08-22 | End: 2024-08-24 | Stop reason: HOSPADM

## 2024-08-22 RX ORDER — FERROUS SULFATE 325(65) MG
1 TABLET ORAL
Status: DISCONTINUED | OUTPATIENT
Start: 2024-08-22 | End: 2024-08-23

## 2024-08-22 RX ORDER — ALUMINUM HYDROXIDE, MAGNESIUM HYDROXIDE, AND SIMETHICONE 1200; 120; 1200 MG/30ML; MG/30ML; MG/30ML
30 SUSPENSION ORAL 4 TIMES DAILY PRN
Status: DISCONTINUED | OUTPATIENT
Start: 2024-08-22 | End: 2024-08-24 | Stop reason: HOSPADM

## 2024-08-22 RX ORDER — ACETAMINOPHEN 650 MG/1
650 SUPPOSITORY RECTAL EVERY 4 HOURS PRN
Status: DISCONTINUED | OUTPATIENT
Start: 2024-08-22 | End: 2024-08-23

## 2024-08-22 SDOH — SOCIAL STABILITY: SOCIAL INSECURITY: DO YOU FEEL ANYONE HAS EXPLOITED OR TAKEN ADVANTAGE OF YOU FINANCIALLY OR OF YOUR PERSONAL PROPERTY?: NO

## 2024-08-22 SDOH — SOCIAL STABILITY: SOCIAL INSECURITY: HAVE YOU HAD THOUGHTS OF HARMING ANYONE ELSE?: NO

## 2024-08-22 SDOH — SOCIAL STABILITY: SOCIAL INSECURITY: HAVE YOU HAD ANY THOUGHTS OF HARMING ANYONE ELSE?: NO

## 2024-08-22 SDOH — SOCIAL STABILITY: SOCIAL INSECURITY: WERE YOU ABLE TO COMPLETE ALL THE BEHAVIORAL HEALTH SCREENINGS?: YES

## 2024-08-22 SDOH — SOCIAL STABILITY: SOCIAL INSECURITY: HAS ANYONE EVER THREATENED TO HURT YOUR FAMILY OR YOUR PETS?: NO

## 2024-08-22 SDOH — SOCIAL STABILITY: SOCIAL INSECURITY: DO YOU FEEL UNSAFE GOING BACK TO THE PLACE WHERE YOU ARE LIVING?: NO

## 2024-08-22 SDOH — SOCIAL STABILITY: SOCIAL INSECURITY: DOES ANYONE TRY TO KEEP YOU FROM HAVING/CONTACTING OTHER FRIENDS OR DOING THINGS OUTSIDE YOUR HOME?: NO

## 2024-08-22 SDOH — SOCIAL STABILITY: SOCIAL INSECURITY: ARE YOU OR HAVE YOU BEEN THREATENED OR ABUSED PHYSICALLY, EMOTIONALLY, OR SEXUALLY BY ANYONE?: NO

## 2024-08-22 SDOH — SOCIAL STABILITY: SOCIAL INSECURITY: ARE THERE ANY APPARENT SIGNS OF INJURIES/BEHAVIORS THAT COULD BE RELATED TO ABUSE/NEGLECT?: NO

## 2024-08-22 SDOH — SOCIAL STABILITY: SOCIAL INSECURITY: ABUSE: ADULT

## 2024-08-22 ASSESSMENT — COGNITIVE AND FUNCTIONAL STATUS - GENERAL
DRESSING REGULAR LOWER BODY CLOTHING: A LOT
MOVING FROM LYING ON BACK TO SITTING ON SIDE OF FLAT BED WITH BEDRAILS: A LOT
WALKING IN HOSPITAL ROOM: A LOT
PERSONAL GROOMING: A LITTLE
HELP NEEDED FOR BATHING: A LOT
STANDING UP FROM CHAIR USING ARMS: A LOT
MOVING FROM LYING ON BACK TO SITTING ON SIDE OF FLAT BED WITH BEDRAILS: A LOT
STANDING UP FROM CHAIR USING ARMS: A LOT
DRESSING REGULAR LOWER BODY CLOTHING: A LOT
MOVING FROM LYING ON BACK TO SITTING ON SIDE OF FLAT BED WITH BEDRAILS: A LOT
HELP NEEDED FOR BATHING: A LOT
MOVING TO AND FROM BED TO CHAIR: A LOT
WALKING IN HOSPITAL ROOM: A LOT
TOILETING: A LOT
MOBILITY SCORE: 12
STANDING UP FROM CHAIR USING ARMS: A LOT
DRESSING REGULAR LOWER BODY CLOTHING: A LOT
MOBILITY SCORE: 12
CLIMB 3 TO 5 STEPS WITH RAILING: A LOT
DAILY ACTIVITIY SCORE: 14
DAILY ACTIVITIY SCORE: 13
PERSONAL GROOMING: A LOT
MOVING TO AND FROM BED TO CHAIR: A LOT
HELP NEEDED FOR BATHING: A LOT
DRESSING REGULAR UPPER BODY CLOTHING: A LOT
DRESSING REGULAR LOWER BODY CLOTHING: A LOT
TURNING FROM BACK TO SIDE WHILE IN FLAT BAD: A LOT
EATING MEALS: A LITTLE
DRESSING REGULAR UPPER BODY CLOTHING: A LOT
MOVING TO AND FROM BED TO CHAIR: A LOT
WALKING IN HOSPITAL ROOM: A LOT
TURNING FROM BACK TO SIDE WHILE IN FLAT BAD: A LOT
HELP NEEDED FOR BATHING: A LOT
TOILETING: A LOT
DAILY ACTIVITIY SCORE: 12
DRESSING REGULAR UPPER BODY CLOTHING: A LOT
EATING MEALS: A LITTLE
DAILY ACTIVITIY SCORE: 13
TOILETING: A LOT
EATING MEALS: A LITTLE
PERSONAL GROOMING: A LOT
MOBILITY SCORE: 12
DRESSING REGULAR UPPER BODY CLOTHING: A LOT
PERSONAL GROOMING: A LOT
TURNING FROM BACK TO SIDE WHILE IN FLAT BAD: A LOT
PATIENT BASELINE BEDBOUND: NO
CLIMB 3 TO 5 STEPS WITH RAILING: A LOT
CLIMB 3 TO 5 STEPS WITH RAILING: A LOT
PATIENT BASELINE BEDBOUND: NO
TOILETING: A LOT
EATING MEALS: A LOT

## 2024-08-22 ASSESSMENT — PAIN SCALES - GENERAL
PAINLEVEL_OUTOF10: 1
PAINLEVEL_OUTOF10: 0 - NO PAIN
PAINLEVEL_OUTOF10: 0 - NO PAIN

## 2024-08-22 ASSESSMENT — LIFESTYLE VARIABLES
HOW MANY STANDARD DRINKS CONTAINING ALCOHOL DO YOU HAVE ON A TYPICAL DAY: PATIENT DOES NOT DRINK
HOW OFTEN DO YOU HAVE 6 OR MORE DRINKS ON ONE OCCASION: NEVER
HOW OFTEN DO YOU HAVE A DRINK CONTAINING ALCOHOL: NEVER
PRESCIPTION_ABUSE_PAST_12_MONTHS: NO
AUDIT-C TOTAL SCORE: 0
SUBSTANCE_ABUSE_PAST_12_MONTHS: NO
AUDIT-C TOTAL SCORE: 0
SKIP TO QUESTIONS 9-10: 1

## 2024-08-22 ASSESSMENT — PAIN - FUNCTIONAL ASSESSMENT
PAIN_FUNCTIONAL_ASSESSMENT: 0-10
PAIN_FUNCTIONAL_ASSESSMENT: 0-10
PAIN_FUNCTIONAL_ASSESSMENT: WONG-BAKER FACES

## 2024-08-22 ASSESSMENT — ACTIVITIES OF DAILY LIVING (ADL)
TOILETING: NEEDS ASSISTANCE
BATHING: INDEPENDENT
ADEQUATE_TO_COMPLETE_ADL: YES
PATIENT'S MEMORY ADEQUATE TO SAFELY COMPLETE DAILY ACTIVITIES?: YES
HEARING - RIGHT EAR: FUNCTIONAL
BATHING_ASSISTANCE: MODERATE
ASSISTIVE_DEVICE: WALKER;EYEGLASSES
HEARING - LEFT EAR: FUNCTIONAL
DRESSING YOURSELF: NEEDS ASSISTANCE
GROOMING: NEEDS ASSISTANCE
ADEQUATE_TO_COMPLETE_ADL: YES
JUDGMENT_ADEQUATE_SAFELY_COMPLETE_DAILY_ACTIVITIES: YES
BATHING: NEEDS ASSISTANCE
WALKS IN HOME: NEEDS ASSISTANCE
HEARING - LEFT EAR: FUNCTIONAL
GROOMING: INDEPENDENT
DRESSING YOURSELF: INDEPENDENT
ADL_ASSISTANCE: INDEPENDENT
JUDGMENT_ADEQUATE_SAFELY_COMPLETE_DAILY_ACTIVITIES: YES
FEEDING YOURSELF: NEEDS ASSISTANCE
ASSISTIVE_DEVICE: WALKER;EYEGLASSES
PATIENT'S MEMORY ADEQUATE TO SAFELY COMPLETE DAILY ACTIVITIES?: YES
HEARING - RIGHT EAR: FUNCTIONAL
FEEDING YOURSELF: INDEPENDENT
WALKS IN HOME: INDEPENDENT

## 2024-08-22 ASSESSMENT — PAIN DESCRIPTION - LOCATION
LOCATION: ABDOMEN
LOCATION: OTHER (COMMENT)

## 2024-08-22 ASSESSMENT — PAIN DESCRIPTION - ORIENTATION
ORIENTATION: MID;LOWER
ORIENTATION: RIGHT;LEFT

## 2024-08-22 ASSESSMENT — PATIENT HEALTH QUESTIONNAIRE - PHQ9
2. FEELING DOWN, DEPRESSED OR HOPELESS: NOT AT ALL
SUM OF ALL RESPONSES TO PHQ9 QUESTIONS 1 & 2: 0
1. LITTLE INTEREST OR PLEASURE IN DOING THINGS: NOT AT ALL

## 2024-08-22 ASSESSMENT — COLUMBIA-SUICIDE SEVERITY RATING SCALE - C-SSRS
1. IN THE PAST MONTH, HAVE YOU WISHED YOU WERE DEAD OR WISHED YOU COULD GO TO SLEEP AND NOT WAKE UP?: NO
6. HAVE YOU EVER DONE ANYTHING, STARTED TO DO ANYTHING, OR PREPARED TO DO ANYTHING TO END YOUR LIFE?: NO
2. HAVE YOU ACTUALLY HAD ANY THOUGHTS OF KILLING YOURSELF?: NO

## 2024-08-22 ASSESSMENT — PAIN SCALES - WONG BAKER: WONGBAKER_NUMERICALRESPONSE: HURTS LITTLE MORE

## 2024-08-22 NOTE — PROGRESS NOTES
Pharmacy Medication History Review    Annetta Miguel is a 89 y.o. female admitted for Hyponatremia. Pharmacy reviewed the patient's [id number] medications and allergies for accuracy.    Medications ADDED:  NONE  Medications CHANGED:  Cacitriol 0.25mcg - daily  Medications REMOVED:   Restasis  Liquifilm  Miralax      The list below reflects the updated PTA list. Comments regarding how patient may be taking medications differently can be found in the Admit Orders Activity  Prior to Admission Medications   Prescriptions Last Dose Informant   amLODIPine (Norvasc) 10 mg tablet 8/21/2024 family   Sig: Take 1 tablet (10 mg) by mouth once daily.   atorvastatin (Lipitor) 10 mg tablet 8/21/2024 family   Sig: TAKE 1 TABLET BY MOUTH EVERY DAY   calcitriol (Rocaltrol) 0.25 mcg capsule 8/21/2024 family   Sig: Take 1 capsule (0.25 mcg) by mouth once daily.   cefuroxime (Ceftin) 500 mg tablet 8/21/2024 family   Sig: Take 1 tablet (500 mg) by mouth 2 times a day for 4 days.   ferrous sulfate, 325 mg ferrous sulfate, tablet 8/21/2024 family   Sig: Take 1 tablet by mouth once daily with breakfast.   sevelamer carbonate (Renvela) 800 mg tablet 8/21/2024 family   Sig: Take 1 tablet (800 mg) by mouth 3 times daily (morning, midday, late afternoon).   sodium bicarbonate 650 mg tablet 8/21/2024 family   Sig: Take 1 tablet (650 mg) by mouth 4 times a day.      Facility-Administered Medications Last Administration Doses Remaining   denosumab (Prolia) injection 60 mg 7/17/2024 10:35 AM 1           The list below reflects the updated allergy list. Please review each documented allergy for additional clarification and justification.  Allergies  Reviewed by Nidhi Espana on 8/22/2024        Severity Reactions Comments    Oxycodone-acetaminophen Medium Other Vomiting     Other Low Rash IV Dye Iodine Containing  Iodine Base Dyes    Oxycodone Low Other Vomiting            Pharmacy has been updated to [facility].    Sources used to complete the med history include dispense history, PTA medication list, patient/family interview. Family is a good historian.    Below are additional concerns with the patient's PTA list.  none    Nidhi Espana, [employer]  Please reach out via [employer] Secure Chat for questions

## 2024-08-22 NOTE — ED PROVIDER NOTES
EMERGENCY DEPARTMENT ENCOUNTER      Pt Name: Annetta Miguel  MRN: [id number]  Birthdate 6/4/1930  Date of evaluation: 8/22/2024  ED Provider: Iman Hong DO     CHIEF COMPLAINT       Chief Complaint   Patient presents with    Weakness, Gen       HISTORY OF PRESENT ILLNESS    Annetta Miguel is a 89 y.o. who presents to the emergency department after recent admission for generalized weakness.  The patient was recently admitted for bacteria and generalized weakness with significant electrolyte disturbances.  According to her daughter she has a history of CKD 4 but has refused dialysis.  She was evaluated and deemed not to qualify for skilled rehab and discharged back to her independent living facility with her daughter who is [place].  The patient was generally weak at home however today the patient was barely able to get in bed.  She felt significantly weak and fatigued.  She has had a poor appetite with limited oral intake.  EMS was contacted and she was brought back in for reevaluation.  She previously was functionally independent and utilized a walker for ambulation.    REVIEW OF SYSTEMS     Focused ROS performed and negative other than as listed in HPI    PAST MEDICAL HISTORY     Past Medical History:   Diagnosis Date    Cervical stenosis of spine     CKD (chronic kidney disease)     HLD (hyperlipidemia)     Hypertension     Insomnia     Osteoporosis        SURGICAL HISTORY     No past surgical history on file.    CURRENT MEDICATIONS       Previous Medications    AMLODIPINE (NORVASC) 10 MG TABLET    Take 1 tablet (10 mg) by mouth once daily.    ATORVASTATIN (LIPITOR) 10 MG TABLET    TAKE 1 TABLET BY MOUTH EVERY DAY    CALCITRIOL (ROCALTROL) 0.25 MCG CAPSULE    Take 1 capsule (0.25 mcg) by mouth every other day.    CEFUROXIME (CEFTIN) 500 MG TABLET    Take 1 tablet (500 mg) by mouth 2 times a day for 4 days.    CYCLOSPORINE (RESTASIS MULTIDOSE) 0.05 % DROPS     Administer 1 drop into affected eye(s) 2 times a day.    FERROUS SULFATE, 325 MG FERROUS SULFATE, TABLET    Take 1 tablet by mouth once daily with breakfast.    POLYETHYLENE GLYCOL (GLYCOLAX, MIRALAX) 17 GRAM/DOSE POWDER    Take by mouth once daily as needed.  FOR CONSTIPATION    POLYVINYL ALCOHOL (LIQUIFILM TEARS) 1.4 % OPHTHALMIC SOLUTION    Ophthalmic    SEVELAMER CARBONATE (RENVELA) 800 MG TABLET    Take 1 tablet (800 mg) by mouth 3 times daily (morning, midday, late afternoon).    SODIUM BICARBONATE 650 MG TABLET    Take 1 tablet (650 mg) by mouth 4 times a day.       ALLERGIES     Oxycodone-acetaminophen, Other, and Oxycodone    FAMILY HISTORY       Family History   Problem Relation Name Age of Onset    Stroke Mother      Other (Heart Condition) Father      Diabetes Father      Heart disease Father      Other (Intestinal Complications) Sister      Parkinsonism Brother          SOCIAL HISTORY       Social History     Socioeconomic History    Marital status:    Tobacco Use    Smoking status: Never     Passive exposure: Never    Smokeless tobacco: Never   Vaping Use    Vaping status: Never Used   Substance and Sexual Activity    Alcohol use: Not Currently    Drug use: Never    Sexual activity: Not Currently     Social Determinants of Health     Financial Resource Strain: Low Risk  (8/17/2024)    Overall Financial Resource Strain (CARDIA)     Difficulty of Paying Living Expenses: Not hard at all   Food Insecurity: No Food Insecurity (8/17/2024)    Hunger Vital Sign     Worried About Running Out of Food in the Last Year: Never true     Ran Out of Food in the Last Year: Never true   Transportation Needs: No Transportation Needs (8/17/2024)    PRAPARE - Transportation     Lack of Transportation (Medical): No     Lack of Transportation (Non-Medical): No   Physical Activity: Inactive (8/17/2024)    Exercise Vital Sign     Days of Exercise per Week: 0 days     Minutes of Exercise per Session: 0 min   Stress: No  Stress Concern Present (8/17/2024)    [place] [facility] [employer] - Occupational Stress Questionnaire     Feeling of Stress : Not at all   Social Connections: Socially Isolated (8/17/2024)    Social Connection and Isolation Panel [NHANES]     Frequency of Communication with Friends and Family: More than three times a week     Frequency of Social Gatherings with Friends and Family: Three times a week     Attends Hinduism Services: Never     Active Member of Clubs or Organizations: No     Attends Club or Organization Meetings: Never     Marital Status:    Intimate Partner Violence: Not At Risk (8/17/2024)    Humiliation, Afraid, Rape, and Kick questionnaire     Fear of Current or Ex-Partner: No     Emotionally Abused: No     Physically Abused: No     Sexually Abused: No   Housing Stability: Low Risk  (8/17/2024)    Housing Stability Vital Sign     Unable to Pay for Housing in the Last Year: No     Number of Times Moved in the Last Year: 0     Homeless in the Last Year: No       PHYSICAL EXAM       ED Triage Vitals [08/22/24 0941]   Temperature Heart Rate Respirations BP   36.7 °C (98.1 °F) 90 18 141/53      Pulse Ox Temp Source Heart Rate Source Patient Position   97 % Temporal Monitor Sitting      BP Location FiO2 (%)     Left arm --        General: Appears fatigued, STD age, but in no acute distress, alert  Head: Head atraumatic; normocephalic  Eyes: normal inspection; no icterus  ENT: mucosa moist without lesion  Neck: Normal inspection, no meningeal signs  Resp: Normal breath sounds, no wheeze or crackles; No respiratory distress  Chest Wall: no tenderness or deformity  Heart: Heart rate and rhythm regular; No Murmurs  Abdomen: Soft, Non-tender; No distention, guarding, rigidity, or rebound  MSK: Normal appearance; Moves all extremities; No Pedal edema  Neuro: Alert; no focal deficits, moves all extremities  Psych: Mood and Affect normal  Skin: Color appropriate; warm; Dry    DIAGNOSTIC  RESULTS   Lab and radiology results are independently interpreted unless noted below.  RADIOLOGY (Per Emergency Physician):     Interpretation per the Radiologist below, if available at the time of this note:  XR chest 1 view   Final Result   New small infiltrate and/or pleural effusion at the right lung base.        MACRO:   None.        Signed by: Italia Ferreira 8/22/2024 11:23 AM   Dictation workstation:   [id number]          LABS:  Abnormal Labs Reviewed   CBC WITH AUTO DIFFERENTIAL - Abnormal; Notable for the following components:       Result Value    WBC 13.7 (*)     RBC 2.50 (*)     Hemoglobin 7.8 (*)     Hematocrit 22.2 (*)     Immature Granulocytes %, Automated 1.8 (*)     Neutrophils Absolute 12.07 (*)     Lymphocytes Absolute 0.68 (*)     All other components within normal limits   COMPREHENSIVE METABOLIC PANEL - Abnormal; Notable for the following components:    Glucose 107 (*)     Sodium 122 (*)     Chloride 90 (*)     Bicarbonate 19 (*)     Urea Nitrogen 59 (*)     Creatinine 3.30 (*)     eGFR 12 (*)     Calcium 5.8 (*)     Albumin 2.8 (*)     Total Protein 5.3 (*)     All other components within normal limits       All other labs were within normal range or not returned as of this dictation.    EKG:  Personally interpreted by Iman Hong, DO  0949: Normal sinus rhythm with a right bundle branch block pattern ventricular rate 85 normal axis and intervals no acute ischemic changes    EMERGENCY DEPARTMENT COURSE/MDM   Patient presents with generalized weakness and inability to maintain at home after recent discharge.  She has had limited oral intake as well.  Workup was initially with concern for dehydration, worsening infection, and electrolyte abnormalities.  Daughter is agreeable to plan of care.    ED Course as of 08/22/24 1242   Thu Aug 22, 2024   1158 Lab work returned showing critical electrolyte derangements including a sodium of 122 with calcium of 5.8.  She has been hyponatremic previously  though not this low. [EF]   1159 Renal function is stable compared to prior.  She does have a mild leukocytosis that has been present previously as well.  Lactate is unremarkable and she has remained hemodynamically stable.  Daughter is updated on these findings and is agreeable with plan of admission. [EF]   1240 Case discussed with the hospitalist who excepted the patient for admission.  Did note the concern for infiltrate in the right lower lobe.  The patient has been on Ceftin as an outpatient.  Will broaden coverage with Rocephin and azithromycin after discussion with the hospitalist to cover for a questionable pneumonia. [EF]      ED Course User Index  [EF] Iman Hong, DO         Diagnoses as of 08/22/24 1242   Hyponatremia   Pneumonia of right lower lobe due to infectious organism   Stage 4 chronic kidney disease (Multi)   Hypocalcemia   Generalized weakness       Meds Administered:  Medications   cefTRIAXone (Rocephin) 1 g in dextrose (iso) IV 50 mL (1 g intravenous New Bag 8/22/24 1233)   azithromycin 500 mg in dextrose 5% 250 mL IV (has no administration in time range)   acetaminophen (Tylenol) tablet 650 mg (has no administration in time range)   morphine injection 2 mg (has no administration in time range)   sodium chloride 0.9 % bolus 1,000 mL (0 mL intravenous Stopped 8/22/24 1214)       PROCEDURES   Unless otherwise noted below, none  Procedures      FINAL IMPRESSION      1. Hyponatremia    2. Pneumonia of right lower lobe due to infectious organism    3. Stage 4 chronic kidney disease (Multi)    4. Hypocalcemia    5. Generalized weakness          DISPOSITION    Admit 08/22/2024 12:18:48 PM    Critical Care Time   TOTAL CRITICAL CARE TIME (EXCLUDING SEPARATE BILLABLE PROCEDURES): 32 min  CC time assessed for complex medical decision making, coordination of care between providers and specialists, discussion with family (if patient unable to contribute), documentation of findings, and interpretation  of labwork and imaging.      (Comment: Please note this report has been produced using speech recognition software and may contain errors related to that system including errors in grammar, punctuation, and spelling, as well as words and phrases that may be inappropriate.  If there are any questions or concerns please feel free to contact the dictating provider for clarification.)    Iman Hong DO (electronically signed)  Emergency Medicine Physician    History Limited by: None  Independent history obtained from: EMS and Family Member daughter  External records reviewed: Inpatient Notes/Discharge Summary from 8/20/24 as well as prior labs  Diagnostics interpreted by me: EKG - see my independent interpretation elsewhere in the chart  Discussions with other clinicians: Hospitalist/Admitting Team    Chronic conditions impacting care:  renal disease  Social determinants of health affecting care:  lives alone in independent living facility  Diagnostic tests considered but not performed: n/a  ED Medications managed:  Medications   cefTRIAXone (Rocephin) 1 g in dextrose (iso) IV 50 mL (1 g intravenous New Bag 8/22/24 1233)   azithromycin 500 mg in dextrose 5% 250 mL IV (has no administration in time range)   acetaminophen (Tylenol) tablet 650 mg (has no administration in time range)   morphine injection 2 mg (has no administration in time range)   sodium chloride 0.9 % bolus 1,000 mL (0 mL intravenous Stopped 8/22/24 1214)       Prescription drugs considered: None             Iman Hong DO  08/22/24 1243

## 2024-08-22 NOTE — NURSING NOTE
Assumed care of patient arriving from ED. Family at bedside, vital signs stable. No needs stated at this time. Call light within reach.

## 2024-08-22 NOTE — ASSESSMENT & PLAN NOTE
Patient follows with Dr. Antione Littlejohn  Patient adamantly refuses hemodialysis  Creatinine today is 3.4  Nephrology consulted

## 2024-08-22 NOTE — CARE PLAN
The patient's goals for the shift include improve mobility.    The clinical goals for the shift include improve electrolyes    Problem: Pain - Adult  Goal: Verbalizes/displays adequate comfort level or baseline comfort level  Outcome: Progressing     Problem: Safety - Adult  Goal: Free from fall injury  Outcome: Progressing     Problem: Discharge Planning  Goal: Discharge to home or other facility with appropriate resources  Outcome: Progressing     Problem: Chronic Conditions and Co-morbidities  Goal: Patient's chronic conditions and co-morbidity symptoms are monitored and maintained or improved  Outcome: Progressing     Problem: Pain  Goal: Takes deep breaths with improved pain control throughout the shift  Outcome: Progressing  Goal: Turns in bed with improved pain control throughout the shift  Outcome: Progressing  Goal: Walks with improved pain control throughout the shift  Outcome: Progressing  Goal: Performs ADL's with improved pain control throughout shift  Outcome: Progressing  Goal: Participates in PT with improved pain control throughout the shift  Outcome: Progressing  Goal: Free from opioid side effects throughout the shift  Outcome: Progressing  Goal: Free from acute confusion related to pain meds throughout the shift  Outcome: Progressing     Problem: Skin  Goal: Decreased wound size/increased tissue granulation at next dressing change  Outcome: Progressing  Flowsheets (Taken 8/22/2024 1738)  Decreased wound size/increased tissue granulation at next dressing change: Protective dressings over bony prominences  Goal: Participates in plan/prevention/treatment measures  Outcome: Progressing  Flowsheets (Taken 8/22/2024 1738)  Participates in plan/prevention/treatment measures:   Increase activity/out of bed for meals   Discuss with provider PT/OT consult  Goal: Prevent/manage excess moisture  Outcome: Progressing  Flowsheets (Taken 8/22/2024 1738)  Prevent/manage excess moisture:   Monitor for/manage  infection if present   Cleanse incontinence/protect with barrier cream  Goal: Prevent/minimize sheer/friction injuries  Outcome: Progressing  Flowsheets (Taken 8/22/2024 1738)  Prevent/minimize sheer/friction injuries:   Use pull sheet   Increase activity/out of bed for meals   HOB 30 degrees or less  Goal: Promote/optimize nutrition  Outcome: Progressing  Flowsheets (Taken 8/22/2024 1738)  Promote/optimize nutrition:   Monitor/record intake including meals   Offer water/supplements/favorite foods  Goal: Promote skin healing  Outcome: Progressing  Flowsheets (Taken 8/22/2024 1738)  Promote skin healing:   Protective dressings over bony prominences   Assess skin/pad under line(s)/device(s)     Problem: Fall/Injury  Goal: Not fall by end of shift  Outcome: Progressing  Goal: Be free from injury by end of the shift  Outcome: Progressing  Goal: Verbalize understanding of personal risk factors for fall in the hospital  Outcome: Progressing  Goal: Verbalize understanding of risk factor reduction measures to prevent injury from fall in the home  Outcome: Progressing  Goal: Use assistive devices by end of the shift  Outcome: Progressing  Goal: Pace activities to prevent fatigue by end of the shift  Outcome: Progressing

## 2024-08-22 NOTE — ASSESSMENT & PLAN NOTE
Chest x-ray showed infiltrate versus pleural effusion on the right base  Azithromycin and Rocephin daily  Blood cultures and sputum cultures pending  Oxygen as needed for saturation greater 92%  Albuterol as needed for wheezing/shortness of breath

## 2024-08-22 NOTE — ASSESSMENT & PLAN NOTE
Recently discharged back to assisted living at [facility]  Was unable to ambulate today  PT/OT to evaluate and treat  Most likely will need SNF for rehab

## 2024-08-22 NOTE — PROGRESS NOTES
Occupational Therapy    Evaluation    Patient Name: Annetta Miguel  MRN: [id number]  Today's Date: 8/22/2024  Time Calculation  Start Time: 1420  Stop Time: 1437  Time Calculation (min): 17 min        Assessment:  OT Assessment: pt presents with generalized weakness, deconditioning and decreased balance/endurance which impedes ADL performance. pt would benefit from skilled OT services to address these deficits and to facilitate highest level of independence.  Prognosis: Fair  Barriers to Discharge: Decreased caregiver support  Evaluation/Treatment Tolerance: Patient limited by fatigue  Medical Staff Made Aware: Yes  End of Session Communication: Bedside nurse  End of Session Patient Position: On cart, Alarm off, caregiver present  OT Assessment Results: Decreased ADL status, Decreased endurance, Decreased functional mobility  Prognosis: Fair  Barriers to Discharge: Decreased caregiver support  Evaluation/Treatment Tolerance: Patient limited by fatigue  Medical Staff Made Aware: Yes  Strengths: Ability to acquire knowledge, Attitude of self  Barriers to Participation: Comorbidities  Plan:  Treatment Interventions: ADL retraining, Functional transfer training, UE strengthening/ROM, Endurance training, Patient/family training, Equipment evaluation/education, Compensatory technique education  OT Frequency: 3 times per week  OT Discharge Recommendations: Moderate intensity level of continued care  Equipment Recommended upon Discharge: Wheeled walker  OT Recommended Transfer Status: Assist of 1, Moderate assist  OT - OK to Discharge: Yes  Treatment Interventions: ADL retraining, Functional transfer training, UE strengthening/ROM, Endurance training, Patient/family training, Equipment evaluation/education, Compensatory technique education    Subjective     General:  General  Reason for Referral: ADL impairment; 89 y.o. female presenting with generalized weakness. Patient was recently admitted for UTI and generalized  weakness along with hyponatremia.  X-ray showed right lower lobe infiltrate possibly pneumonia  Past Medical History Relevant to Rehab: history of stage IV chronic kidney disease and adamantly refuses hemodialysis  Family/Caregiver Present: Yes  Caregiver Feedback: son in law present  Prior to Session Communication: Bedside nurse  Patient Position Received: On cart, Alarm off, caregiver present  Preferred Learning Style: verbal, visual  General Comment: pt agreeable with therapy however with significant fatigue and decreased arousal  Precautions:  Medical Precautions: Fall precautions  Vital Signs:  Heart Rate: 75  Heart Rate Source: Monitor  Pulse Ox: 98 %  BP: 105/73  Patient Position: Sitting  Pain:  Pain Assessment  Pain Assessment: 0-10  0-10 (Numeric) Pain Score: 0 - No pain    Objective   Cognition:  Overall Cognitive Status: Impaired  Arousal/Alertness: Delayed responses to stimuli  Orientation Level: Oriented X4  Processing Speed: Delayed           Home Living:  Type of Home: Independent living ([facility])  Home Adaptive Equipment: Walker rolling or standard  Home Layout: One level  Home Access: Elevator  Bathroom Shower/Tub: Walk-in shower  Bathroom Toilet: Adaptive toilet seating  Bathroom Equipment: Grab bars in shower, Shower chair with back, Grab bars around toilet  Prior Function:  Level of [place]: Independent with ADLs and functional transfers, Independent with homemaking with ambulation  ADL Assistance: Independent  Homemaking Assistance: Independent  Ambulatory Assistance:  (no device at baseline, walks to dinning young for meals)  Vocational: Retired  Prior Function Comments: patient has been in/out of hospital for past 3 weeks and has been requring more assistance but was independent prior to recent episodes.  patient was recently discharged from this hospital and returned to [facility] with daughter staying with her however became progessively more weak and came back     ADL:  Eating Deficit:  Setup (anticipated)  Grooming Assistance: Minimal (anticipated)  Bathing Assistance: Moderate (anticipated)  UE Dressing Assistance: Moderate (anticipated)  LE Dressing Assistance: Maximal (pt attempted to don [place] socks but was unsuccessful due to fatigue and difficulty lifitng LEs.  required MAX A for donning socks)  Toileting Assistance with Device: Maximal (anticipated)  Activity Tolerance:  Endurance: Decreased tolerance for upright activites  Bed Mobility/Transfers: Bed Mobility  Bed Mobility: Yes  Bed Mobility 1  Bed Mobility 1: Supine to sitting  Level of Assistance 1: Minimum assistance  Bed Mobility Comments 1: pt able to manage BLE to edge of bed, required MIN A for lifting trunk  Bed Mobility 2  Bed Mobility  2: Sitting to supine  Level of Assistance 2: Minimum assistance  Bed Mobility Comments 2: MIN A for lifting BLE back into bed and for positioning trunk; effortful    Transfers  Transfer: Yes  Transfer 1  Technique 1: Sit to stand, Stand to sit  Transfer Device 1: Walker  Transfer Level of Assistance 1: Minimum assistance  Trials/Comments 1: from elevated ED cart; cues for hand placement and positioning.  required MIN A to lift into standing position; effortful  Transfers 2  Technique 2: Lateral  Transfer Device 2: Walker  Transfer Level of Assistance 2: Moderate assistance  Trials/Comments 2: lateral stepping towards left side with FWW to position self closer to head of bed.  pt requiring MOD A for steady assist due to LE weakness and mild buckling noted.  pt with heavy reliance on BUE for support    Sitting Balance:  Static Sitting Balance  Static Sitting-Balance Support: Feet supported  Static Sitting-Level of Assistance: Close supervision  Standing Balance:  Static Standing Balance  Static Standing-Balance Support: Bilateral upper extremity supported  Static Standing-Level of Assistance: Minimum assistance      Vision:Vision - Basic Assessment  Current Vision: No visual deficits  Sensation:  Light  Touch: No apparent deficits  Strength:  Strength Comments: BUE Grossly 3+/5  Perception:  Inattention/Neglect: Appears intact  Coordination:  Movements are Fluid and Coordinated: No  Upper Body Coordination: decreased rate and accuracy of movements   Hand Function:  Gross Grasp: Functional  Coordination: Functional  Extremities: RUE   RUE : Within Functional Limits and LUE   LUE: Within Functional Limits    Outcome Measures:[facility] Daily Activity  Putting on and taking off regular lower body clothing: A lot  Bathing (including washing, rinsing, drying): A lot  Putting on and taking off regular upper body clothing: A lot  Toileting, which includes using toilet, bedpan or urinal: A lot  Taking care of personal grooming such as brushing teeth: A little  Eating Meals: A little  Daily Activity - Total Score: 14        Education Documentation  Body Mechanics, taught by Apolinar Roth OT at 8/22/2024  2:52 PM.  Learner: Patient  Readiness: Acceptance  Method: Explanation, Demonstration  Response: Needs Reinforcement    ADL Training, taught by Apolinar Roth OT at 8/22/2024  2:52 PM.  Learner: Patient  Readiness: Acceptance  Method: Explanation, Demonstration  Response: Needs Reinforcement    Education Comments  No comments found.        OP EDUCATION:       Goals:  Encounter Problems       Encounter Problems (Active)       ADLs       Patient with complete upper body dressing with supervision level of assistance donning and doffing all UE clothes while edge of bed  (Progressing)       Start:  08/22/24    Expected End:  09/22/24            Patient with complete lower body dressing with supervision level of assistance donning and doffing all LE clothes  with PRN adaptive equipment while edge of bed  (Progressing)       Start:  08/22/24    Expected End:  09/22/24            Patient will complete daily grooming tasks with supervision level of assistance and PRN adaptive equipment while standing. (Progressing)       Start:   08/22/24    Expected End:  09/22/24            Patient will complete toileting including hygiene clothing management/hygiene with supervision level of assistance and grab bars. (Progressing)       Start:  08/22/24    Expected End:  09/22/24               TRANSFERS       Patient will complete functional transfer to toilet/commode with least restrictive device with supervision level of assistance. (Progressing)       Start:  08/22/24    Expected End:  09/22/24

## 2024-08-22 NOTE — H&P
History Of Present Illness  Annetta Miguel is a 89 y.o. female presenting with generalized weakness.  Patient was recently admitted for UTI and generalized weakness along with hyponatremia.  She has a history of stage IV chronic kidney disease and adamantly refuses hemodialysis.  She sees Dr. Littlejohn.  She was very weak at home today could not get out of bed.  She states that she has had a poor appetite and has not been eating right over the past couple of days.  She states that she aches all over and was given morphine in the emergency room.  She denies chest pain or shortness of breath.  X-ray showed right lower lobe infiltrate possibly pneumonia and she was started on Rocephin and azithromycin in the emergency room.  He will need reevaluated by physical therapy for SNF placement.  She currently resides at [facility].     Past Medical History  Past Medical History:   Diagnosis Date    Cervical stenosis of spine     CKD (chronic kidney disease)     HLD (hyperlipidemia)     Hypertension     Insomnia     Osteoporosis        Surgical History  History reviewed. No pertinent surgical history.     Social History  She reports that she has never smoked. She has never been exposed to tobacco smoke. She has never used smokeless tobacco. She reports that she does not currently use alcohol. She reports that she does not use drugs.    Family History  Family History   Problem Relation Name Age of Onset    Stroke Mother      Other (Heart Condition) Father      Diabetes Father      Heart disease Father      Other (Intestinal Complications) Sister      Parkinsonism Brother          Allergies  Oxycodone-acetaminophen, Other, and Oxycodone    Review of Systems  All systems reviewed and negative except as noted in HPI  Physical Exam  Constitutional: Very anxious, calm, cooperative  HEENT: PERRL, normocephalic, atraumatic, mucous membranes dry  Cardiovascular: Regular rhythm and rate,   Respiratory: Expiratory  "wheezing throughout, saturating 97% on room air  Gastrointestinal: Bowel sounds positive x 4, soft, nontender  Neurologic: Alert and oriented x 3 equal strength bilaterally  Musculoskeletal: Able to move all extremities, no edema  Skin: Warm, dry and intact  Last Recorded Vitals  Blood pressure 139/56, pulse 90, temperature 36.7 °C (98.1 °F), temperature source Temporal, resp. rate (!) 24, height 1.575 m (5' 2\"), weight 68 kg (150 lb), SpO2 96%.    Relevant Results             Assessment/Plan   Assessment & Plan  Hyponatremia  Patient currently on sodium bicarb oral daily  Consult nephrology  Right lower lobe pneumonia  Chest x-ray showed infiltrate versus pleural effusion on the right base  Azithromycin and Rocephin daily  Blood cultures and sputum cultures pending  Oxygen as needed for saturation greater 92%  Albuterol as needed for wheezing/shortness of breath  Generalized weakness  Recently discharged back to assisted living at [place]  Was unable to ambulate today  PT/OT to evaluate and treat  Most likely will need SNF for rehab  Stage 4 chronic kidney disease (Multi)  Patient follows with Dr. Antione Littlejohn  Patient adamantly refuses hemodialysis  Creatinine today is 3.4  Nephrology consulted  Mixed hyperlipidemia  Continue home atorvastatin  Primary hypertension  Continue home amlodipine  Iron deficiency anemia due to chronic blood loss  Continue home ferrous sulfate  Anxiety  As needed hydroxyzine  On deep vein thrombosis (DVT) prophylaxis  Subcutaneous heparin           I spent 75 minutes in the professional and overall care of this patient.      Boni Stevens, APRN-CNP    "

## 2024-08-23 ENCOUNTER — APPOINTMENT (OUTPATIENT)
Dept: RADIOLOGY | Facility: HOSPITAL | Age: 89
End: 2024-08-23
Payer: MEDICARE

## 2024-08-23 PROBLEM — N18.5 CHRONIC KIDNEY DISEASE, STAGE V (MULTI): Status: ACTIVE | Noted: 2024-08-23

## 2024-08-23 PROBLEM — R14.0 ABDOMINAL DISTENTION: Status: ACTIVE | Noted: 2024-08-23

## 2024-08-23 LAB
ANION GAP SERPL CALC-SCNC: 15 MMOL/L
BUN SERPL-MCNC: 58 MG/DL (ref 8–25)
CALCIUM SERPL-MCNC: 5.3 MG/DL (ref 8.5–10.4)
CALCIUM SERPL-MCNC: 5.7 MG/DL (ref 8.5–10.4)
CHLORIDE SERPL-SCNC: 91 MMOL/L (ref 97–107)
CO2 SERPL-SCNC: 18 MMOL/L (ref 24–31)
CREAT SERPL-MCNC: 3.1 MG/DL (ref 0.4–1.6)
EGFRCR SERPLBLD CKD-EPI 2021: 13 ML/MIN/1.73M*2
ERYTHROCYTE [DISTWIDTH] IN BLOOD BY AUTOMATED COUNT: 12.4 % (ref 11.5–14.5)
GLUCOSE SERPL-MCNC: 90 MG/DL (ref 65–99)
HCT VFR BLD AUTO: 20.2 % (ref 36–46)
HGB BLD-MCNC: 7 G/DL (ref 12–16)
MAGNESIUM SERPL-MCNC: 1.7 MG/DL (ref 1.6–3.1)
MCH RBC QN AUTO: 31 PG (ref 26–34)
MCHC RBC AUTO-ENTMCNC: 34.7 G/DL (ref 32–36)
MCV RBC AUTO: 89 FL (ref 80–100)
NRBC BLD-RTO: 0 /100 WBCS (ref 0–0)
PHOSPHATE SERPL-MCNC: 2.6 MG/DL (ref 2.5–4.5)
PLATELET # BLD AUTO: 192 X10*3/UL (ref 150–450)
POTASSIUM SERPL-SCNC: 3.7 MMOL/L (ref 3.4–5.1)
RBC # BLD AUTO: 2.26 X10*6/UL (ref 4–5.2)
SODIUM SERPL-SCNC: 124 MMOL/L (ref 133–145)
WBC # BLD AUTO: 10.6 X10*3/UL (ref 4.4–11.3)

## 2024-08-23 PROCEDURE — 83735 ASSAY OF MAGNESIUM: CPT | Performed by: NURSE PRACTITIONER

## 2024-08-23 PROCEDURE — 74018 RADEX ABDOMEN 1 VIEW: CPT

## 2024-08-23 PROCEDURE — 2500000002 HC RX 250 W HCPCS SELF ADMINISTERED DRUGS (ALT 637 FOR MEDICARE OP, ALT 636 FOR OP/ED): Performed by: NURSE PRACTITIONER

## 2024-08-23 PROCEDURE — 36415 COLL VENOUS BLD VENIPUNCTURE: CPT | Performed by: INTERNAL MEDICINE

## 2024-08-23 PROCEDURE — G0316 PR PROLONGED INPATIENT/OBSERVATION EM SVC EA 15 MIN: HCPCS | Performed by: NURSE PRACTITIONER

## 2024-08-23 PROCEDURE — 2500000005 HC RX 250 GENERAL PHARMACY W/O HCPCS: Performed by: NURSE PRACTITIONER

## 2024-08-23 PROCEDURE — 99223 1ST HOSP IP/OBS HIGH 75: CPT | Performed by: NURSE PRACTITIONER

## 2024-08-23 PROCEDURE — 2500000001 HC RX 250 WO HCPCS SELF ADMINISTERED DRUGS (ALT 637 FOR MEDICARE OP): Performed by: REGISTERED NURSE

## 2024-08-23 PROCEDURE — 36415 COLL VENOUS BLD VENIPUNCTURE: CPT | Performed by: NURSE PRACTITIONER

## 2024-08-23 PROCEDURE — 2500000004 HC RX 250 GENERAL PHARMACY W/ HCPCS (ALT 636 FOR OP/ED): Performed by: REGISTERED NURSE

## 2024-08-23 PROCEDURE — 1210000001 HC SEMI-PRIVATE ROOM DAILY

## 2024-08-23 PROCEDURE — 2500000001 HC RX 250 WO HCPCS SELF ADMINISTERED DRUGS (ALT 637 FOR MEDICARE OP): Performed by: NURSE PRACTITIONER

## 2024-08-23 PROCEDURE — 99497 ADVNCD CARE PLAN 30 MIN: CPT | Performed by: NURSE PRACTITIONER

## 2024-08-23 PROCEDURE — 87899 AGENT NOS ASSAY W/OPTIC: CPT | Mod: WESLAB | Performed by: NURSE PRACTITIONER

## 2024-08-23 PROCEDURE — 6350000001 HC RX 635 EPOETIN >10,000 UNITS: Mod: JZ | Performed by: INTERNAL MEDICINE

## 2024-08-23 PROCEDURE — 82310 ASSAY OF CALCIUM: CPT | Performed by: INTERNAL MEDICINE

## 2024-08-23 PROCEDURE — 2500000004 HC RX 250 GENERAL PHARMACY W/ HCPCS (ALT 636 FOR OP/ED): Mod: JZ | Performed by: INTERNAL MEDICINE

## 2024-08-23 PROCEDURE — 97530 THERAPEUTIC ACTIVITIES: CPT | Mod: GP | Performed by: PHYSICAL THERAPIST

## 2024-08-23 PROCEDURE — 92610 EVALUATE SWALLOWING FUNCTION: CPT | Mod: GN | Performed by: SPEECH-LANGUAGE PATHOLOGIST

## 2024-08-23 PROCEDURE — 2500000004 HC RX 250 GENERAL PHARMACY W/ HCPCS (ALT 636 FOR OP/ED): Performed by: NURSE PRACTITIONER

## 2024-08-23 PROCEDURE — 84100 ASSAY OF PHOSPHORUS: CPT | Performed by: NURSE PRACTITIONER

## 2024-08-23 PROCEDURE — 74018 RADEX ABDOMEN 1 VIEW: CPT | Performed by: RADIOLOGY

## 2024-08-23 PROCEDURE — 97110 THERAPEUTIC EXERCISES: CPT | Mod: GP | Performed by: PHYSICAL THERAPIST

## 2024-08-23 PROCEDURE — 85027 COMPLETE CBC AUTOMATED: CPT | Performed by: NURSE PRACTITIONER

## 2024-08-23 PROCEDURE — 97161 PT EVAL LOW COMPLEX 20 MIN: CPT | Mod: GP | Performed by: PHYSICAL THERAPIST

## 2024-08-23 PROCEDURE — 80048 BASIC METABOLIC PNL TOTAL CA: CPT | Performed by: NURSE PRACTITIONER

## 2024-08-23 PROCEDURE — 87449 NOS EACH ORGANISM AG IA: CPT | Mod: WESLAB | Performed by: NURSE PRACTITIONER

## 2024-08-23 RX ORDER — CALCIUM GLUCONATE 20 MG/ML
1 INJECTION, SOLUTION INTRAVENOUS ONCE
Status: COMPLETED | OUTPATIENT
Start: 2024-08-23 | End: 2024-08-23

## 2024-08-23 RX ORDER — ACETAMINOPHEN 500 MG
1000 TABLET ORAL EVERY 8 HOURS
Status: DISCONTINUED | OUTPATIENT
Start: 2024-08-23 | End: 2024-08-24 | Stop reason: HOSPADM

## 2024-08-23 RX ORDER — CALCIUM GLUCONATE 20 MG/ML
2 INJECTION, SOLUTION INTRAVENOUS ONCE
Status: DISCONTINUED | OUTPATIENT
Start: 2024-08-23 | End: 2024-08-23

## 2024-08-23 RX ORDER — MORPHINE SULFATE 2 MG/ML
1 INJECTION, SOLUTION INTRAMUSCULAR; INTRAVENOUS ONCE
Status: COMPLETED | OUTPATIENT
Start: 2024-08-23 | End: 2024-08-23

## 2024-08-23 RX ORDER — LORAZEPAM 2 MG/ML
0.25 INJECTION INTRAMUSCULAR EVERY 4 HOURS PRN
Status: DISCONTINUED | OUTPATIENT
Start: 2024-08-23 | End: 2024-08-23

## 2024-08-23 RX ORDER — LORAZEPAM 2 MG/ML
0.25 INJECTION INTRAMUSCULAR EVERY 4 HOURS PRN
Status: DISCONTINUED | OUTPATIENT
Start: 2024-08-23 | End: 2024-08-24 | Stop reason: HOSPADM

## 2024-08-23 RX ORDER — LIDOCAINE 560 MG/1
2 PATCH PERCUTANEOUS; TOPICAL; TRANSDERMAL DAILY
Status: DISCONTINUED | OUTPATIENT
Start: 2024-08-23 | End: 2024-08-24 | Stop reason: HOSPADM

## 2024-08-23 RX ORDER — ERGOCALCIFEROL 1.25 MG/1
1250 CAPSULE ORAL
Status: DISCONTINUED | OUTPATIENT
Start: 2024-08-25 | End: 2024-08-23

## 2024-08-23 RX ORDER — HYDROCODONE BITARTRATE AND ACETAMINOPHEN 5; 325 MG/1; MG/1
1 TABLET ORAL EVERY 4 HOURS PRN
Status: DISCONTINUED | OUTPATIENT
Start: 2024-08-23 | End: 2024-08-23

## 2024-08-23 RX ORDER — MORPHINE SULFATE 2 MG/ML
2 INJECTION, SOLUTION INTRAMUSCULAR; INTRAVENOUS EVERY 4 HOURS PRN
Status: DISCONTINUED | OUTPATIENT
Start: 2024-08-23 | End: 2024-08-23

## 2024-08-23 ASSESSMENT — PAIN SCALES - GENERAL
PAINLEVEL_OUTOF10: 8
PAINLEVEL_OUTOF10: 10 - WORST POSSIBLE PAIN
PAINLEVEL_OUTOF10: 0 - NO PAIN
PAINLEVEL_OUTOF10: 6
PAINLEVEL_OUTOF10: 0 - NO PAIN
PAINLEVEL_OUTOF10: 6
PAINLEVEL_OUTOF10: 8
PAINLEVEL_OUTOF10: 10 - WORST POSSIBLE PAIN

## 2024-08-23 ASSESSMENT — COGNITIVE AND FUNCTIONAL STATUS - GENERAL
MOVING TO AND FROM BED TO CHAIR: A LOT
MOVING FROM LYING ON BACK TO SITTING ON SIDE OF FLAT BED WITH BEDRAILS: A LITTLE
DRESSING REGULAR UPPER BODY CLOTHING: A LOT
TURNING FROM BACK TO SIDE WHILE IN FLAT BAD: A LOT
PERSONAL GROOMING: A LOT
TURNING FROM BACK TO SIDE WHILE IN FLAT BAD: A LITTLE
WALKING IN HOSPITAL ROOM: TOTAL
MOVING FROM LYING ON BACK TO SITTING ON SIDE OF FLAT BED WITH BEDRAILS: A LOT
STANDING UP FROM CHAIR USING ARMS: A LOT
DAILY ACTIVITIY SCORE: 13
EATING MEALS: A LITTLE
HELP NEEDED FOR BATHING: A LOT
MOVING TO AND FROM BED TO CHAIR: A LITTLE
STANDING UP FROM CHAIR USING ARMS: A LITTLE
TOILETING: A LOT
MOBILITY SCORE: 15
MOBILITY SCORE: 12
CLIMB 3 TO 5 STEPS WITH RAILING: TOTAL
DRESSING REGULAR LOWER BODY CLOTHING: A LOT
CLIMB 3 TO 5 STEPS WITH RAILING: A LOT
WALKING IN HOSPITAL ROOM: A LITTLE

## 2024-08-23 ASSESSMENT — ENCOUNTER SYMPTOMS
ACTIVITY CHANGE: 1
VOMITING: 0
SLEEP DISTURBANCE: 0
FEVER: 0
WHEEZING: 0
DIFFICULTY URINATING: 1
DIARRHEA: 0
DIZZINESS: 0
HEADACHES: 0
NAUSEA: 1
APPETITE CHANGE: 1
CONSTIPATION: 0
ABDOMINAL PAIN: 0
CHILLS: 0
SHORTNESS OF BREATH: 0
CONFUSION: 0
AGITATION: 0
BACK PAIN: 1

## 2024-08-23 ASSESSMENT — PAIN DESCRIPTION - ORIENTATION
ORIENTATION: RIGHT;LOWER
ORIENTATION: MID
ORIENTATION: MID

## 2024-08-23 ASSESSMENT — PAIN - FUNCTIONAL ASSESSMENT
PAIN_FUNCTIONAL_ASSESSMENT: 0-10
PAIN_FUNCTIONAL_ASSESSMENT: 0-10

## 2024-08-23 ASSESSMENT — PAIN DESCRIPTION - LOCATION
LOCATION: BACK

## 2024-08-23 ASSESSMENT — ACTIVITIES OF DAILY LIVING (ADL): ADL_ASSISTANCE: INDEPENDENT

## 2024-08-23 NOTE — PROGRESS NOTES
Occupational Therapy                 Therapy Communication Note    Patient Name: Annetta Miguel  MRN: [id number]  Today's Date: 8/23/2024     Discipline: Occupational Therapy    Missed Visit Reason: Missed Visit Reason: Other (Comment) (Hold therapy interventions at this time per nursing as attempting to get IV access and pallative med to discuss goals of care with pt and family. No OT tx completed this date.)    Missed Time: Attempt at 1444    Comment:

## 2024-08-23 NOTE — PROGRESS NOTES
Annetta Miguel is a 89 y.o. female on day 1 of admission presenting with Hyponatremia.      Subjective   Patient examined lying on right side.  Family was at bedside and son who is a physician was on speaker phone further discussion.  Family is concerned that her belly is distended.  Her abdomen is round and soft but I did relay to them that I would order a KUB to see if anything was going on there.  Dr. Littlejohn and I spoke with the family and we will plan to have palliative care see the patient to discuss goals of care and possibly hospice.  Patient has been adamant with Dr. Littlejohn and myself in the past that she does not under any circumstances want to have dialysis.  Currently, she states that she just feels achy all over.       Objective     Last Recorded Vitals  /50 (BP Location: Right arm, Patient Position: Lying)   Pulse 73   Temp 36.5 °C (97.7 °F) (Oral)   Resp 18   Wt 68 kg (150 lb)   SpO2 100%   Intake/Output last 3 Shifts:    Intake/Output Summary (Last 24 hours) at 8/23/2024 1217  Last data filed at 8/23/2024 0744  Gross per 24 hour   Intake 350 ml   Output --   Net 350 ml       Admission Weight  Weight: 68 kg (150 lb) (08/22/24 0941)    Daily Weight  08/22/24 : 68 kg (150 lb)    Image Results  XR abdomen 1 view  Narrative: Interpreted By:  Nusrat Smith,   STUDY:  XR ABDOMEN 1 VIEW;  8/22/2024 8:37 pm      INDICATION:  Signs/Symptoms:Severe abdominal pain and bloating.      COMPARISON:  None.      ACCESSION NUMBER(S):  [id number]      ORDERING CLINICIAN:  TETO MELLO      FINDINGS:  Supine views of the abdomen. There is a normal bowel-gas pattern.  Degenerative changes are seen in the lumbar spine. There is a  compression fracture of L1. Right upper quadrant surgical clips are  likely due to cholecystectomy. There is a surgical clip in the  pelvis. A right hip replacement is noted. There is narrowing of the  left hip joint. Calcified granuloma left lung base.      Impression: 1.   Nonspecific nonobstructive bowel gas pattern.  2. L1 compression fracture      MACRO:  None      Signed by: Nusrat Smith 8/22/2024 8:46 PM  Dictation workstation:   [id number]  Electrocardiogram, 12-lead PRN ACS symptoms  Normal sinus rhythm  Right bundle branch block  Abnormal ECG  No previous ECGs available  Confirmed by Sav Wall ([id number]) on 8/22/2024 4:27:39 PM  XR chest 1 view  Narrative: Interpreted By:  Italia Ferreira,   STUDY:  XR CHEST 1 VIEW;  8/22/2024 10:53 am      INDICATION:  Signs/Symptoms:generalized weakness.      COMPARISON:  08/17/2024      ACCESSION NUMBER(S):  [id number]      ORDERING CLINICIAN:  HEATHER GAYTAN      FINDINGS:  Artifact from overlying monitoring leads noted. Small density at the  lateral right lung base and partial blunting of the costophrenic  angle. Stable small dense likely calcified nodule in the lateral left  lung base. The cardiac silhouette is within normal limits for size.      Impression: New small infiltrate and/or pleural effusion at the right lung base.      MACRO:  None.      Signed by: Italia Ferreira 8/22/2024 11:23 AM  Dictation workstation:   [id number]  ECG 12 lead  Normal sinus rhythm  Possible Left atrial enlargement  Right bundle branch block  Abnormal ECG  No previous ECGs available  Confirmed by Lucina Ch ([id number]) on 8/22/2024 8:24:50 AM      Physical Exam  Constitutional: No acute distress, calm, cooperative  HEENT: PERRL, normocephalic, atraumatic, mucous membranes dry  Cardiovascular: Regular rhythm and rate,   Respiratory: Lungs clear to auscultation,   Gastrointestinal: Bowel sounds positive x 4, soft, slightly distended, nontender  Neurologic: Alert and oriented x 3 equal strength bilaterally  Musculoskeletal: Able to move all extremities, no edema  Skin: Warm, dry and intact  Relevant Results               Assessment/Plan                  Assessment & Plan  Hyponatremia  Patient currently on sodium bicarb oral daily  Consult nephrology  Right lower  lobe pneumonia  Chest x-ray showed infiltrate versus pleural effusion on the right base  Azithromycin and Rocephin daily  Blood cultures and sputum cultures pending  Oxygen as needed for saturation greater 92%  Albuterol as needed for wheezing/shortness of breath  Generalized weakness  Recently discharged back to assisted living at [place]  Was unable to ambulate today  PT/OT to evaluate and treat  Most likely will need SNF for rehab  Palliative consult for goals of care possible hospice as the patient has stage V kidney disease and refuses dialysis  Chronic kidney disease, stage V (Multi)  Patient follows with Dr. Antione Littlejohn  Patient adamantly refuses hemodialysis  Creatinine today is 3.4  Nephrology consulted  Mixed hyperlipidemia  Continue home atorvastatin  Primary hypertension  Continue home amlodipine  Iron deficiency anemia due to chronic blood loss  Continue home ferrous sulfate  Anxiety  As needed hydroxyzine  On deep vein thrombosis (DVT) prophylaxis  Subcutaneous heparin  Abdominal distention  Family is concerned about her abdomen  Her abdomen is slightly distended but soft and nontender  Will order KUB    Plan/disposition  Has been accepted at [facility] and can go there Monday  Family has agreed to palliative care consult to discuss hospice and goals of care  Discharge to [facility] versus hospice once palliative consult is done                Boni Stevens, APRN-CNP

## 2024-08-23 NOTE — CARE PLAN
The patient's goals for the shift include improve mobility.    The clinical goals for the shift include Pain control    Over the shift, the patient did not make progress toward the following goals. Barriers to progression include . Recommendations to address these barriers include .      Problem: Pain - Adult  Goal: Verbalizes/displays adequate comfort level or baseline comfort level  Outcome: Progressing     Problem: Safety - Adult  Goal: Free from fall injury  Outcome: Progressing     Problem: Discharge Planning  Goal: Discharge to home or other facility with appropriate resources  Outcome: Progressing     Problem: Chronic Conditions and Co-morbidities  Goal: Patient's chronic conditions and co-morbidity symptoms are monitored and maintained or improved  Outcome: Progressing     Problem: Pain  Goal: Takes deep breaths with improved pain control throughout the shift  Outcome: Met  Goal: Turns in bed with improved pain control throughout the shift  Outcome: Met  Goal: Walks with improved pain control throughout the shift  Outcome: Met  Goal: Performs ADL's with improved pain control throughout shift  Outcome: Progressing  Goal: Participates in PT with improved pain control throughout the shift  Outcome: Progressing  Goal: Free from opioid side effects throughout the shift  Outcome: Met  Goal: Free from acute confusion related to pain meds throughout the shift  Outcome: Met     Problem: Skin  Goal: Decreased wound size/increased tissue granulation at next dressing change  8/23/2024 0629 by Amos Corona RN  Outcome: Progressing  8/23/2024 0110 by Amos Corona RN  Outcome: Progressing  Flowsheets (Taken 8/23/2024 0110)  Decreased wound size/increased tissue granulation at next dressing change:   Promote sleep for wound healing   Protective dressings over bony prominences   Utilize specialty bed per algorithm  Goal: Participates in plan/prevention/treatment measures  8/23/2024 0629 by Amos Corona RN  Outcome:  Progressing  8/23/2024 0110 by Amos Corona RN  Outcome: Progressing  Flowsheets (Taken 8/23/2024 0110)  Participates in plan/prevention/treatment measures:   Discuss with provider PT/OT consult   Elevate heels   Increase activity/out of bed for meals  Goal: Prevent/manage excess moisture  8/23/2024 0629 by Amos Corona RN  Outcome: Progressing  8/23/2024 0110 by Amos Corona RN  Outcome: Progressing  Flowsheets (Taken 8/23/2024 0110)  Prevent/manage excess moisture:   Monitor for/manage infection if present   Moisturize dry skin   Cleanse incontinence/protect with barrier cream   Use wicking fabric (obtain order)  Goal: Prevent/minimize sheer/friction injuries  8/23/2024 0629 by Amos Corona RN  Outcome: Progressing  8/23/2024 0110 by Amos Corona RN  Outcome: Progressing  Flowsheets (Taken 8/23/2024 0110)  Prevent/minimize sheer/friction injuries:   Use pull sheet   HOB 30 degrees or less   Utilize specialty bed per algorithm  Goal: Promote/optimize nutrition  8/23/2024 0629 by Amos Corona RN  Outcome: Progressing  8/23/2024 0110 by Amos Corona RN  Outcome: Progressing  Flowsheets (Taken 8/23/2024 0110)  Promote/optimize nutrition:   Assist with feeding   Offer water/supplements/favorite foods   Monitor/record intake including meals  Goal: Promote skin healing  8/23/2024 0629 by Amos Corona RN  Outcome: Progressing  8/23/2024 0110 by Amos Corona RN  Outcome: Progressing  Flowsheets (Taken 8/23/2024 0110)  Promote skin healing:   Protective dressings over bony prominences   Assess skin/pad under line(s)/device(s)   Turn/reposition every 2 hours/use positioning/transfer devices   Ensure correct size (line/device) and apply per  instructions   Rotate device position/do not position patient on device     Problem: Fall/Injury  Goal: Not fall by end of shift  Outcome: Met  Goal: Be free from injury by end of the shift  Outcome: Met  Goal: Verbalize understanding of personal risk factors for fall in the  hospital  Outcome: Progressing  Goal: Verbalize understanding of risk factor reduction measures to prevent injury from fall in the home  Outcome: Progressing  Goal: Use assistive devices by end of the shift  Outcome: Met  Goal: Pace activities to prevent fatigue by end of the shift  Outcome: Progressing        No

## 2024-08-23 NOTE — PROGRESS NOTES
08/23/24 0942   ACS Disability Status   Are you deaf or do you have serious difficulty hearing? Y  (Reports increased difficulty hearing since recent hospital stay.  Does not wear hearing aides.)   Are you blind or do you have serious difficulty seeing, even when wearing glasses? N   Because of a physical, mental, or emotional condition, do you have serious difficulty concentrating, remembering, or making decisions? (5 years old or older) N   Do you have serious difficulty walking or climbing stairs? N   Do you have serious difficulty dressing or bathing? N   Because of a physical, mental, or emotional condition, do you have serious difficulty doing errands alone such as visiting the doctor? N

## 2024-08-23 NOTE — NURSING NOTE
This RN receiving Critical Lab Value, Calcium 5.7, from ......    Nurse Brandi HATHAWAY made aware.. reporting pt already received calcium gluconate 1 g in sodium chloride (iso) IV 50 mL

## 2024-08-23 NOTE — CARE PLAN
Problem: Pain - Adult  Goal: Verbalizes/displays adequate comfort level or baseline comfort level  Outcome: Progressing     Problem: Safety - Adult  Goal: Free from fall injury  Outcome: Progressing     Problem: Chronic Conditions and Co-morbidities  Goal: Patient's chronic conditions and co-morbidity symptoms are monitored and maintained or improved  Outcome: Progressing     Problem: Discharge Planning  Goal: Discharge to home or other facility with appropriate resources  Outcome: Progressing     Problem: Pain  Goal: Performs ADL's with improved pain control throughout shift  Outcome: Progressing  Goal: Participates in PT with improved pain control throughout the shift  Outcome: Progressing   The patient's goals for the shift include improve mobility.    The clinical goals for the shift include Pain control

## 2024-08-23 NOTE — ASSESSMENT & PLAN NOTE
Recently discharged back to assisted living at [place]  Was unable to ambulate today  PT/OT to evaluate and treat  Most likely will need SNF for rehab  Palliative consult for goals of care possible hospice as the patient has stage V kidney disease and refuses dialysis

## 2024-08-23 NOTE — PROGRESS NOTES
Physical Therapy    Physical Therapy Evaluation & Treatment    Patient Name: Annetta Miguel  MRN: [id number]  Today's Date: 8/23/2024   Time Calculation  Start Time: 1027  Stop Time: 1113  Time Calculation (min): 46 min    Assessment/Plan   PT Assessment  PT Assessment Results: Decreased strength, Decreased endurance, Impaired balance, Decreased mobility, Decreased coordination, Decreased safety awareness  Rehab Prognosis: Good  Strengths: Support of extended family/friends, Premorbid level of function, Housing layout, Access to adaptive/assistive products  Barriers to Participation: Comorbidities  End of Session Communication: Bedside nurse (DURING session)  Assessment Comment: She presented with the above listed impairments (see Assessment Results). Pt required minimally increased assist during limited functional mobility compared to her reported baseline. She was a high falls risk. Pt would benefit from continued skilled PT services for maximizing independence and safety prior to & after discharge (moderate intensity).  End of Session Patient Position: Bed, 2 rail up, Alarm off, not on at start of session (call light within reach; dtr at bedside)     IP OR SWING BED PT PLAN  Inpatient or Swing Bed: Inpatient  PT Plan  Treatment/Interventions: Bed mobility, Transfer training, Gait training, Balance training, Strengthening, Endurance training, Therapeutic exercise, Therapeutic activity  PT Plan: Ongoing PT  PT Frequency: 4 times per week  PT Discharge Recommendations: Moderate intensity level of continued care  PT Recommended Transfer Status: Assist x1, Assistive device  PT - OK to Discharge: Yes      Subjective   General Visit Information:  General  Reason for Referral: Impaired functional mobility. This 89 y.o. female presented to ED from home with c/o generalized weakness and inability to get out of bed. She was admitted for R lower lobe infiltrate possibly due to pneumonia, hyponatremia & generalized  weakness. Of note, pt was admitted here 8/17-8/20/24 for UTI, generalized weakness & hyponatremia.    Past Medical History Relevant to Rehab: anxiety, iron deficiency anemia, CKD (stage 4) & refuses HD, HTN, osteoporosis, cevical spinal stenosis    Family/Caregiver Present: Yes (dtr present at start & end of session; she helped to provide home set up & PLOF information)  Prior to Session Communication: Bedside nurse  Patient Position Received: Bed, 3 rail up, Alarm off, not on at start of session  General Comment: RN cleared pt for participation. Pt agreed to session and was fully engaged despite episode of nausea and c/o dizziness in standing which improved with seated rest. Pt requested transfer to [facility] but did not void urine this session.    Home Living:  Type of Home: Independent living (apartment at [place])  Lives With: Alone  Home Adaptive Equipment:  (straigth cane, FWW)  Home Layout: One level  Home Access: Level entry  Bathroom Shower/Tub: Walk-in shower  Bathroom Equipment: None (dtr reported an assessment for adaptive equipment was in proeess)  Home Living Comments: Son & dtr live out of [place]. Dtr staying with pt temporarily.    Prior Level of Function:  Prior Function Per Pt/Caregiver Report  ADL Assistance: Independent  Homemaking Assistance: Independent (Except for lunch & dinner provided by [place]. Pt made a light breakfast for herself.)  Ambulatory Assistance: Independent (Dtr & pt reported 1 fall since Jan 2024.)  Transfers:  (Mod I)  Gait:  (Mod I with FWW. Pt stated using a straigth cane earlier this month and progressed t walker during most recent hospital admission.)  Stairs:  (did not negotiate stairs)  Prior Function Comments: Able to drive but has not driven in ~1 month.    Precautions:  Hearing/Visual Limitations: Hearing WFL; reading glasses  Medical Precautions: Fall precautions    Vital Signs:  Heart Rate: 76  Heart Rate Source: Brachial  BP: (!) 120/46  MAP (mmHg): 60  BP  Location: Left arm  BP Method: Automatic  Patient Position: Sitting at rest. RN aware and cleared pt to remain in bedside chair after session. Pt initially agreed but then politely requested to return to bed. She said she did not feel comfortable in chair but gave no specific complaint.    Objective   Pain:  Pain Assessment  0-10 (Numeric) Pain Score: 0 - No pain    Cognition:  Overall Cognitive Status: Within Functional Limits  Orientation Level: Oriented X4  Following Commands: Follows one step commands consistently    General Assessments:  Activity Tolerance  Endurance: Decreased tolerance for upright activites (due to 1 episode of nausea with minimal watery emesis then c/o dizziness upon standing which improved bit did not resolve with seated rest. RN aware of all signs/symptoms. Diastolic BP low but RN aware and stated similar value earlier this morning.)    Sensation  Not tested; pt denied paresthesias.    ROM  BLE AROM: WFL    Strength  BLE: based on observation of mobiliy, grossly >/=3+/5    Motor Control  Not formally assessed.  Movements are Fluid and Coordinated: No (slower rate and decreased accurary of some BLE movements.)    Postural Control  Within Functional Limits (for age. Fwd head & protracted shoulders.)    Static Sitting Balance  (BLE supported: distant S)  Dynamic Sitting Balance  (BUE & unilateral LE supported: close S/CGA)    Static Standing Balance  (BUE supported on FWW: CGA)  Dynamic Standing Balance  (BUE supported on FWW: CGA/Caitlyn x1)    Functional Assessments:  Bed Mobility  Bed Mobility 1: Supine to sitting  Level of Assistance 1: Minimum assistance (assist to elevate trunk. Cued minimally for sequencing.)  Bed Mobility Comments 1: HOB elevated </=40° and used R bed rail    Bed Mobility  2: Scooting (fwd while seated EOB)  Level of Assistance 2: Contact guard (using BUE)    Bed Mobility 3: Sitting to supine  Level of Assistance 3: Minimum assistance (to elevate LLE)  Bed Mobility  Comments 3: bed flat, no rail, toward R side    Transfers  Technique 1: Sit to stand  Transfer Device 1:  (FWW; EOB or armrests)  Transfer Level of Assistance 1:  (Doni x1 from EOB for lifting torque; CGA from bedside chair. Consistently cued for walker safety/BUE placement.)  Trials/Comments 1: x4 trials: EOB, bedside chair x2 & BSC    Technique 2: Stand to sit  Transfer Device 2:  (FWW; amrests or EOB)  Transfer Level of Assistance 2: Minimum assistance (assist for increased eccentric control. Cued for same & walker safety/BUE placement.)  Trials/Comments 2: as above    Technique 3: Stand pivot (toward R side x2, toward L side x2)  Transfer Device 3:  (FWW; EOB or armrests)  Transfer Level of Assistance 3: Minimum assistance (for steadying at trunk and intermittently for walker management. Cued for sequencing and walker movement.)  Trials/Comments 3: EOB>bedside chair>BSC>bedside chair>EOB       Treatments:  Therapeutic Exercise  Supine: bilat DF/PF x10  Seated EOB, BLE: knee ext, hip abd, hip flex (march) x10 each.    Therapeutic Activity  See above for multiple trials of transfers and cues during functional mobility.    Outcome Measures:  [facility] Basic Mobility  Turning from your back to your side while in a flat bed without using bedrails: A lot  Moving from lying on your back to sitting on the side of a flat bed without using bedrails: A lot  Moving to and from bed to chair (including a wheelchair): A little  Standing up from a chair using your arms (e.g. wheelchair or bedside chair): A little  To walk in hospital room: A little  Climbing 3-5 steps with railing: A lot  Basic Mobility - Total Score: 15    Encounter Problems       Encounter Problems (Active)       Functional Mobility       Pt will transition supine<>sit with mod I.       Start:  08/23/24    Expected End:  09/19/24            Pt will transfer sit<>stand with FWW & mod I.       Start:  08/23/24    Expected End:  09/19/24            Pt will transfer  bed<>hair with FWW & mod I.       Start:  08/23/24    Expected End:  09/19/24            Pt will ambulate >/=100 ft with FWW & mod I.       Start:  08/23/24    Expected End:  09/19/24                   Education Documentation  Mobility Training, taught by Nely Sky, PT at 8/23/2024 12:21 PM.  Learner: Patient  Readiness: Acceptance  Method: Explanation  Response: Needs Reinforcement    Education Comments  No comments found.

## 2024-08-23 NOTE — NURSING NOTE
Received critical result of Calcium 5.3, from laboratory technician Tianna of [facility].  Doctor Rik, Hospitalist informed through secured chat.  Waiting for orders.

## 2024-08-23 NOTE — CONSULTS
"Nutrition Assessement Note    Nutrition Assessment         Pt was recently discharged from [facility] on 8/20/24 and readmitted due to weakness. Pt has CKD stage IV and refuses dialysis. Will provide Mighty shake TID.     Reason for Hospital Admission:  Annetta Miguel is a 89 y.o. female who is admitted for hyponatremia.     Past Medical History:   Diagnosis Date    Cervical stenosis of spine     CKD (chronic kidney disease)     HLD (hyperlipidemia)     Hypertension     Insomnia     Osteoporosis       History reviewed. No pertinent surgical history.    Nutrition History:  Food and Nutrient History: patient repors low appetite for a few weeks  Energy Intake: Poor < 50 %  Food Allergies/Intolerances:  None    Anthropometrics:  Ht: 157.5 cm (5' 2\"), Wt: 68 kg (150 lb), BMI: 27.43             Weight Change:  Daily Weight  08/22/24 : 68 kg (150 lb)  08/20/24 : 68.2 kg (150 lb 4.8 oz)  08/12/24 : 60.4 kg (133 lb 3.2 oz)  06/14/24 : 62.8 kg (138 lb 6.4 oz)  05/22/24 : 63.2 kg (139 lb 6.4 oz)  04/22/24 : 63.5 kg (140 lb)  10/23/23 : 66 kg (145 lb 6.4 oz)  04/18/23 : 65 kg (143 lb 3.2 oz)  10/19/22 : 68.5 kg (151 lb)  04/20/22 : 67.1 kg (148 lb)     Weight History / % Weight Change: Patient reports stable weight             Nutrition Focused Physical Exam Findings:                       Nutrition Significant Labs:  Lab Results   Component Value Date    WBC 10.6 08/23/2024    HGB 7.0 (L) 08/23/2024    HCT 20.2 (L) 08/23/2024     08/23/2024    CHOL 117 (L) 10/12/2022    TRIG 128 10/12/2022    HDL 38 (L) 10/12/2022    ALT 15 08/22/2024    AST 17 08/22/2024     (L) 08/23/2024    K 3.7 08/23/2024    CL 91 (L) 08/23/2024    CREATININE 3.10 (H) 08/23/2024    BUN 58 (H) 08/23/2024    CO2 18 (L) 08/23/2024    TSH 1.29 11/05/2018    INR 1.1 08/22/2024    HGBA1C 5.7 (H) 08/17/2024    ALBUR 209 (H) 05/25/2018     Nutrition Specific Medications:  amLODIPine, 10 mg, oral, Daily  atorvastatin, 10 mg, oral, " Nightly  azithromycin, 500 mg, intravenous, q24h  calcitriol, 0.25 mcg, oral, Every other day  cefTRIAXone, 2 g, intravenous, q24h  cycloSPORINE, 1 drop, Both Eyes, Daily  ferrous sulfate (325 mg ferrous sulfate), 1 tablet, oral, Daily with breakfast  heparin (porcine), 5,000 Units, subcutaneous, q8h  sevelamer carbonate, 800 mg, oral, TID  sodium bicarbonate, 650 mg, oral, 4x daily      Dietary Orders (From admission, onward)       Start     Ordered    08/22/24 1322  Adult diet Regular, Renal; Potassium Restricted 2 gm (50mEq); 2 - 3 grams Sodium  Diet effective now        Question Answer Comment   Diet type Regular    Diet type Renal    Potassium restriction: Potassium Restricted 2 gm (50mEq)    Sodium restriction: 2 - 3 grams Sodium        08/22/24 1321                   Estimated Needs:   Estimated Energy Needs  Total Energy Estimated Needs (kCal): 1700 kCal  Total Estimated Energy Need per Day (kCal/kg): 25 kCal/kg  Method for Estimating Needs: actual wt    Estimated Protein Needs  Total Protein Estimated Needs (g):  (54-68)  Total Protein Estimated Needs (g/kg): 1 g/kg  Method for Estimating Needs: actual wt    Estimated Fluid Needs  Total Fluid Estimated Needs (mL): 1700 mL  Method for Estimating Needs: 1 mL/kcal        Nutrition Diagnosis   Nutrition Diagnosis:       Nutrition Diagnosis  Patient has Nutrition Diagnosis: Yes  Diagnosis Status (1): New  Nutrition Diagnosis 1: Inadequate energy intake  Related to (1): low appetite       Nutrition Interventions/Recommendations   Nutrition Interventions and Recommendations:    Nutrition Prescription:  Individualized Nutrition Prescription Provided for : 1700 kcals, 54-68 g protein via diet    Nutrition Interventions:   Food and/or Nutrient Delivery Interventions  Interventions: Meals and snacks, Medical food supplement  Meals and Snacks: General healthful diet  Goal: recommend a regular diet  Medical Food Supplement: Modified beverage  Goal: mighty shake TID to  provide 200 kcals and 7g protein each    Education Documentation  No documentation found.           Nutrition Monitoring and Evaluation   Monitoring/Evaluation:   Food/Nutrient Related History Monitoring  Monitoring and Evaluation Plan: Energy intake  Energy Intake: Estimated energy intake  Criteria: pt to consume >/= 75% estimated needs         Time Spent/Follow-up:   Follow Up  Time Spent (min): 30 minutes  Last Date of Nutrition Visit: 08/23/24  Nutrition Follow-Up Needed?: 5-7 days  Follow up Comment: 8/28/24

## 2024-08-23 NOTE — PROGRESS NOTES
08/23/24 0943   Discharge Planning   Living Arrangements Alone   Support Systems Children   Assistance Needed two meals  a day provided by [facility]   Type of Residence Private residence   Number of Stairs to Enter Residence 0   Number of Stairs Within Residence 0   Do you have animals or pets at home? No   Home or Post Acute Services Post acute facilities (Rehab/SNF/etc)   Type of Post Acute Facility Services Skilled nursing   Expected Discharge Disposition SNF  ([facility])     Met with patient and her daughter at bedside.  An explanation of discharge planning was provided. Patient resides in independent living apartment at [facility].  [facility] provides lunch and dinner daily. Patient is able to make a light breakfast for herself.  Patient ambulates with a walker. Patient is able to bathe independently.  Patient is very active. Still drives and plays Bridge regularly. Patient has Stage 5 kidney disease but refuses HD.  Follows with Dr. Littlejohn  PCP is Dr. Renae Bhakta.   Patient has had a few recent falls. Patient does not smoke or drink alcohol.  POA is her son Christopher.  A copy of the paperwork was placed on the chart.  Son lives in [place] and daughter lives in [place] however daughter is now at bedside. Both patient and daughter are in agreement with SNF and provided choice of [facility].  Referral sent via [facility].  Will need PT/OT andrea.     Accepted by [facility].  A bed will not be available until Monday.

## 2024-08-23 NOTE — NURSING NOTE
Assumed patient care. BSSR received from previous Nurse. Seen patient lying on bed awake, no distress or discomfort noted. Daughter was in the room. Safety measures ensured and call light in reach.   Plan of care ongoing.

## 2024-08-23 NOTE — PROGRESS NOTES
Speech-Language Pathology    Speech-Language Pathology Clinical Swallow Evaluation    Patient Name: Annetta Miguel  MRN: [id number]  : 1930  Today's Date: 24  Start Time: 1358  Stop Time: 1421  Time Calculation (min): 23 min      ASSESSMENT  Impressions:   Normal oral phase and no suspected pharyngeal phase dysphagia based on clinical swallow evaluation. Pt would benefit from skilled ST to minimize aspiration risk and ensure ongoing safety with the least restrictive diet.  Prognosis: Good    PLAN  Recommendations:  MBSS recommended: SLP will continue to monitor to determine if MBSS is clinically warranted.  Solid consistency: Regular (IDDSI level 7)  Liquid consistency: Thin (IDDSI 0)  Medication administration: Whole, in thin liquid, in puree  Compensatory swallow strategies:  - Slow rate of intake  - Small bites  - Small sips    Recommended frequency/duration:  Skilled SLP services recommended: Yes  Frequency: 1x/week  Duration: 1 week  Discharge recommendation: Unable to determine at this time; please see follow-up notes for DC recommendation.  Treatment/Interventions: Assess diet tolerance, Patient/family education  Strengths: Family/caregiver support  Barriers to participation in tx: Age and Comorbidities    Goals:  In 1 week...  Pt will consume trials of upgraded textures without overt s/sx aspiration in order for consideration of diet texture upgrade.  Pt will demonstrate follow-through of trained compensatory strategies during a meal/snack with 90% acc independently.   GOAL START: 2024    GOAL END: 2024   Status: Goal initiated this date   Progress this date: CSE completed      SUBJECTIVE    PMHx relevant to rehab: Principal Problem:    Hyponatremia  Active Problems:    Mixed hyperlipidemia    Generalized weakness    Right lower lobe pneumonia    Primary hypertension    Iron deficiency anemia due to chronic blood loss    Anxiety    On deep vein thrombosis (DVT) prophylaxis     Chronic kidney disease, stage V (Multi)    Abdominal distention      Chief complaint: Pt was admitted on 8/23/24 due to   Chief Complaint   Patient presents with    Weakness, Gen   . She was found to have Hyponatremia.  She states that she has had a poor appetite and has not been eating right over the past couple of days.  Relevant imaging results:  CXR 8/22/24:  IMPRESSION:  New small infiltrate and/or pleural effusion at the right lung base.      General Visit Information:     Patient Class: Inpatient  Living Environment: Assissted living        Prior to Session Communication: Bedside nurse    RN cleared pt to participate in session and reported pt choked on piece of sausage this morning and then vomited, concerned about aspiration    Pt and daughter reported pt has no h/o dysphagia, pt also reports that she currently still feels a bit nauseous but is thirsty, mouth is dry    Date of Onset: 08/22/24  Date of Order: 08/22/24  [employer] Diet: regular and thin liquids  Current Diet : regular and thin liquids    Status at time of evaluation:  Pain Assessment  Pain Assessment: 0-10  0-10 (Numeric) Pain Score: 8    Pt was alert, pleasant, and cooperative for session.  Orientation: Oriented to self, Oriented to situation, and Ox4  Ability to follow functional commands: WFL  Nutritional status: Appears well-nourished/no concerns    Respiratory status: Room air  Baseline Vocal Quality: Normal  Volitional Cough: Strong  Volitional Swallow: Within Functional Limits  Patient positioning: Upright in bed      OBJECTIVE  Clinical swallow evaluation completed and consisted of interview, oral motor assessment, and PO trials (4 oz thin liquids via cup and straw, 3 tsp puree and 1 virgilio cracker).  ORAL PHASE: Natural dentition in good condition. Oral mucosa were pink, moist, and free of obvious lesions. Lingual strength and ROM were WFL. Labial strength/ROM were WFL. Labial seal was adequate. Mastication of regular solids was WFL  A/P transit and oral clearance were adequate.  PHARYNGEAL PHASE: Laryngeal elevation was visualized or palpated with all trials, however adequacy of hyolaryngeal elevation/excursion cannot be determined at bedside. No immediate or delayed s/sx aspiration/penetration were observed with any consistencies.    Was 3oz challenge administered: Yes; pt unable to successfully complete due to taking breaks. No overt s/sx aspiration were observed. Pt c/o nausea      Treatment/Education:  Results and recommendations were relayed to: Patient, Family, and Bedside nurse  Education provided: Yes   Learner: Patient   Barriers to learning: Acuteness of illness barrier   Method of teaching: Verbal   Topic: role of ST, results of assessment, and recommended safe swallow strategies   Outcome of teaching: Pt/family demonstrated good understanding  Treatment provided: No  Next Treatment Priority: diet tolerance, compensatory strategies

## 2024-08-23 NOTE — ASSESSMENT & PLAN NOTE
Family is concerned about her abdomen  Her abdomen is slightly distended but soft and nontender  Will order KUB    Plan/disposition  Has been accepted at [facility] and can go there Monday  Family has agreed to palliative care consult to discuss hospice and goals of care  Discharge to [facility] versus hospice once palliative consult is done

## 2024-08-23 NOTE — CONSULTS
.Reason For Consult  Chronic kidney disease stage V and electrolyte imbalance    History Of Present Illness  Annetta Miguel is a 89 y.o. female who is very well-known to my practice with underlying CKD stage V currently made it clear to me she does not want dialysis if it comes down to it.  Patient was just discharged from the hospital recently readmitted with lysed weakness and slurred speech patient also has extremely poor appetite choking on food nauseous but no vomiting chest x-ray showed small infiltrate in the right base     Review of Systems    10 point review of system was done all negative except was positive with history of present illness past Medical History  She has a past medical history of Cervical stenosis of spine, CKD (chronic kidney disease), HLD (hyperlipidemia), Hypertension, Insomnia, and Osteoporosis.    Surgical History  She has no past surgical history on file.     Social History  She reports that she has never smoked. She has never been exposed to tobacco smoke. She has never used smokeless tobacco. She reports that she does not currently use alcohol. She reports that she does not use drugs.    Family History  Family History   Problem Relation Name Age of Onset    Stroke Mother      Other (Heart Condition) Father      Diabetes Father      Heart disease Father      Other (Intestinal Complications) Sister      Parkinsonism Brother          Current Facility-Administered Medications:     acetaminophen (Tylenol) tablet 650 mg, 650 mg, oral, q4h PRN, 650 mg at 08/23/24 0318 **OR** acetaminophen (Tylenol) oral liquid 650 mg, 650 mg, oral, q4h PRN **OR** acetaminophen (Tylenol) suppository 650 mg, 650 mg, rectal, q4h PRN, CLIFF Guerrero    albuterol 2.5 mg /3 mL (0.083 %) nebulizer solution 2.5 mg, 2.5 mg, nebulization, q6h PRN, CLIFF Guerrero    alum-mag hydroxide-simeth (Mylanta) 200-200-20 mg/5 mL oral suspension 30 mL, 30 mL, oral, 4x daily PRN, Boni D Marsh,  APRN-CNP    amLODIPine (Norvasc) tablet 10 mg, 10 mg, oral, Daily, CLIFF Guerrero, 10 mg at 08/23/24 0856    atorvastatin (Lipitor) tablet 10 mg, 10 mg, oral, Nightly, CLIFF Guerrero, 10 mg at 08/22/24 2017    azithromycin 500 mg in dextrose 5% 250 mL IV, 500 mg, intravenous, q24h, CLIFF Guerrero    bisacodyl (Dulcolax) EC tablet 10 mg, 10 mg, oral, Daily PRN, CLIFF Guerrero    calcitriol (Rocaltrol) capsule 0.25 mcg, 0.25 mcg, oral, Every other day, CLIFF Guerrero, 0.25 mcg at 08/23/24 0856    cefTRIAXone (Rocephin) 2 g in dextrose (iso) IV 50 mL, 2 g, intravenous, q24h, CLIFF Guerrero    cycloSPORINE (Restasis) 0.05 % ophthalmic emulsion 1 drop, 1 drop, Both Eyes, Daily, CLIFF Guerrero, 1 drop at 08/23/24 1052    ferrous sulfate (325 mg ferrous sulfate) tablet 1 tablet, 1 tablet, oral, Daily with breakfast, CLIFF Guerrero, 1 tablet at 08/23/24 0856    heparin (porcine) injection 5,000 Units, 5,000 Units, subcutaneous, q8h, CLIFF Guerrero, 5,000 Units at 08/23/24 0530    HYDROcodone-acetaminophen (Norco) 5-325 mg per tablet 1 tablet, 1 tablet, oral, q4h PRN, CLIFF Frias, 1 tablet at 08/23/24 0734    hydrOXYzine HCL (Atarax) tablet 25 mg, 25 mg, oral, q6h PRN, CLIFF Guerrero, 25 mg at 08/23/24 0856    melatonin tablet 3 mg, 3 mg, oral, Nightly PRN, CLIFF Guerrero    morphine injection 2 mg, 2 mg, intravenous, q4h PRN, CLIFF Guerrero    ondansetron (Zofran) tablet 4 mg, 4 mg, oral, q8h PRN **OR** ondansetron (Zofran) injection 4 mg, 4 mg, intravenous, q8h PRN, CLIFF Guerrero    oxygen (O2) therapy, , inhalation, Continuous PRN - O2/gases, CLIFF Guerrero    polyvinyl alcohol (Liquifilm Tears) 1.4 % ophthalmic solution 1 drop, 1 drop, Both Eyes, PRN, CLIFF Guerrero    sevelamer carbonate (Renvela) tablet 800 mg, 800 mg, oral, TID, CLIFF Guerrero, 800 mg at  08/23/24 0856    sodium bicarbonate tablet 650 mg, 650 mg, oral, 4x daily, Boni Stevens, APRN-CNP, 650 mg at 08/23/24 0618   Allergies  Oxycodone-acetaminophen, Other, and Oxycodone         Physical Exam  Physical Exam  Constitutional:       General: She is not in acute distress.     Appearance: She is not toxic-appearing.   HENT:      Head: Normocephalic and atraumatic.   Eyes:      Extraocular Movements: Extraocular movements intact.      Pupils: Pupils are equal, round, and reactive to light.   Neck:      Vascular: No carotid bruit.   Cardiovascular:      Rate and Rhythm: Normal rate and regular rhythm.   Pulmonary:      Effort: No respiratory distress.      Breath sounds: No stridor. No wheezing, rhonchi or rales.   Chest:      Chest wall: No tenderness.   Abdominal:      General: There is no distension.      Palpations: There is no mass.      Tenderness: There is no abdominal tenderness. There is no right CVA tenderness, left CVA tenderness or guarding.      Hernia: No hernia is present.   Musculoskeletal:         General: No swelling or tenderness.      Cervical back: No rigidity.      Right lower leg: No edema.      Left lower leg: No edema.   Lymphadenopathy:      Cervical: No cervical adenopathy.   Skin:     General: Skin is warm and dry.      Coloration: Skin is not jaundiced or pale.      Findings: No bruising or erythema.   Neurological:      General: No focal deficit present.      Mental Status: She is alert and oriented to person, place, and time.   Psychiatric:         Mood and Affect: Mood normal.         Behavior: Behavior normal.              I&O 24HR    Intake/Output Summary (Last 24 hours) at 8/23/2024 1219  Last data filed at 8/23/2024 0744  Gross per 24 hour   Intake 350 ml   Output --   Net 350 ml       Vitals 24HR  Heart Rate:  [70-90]   Temp:  [35.8 °C (96.5 °F)-36.5 °C (97.7 °F)]   Resp:  [12-24]   BP: (101-139)/(46-73)   SpO2:  [94 %-100 %]       Relevant Results        Results for orders  placed or performed during the hospital encounter of 08/22/24 (from the past 96 hour(s))   CBC and Auto Differential   Result Value Ref Range    WBC 13.7 (H) 4.4 - 11.3 x10*3/uL    nRBC 0.0 0.0 - 0.0 /100 WBCs    RBC 2.50 (L) 4.00 - 5.20 x10*6/uL    Hemoglobin 7.8 (L) 12.0 - 16.0 g/dL    Hematocrit 22.2 (L) 36.0 - 46.0 %    MCV 89 80 - 100 fL    MCH 31.2 26.0 - 34.0 pg    MCHC 35.1 32.0 - 36.0 g/dL    RDW 12.5 11.5 - 14.5 %    Platelets 193 150 - 450 x10*3/uL    Neutrophils % 88.2 40.0 - 80.0 %    Immature Granulocytes %, Automated 1.8 (H) 0.0 - 0.9 %    Lymphocytes % 5.0 13.0 - 44.0 %    Monocytes % 4.2 2.0 - 10.0 %    Eosinophils % 0.7 0.0 - 6.0 %    Basophils % 0.1 0.0 - 2.0 %    Neutrophils Absolute 12.07 (H) 1.60 - 5.50 x10*3/uL    Immature Granulocytes Absolute, Automated 0.25 0.00 - 0.50 x10*3/uL    Lymphocytes Absolute 0.68 (L) 0.80 - 3.00 x10*3/uL    Monocytes Absolute 0.58 0.05 - 0.80 x10*3/uL    Eosinophils Absolute 0.09 0.00 - 0.40 x10*3/uL    Basophils Absolute 0.02 0.00 - 0.10 x10*3/uL   Comprehensive metabolic panel   Result Value Ref Range    Glucose 107 (H) 65 - 99 mg/dL    Sodium 122 (L) 133 - 145 mmol/L    Potassium 4.0 3.4 - 5.1 mmol/L    Chloride 90 (L) 97 - 107 mmol/L    Bicarbonate 19 (L) 24 - 31 mmol/L    Urea Nitrogen 59 (H) 8 - 25 mg/dL    Creatinine 3.30 (H) 0.40 - 1.60 mg/dL    eGFR 12 (L) >60 mL/min/1.73m*2    Calcium 5.8 (LL) 8.5 - 10.4 mg/dL    Albumin 2.8 (L) 3.5 - 5.0 g/dL    Alkaline Phosphatase 74 35 - 125 U/L    Total Protein 5.3 (L) 5.9 - 7.9 g/dL    AST 17 5 - 40 U/L    Bilirubin, Total 0.3 0.1 - 1.2 mg/dL    ALT 15 5 - 40 U/L    Anion Gap 13 <=19 mmol/L   APTT   Result Value Ref Range    aPTT 22.2 22.0 - 32.5 seconds   Protime-INR   Result Value Ref Range    Protime 11.4 9.3 - 12.7 seconds    INR 1.1 0.9 - 1.2   Blood Culture    Specimen: Peripheral Venipuncture; Blood culture   Result Value Ref Range    Blood Culture Loaded on Instrument - Culture in progress    Blood Culture     Specimen: Peripheral Venipuncture; Blood culture   Result Value Ref Range    Blood Culture Loaded on Instrument - Culture in progress    BLOOD GAS LACTIC ACID, VENOUS   Result Value Ref Range    POCT Lactate, Venous 0.7 0.4 - 2.0 mmol/L   CBC   Result Value Ref Range    WBC 10.6 4.4 - 11.3 x10*3/uL    nRBC 0.0 0.0 - 0.0 /100 WBCs    RBC 2.26 (L) 4.00 - 5.20 x10*6/uL    Hemoglobin 7.0 (L) 12.0 - 16.0 g/dL    Hematocrit 20.2 (L) 36.0 - 46.0 %    MCV 89 80 - 100 fL    MCH 31.0 26.0 - 34.0 pg    MCHC 34.7 32.0 - 36.0 g/dL    RDW 12.4 11.5 - 14.5 %    Platelets 192 150 - 450 x10*3/uL   Basic metabolic panel   Result Value Ref Range    Glucose 90 65 - 99 mg/dL    Sodium 124 (L) 133 - 145 mmol/L    Potassium 3.7 3.4 - 5.1 mmol/L    Chloride 91 (L) 97 - 107 mmol/L    Bicarbonate 18 (L) 24 - 31 mmol/L    Urea Nitrogen 58 (H) 8 - 25 mg/dL    Creatinine 3.10 (H) 0.40 - 1.60 mg/dL    eGFR 13 (L) >60 mL/min/1.73m*2    Calcium 5.3 (LL) 8.5 - 10.4 mg/dL    Anion Gap 15 <=19 mmol/L   Magnesium   Result Value Ref Range    Magnesium 1.70 1.60 - 3.10 mg/dL   Phosphorus   Result Value Ref Range    Phosphorus 2.6 2.5 - 4.5 mg/dL   Calcium   Result Value Ref Range    Calcium 5.7 (LL) 8.5 - 10.4 mg/dL          Assessment/Plan     XR abdomen 1 view    Result Date: 8/22/2024  Interpreted By:  Nusrat Smith, STUDY: XR ABDOMEN 1 VIEW;  8/22/2024 8:37 pm   INDICATION: Signs/Symptoms:Severe abdominal pain and bloating.   COMPARISON: None.   ACCESSION NUMBER(S): [id number]   ORDERING CLINICIAN: TETO MELLO   FINDINGS: Supine views of the abdomen. There is a normal bowel-gas pattern. Degenerative changes are seen in the lumbar spine. There is a compression fracture of L1. Right upper quadrant surgical clips are likely due to cholecystectomy. There is a surgical clip in the pelvis. A right hip replacement is noted. There is narrowing of the left hip joint. Calcified granuloma left lung base.       1.  Nonspecific nonobstructive bowel gas  pattern. 2. L1 compression fracture   MACRO: None   Signed by: Nusrat Smith 8/22/2024 8:46 PM Dictation workstation:   [id number]    XR chest 1 view    Result Date: 8/22/2024  Interpreted By:  Italia Ferreira, STUDY: XR CHEST 1 VIEW;  8/22/2024 10:53 am   INDICATION: Signs/Symptoms:generalized weakness.   COMPARISON: 08/17/2024   ACCESSION NUMBER(S): [id number]   ORDERING CLINICIAN: HEATHER GAYTAN   FINDINGS: Artifact from overlying monitoring leads noted. Small density at the lateral right lung base and partial blunting of the costophrenic angle. Stable small dense likely calcified nodule in the lateral left lung base. The cardiac silhouette is within normal limits for size.       New small infiltrate and/or pleural effusion at the right lung base.   MACRO: None.   Signed by: Italia Ferreira 8/22/2024 11:23 AM Dictation workstation:   [id number]       Impression:  Chronic kidney disease stage V with uremia  Hyponatremia  Hypocalcemia secondary to chronic kidney disease and vitamin D deficiency  Metabolic acidosis  Pneumonia  Anemia of chronic kidney disease    Recommendations:  Since the patient n.p.o. I will start her on gentle IV hydration  Start vitamin D  Will give 1 g of calcium gluconate IV  EPOGEN therapy  Since patient does not want dialysis therapy or aggressive measures I recommend palliative care and hospice consult I discussed the case with Boni the nurse practitioner involved in her case  Discussed with the family    Poor prognosis    Thank you for your consultation    Laly Jovel

## 2024-08-23 NOTE — CONSULTS
Inpatient consult to Palliative Care  Consult performed by: CLIFF Parker  Consult ordered by: CLIFF Guerrero      Patient Location   [facility] [address]  Reason For Consult  Reason for Consult: communication / medical decision making and symptom management     History Of Present Illness  Annetta Miguel is a 89 y.o. female with past medical history of Kidney Disease who presented to the ED 8/22 with weakness and poor appetite. She had c/o of aching all over. CXR notable for rll infiltrate possible pneumonia, she was initiated on Rocephin and azithromycin. The patient also notably has ESRD but repeatedly adamantly refused dialysis. This is her 3rd time to the hospital the month of August alone. She [facility] a [facility]. Palliative med was consulted for [facility].      Past Medical History  She has a past medical history of Cervical stenosis of spine, CKD (chronic kidney disease), HLD (hyperlipidemia), Hypertension, Insomnia, and Osteoporosis.    Surgical History  She has no past surgical history on file.     Social History  She reports that she has never smoked. She has never been exposed to tobacco smoke. She has never used smokeless tobacco. She reports that she does not currently use alcohol. She reports that she does not use drugs.    Caregiving/Caregiver Support  Does the patient require assistance in some or all components of his care, including coordination of medical care? Yes  If Yes, which person serves that role?  daughter   Caregiver emotional or practical needs:  emotional and practical support     Family History  Family History   Problem Relation Name Age of Onset    Stroke Mother      Other (Heart Condition) Father      Diabetes Father      Heart disease Father      Other (Intestinal Complications) Sister      Parkinsonism Brother         Allergies  Oxycodone-acetaminophen, Other, and Oxycodone    Scheduled medications  acetaminophen, 1,000 mg, oral,  q8h  cycloSPORINE, 1 drop, Both Eyes, Daily  heparin (porcine), 5,000 Units, subcutaneous, q8h  lidocaine, 2 patch, transdermal, Daily      Continuous medications     PRN medications  PRN medications: albuterol, alum-mag hydroxide-simeth, bisacodyl, HYDROmorphone, hydrOXYzine HCL, LORazepam, melatonin, ondansetron **OR** ondansetron, oxygen, polyvinyl alcohol     Relevant Results  Hb 7.0, wbc 10.6, Na 124, Cl 91, bun 58, creat 3.10  Results for orders placed or performed during the hospital encounter of 08/22/24 (from the past 24 hour(s))   CBC   Result Value Ref Range    WBC 10.6 4.4 - 11.3 x10*3/uL    nRBC 0.0 0.0 - 0.0 /100 WBCs    RBC 2.26 (L) 4.00 - 5.20 x10*6/uL    Hemoglobin 7.0 (L) 12.0 - 16.0 g/dL    Hematocrit 20.2 (L) 36.0 - 46.0 %    MCV 89 80 - 100 fL    MCH 31.0 26.0 - 34.0 pg    MCHC 34.7 32.0 - 36.0 g/dL    RDW 12.4 11.5 - 14.5 %    Platelets 192 150 - 450 x10*3/uL   Basic metabolic panel   Result Value Ref Range    Glucose 90 65 - 99 mg/dL    Sodium 124 (L) 133 - 145 mmol/L    Potassium 3.7 3.4 - 5.1 mmol/L    Chloride 91 (L) 97 - 107 mmol/L    Bicarbonate 18 (L) 24 - 31 mmol/L    Urea Nitrogen 58 (H) 8 - 25 mg/dL    Creatinine 3.10 (H) 0.40 - 1.60 mg/dL    eGFR 13 (L) >60 mL/min/1.73m*2    Calcium 5.3 (LL) 8.5 - 10.4 mg/dL    Anion Gap 15 <=19 mmol/L   Magnesium   Result Value Ref Range    Magnesium 1.70 1.60 - 3.10 mg/dL   Phosphorus   Result Value Ref Range    Phosphorus 2.6 2.5 - 4.5 mg/dL   Calcium   Result Value Ref Range    Calcium 5.7 (LL) 8.5 - 10.4 mg/dL      XR abdomen 1 view  Result Date: 8/23/2024  Interpreted By:  Sujata Dickson, STUDY: XR ABDOMEN 1 VIEW 8/23/2024 12:59 pm   INDICATION: Signs/Symptoms:abdominal distention   COMPARISON: 08/22/2024   ACCESSION NUMBER(S): [id number]   ORDERING CLINICIAN: LOUISE LEVINE   TECHNIQUE: Signal AP view abdomen   FINDINGS: Multiple gas-filled loops of small bowel identified measuring up to 3.2 cm. There is gas and stool filled portions of the  colon. There is no evidence for free air. No air-fluid level seen.   Multilevel degenerative discogenic changes throughout the thoracolumbar spine with multilevel endplate sclerosis and loss of disc space height with vacuum phenomenon.   Right total hip arthroplasty in place.       1. Gas-filled loops of small bowel with component of findings as seen on prior examination appearing slightly more conspicuous with component of partial small bowel obstruction not excluded. There is gas and stool filled colon   Signed by: Sujata Dickson 8/23/2024 2:55 PM Dictation workstation:   [id number]    XR abdomen 1 view  Result Date: 8/22/2024  Interpreted By:  Nusrat Smith, STUDY: XR ABDOMEN 1 VIEW;  8/22/2024 8:37 pm   INDICATION: Signs/Symptoms:Severe abdominal pain and bloating.   COMPARISON: None.   ACCESSION NUMBER(S): [id number]   ORDERING CLINICIAN: TETO MELLO   FINDINGS: Supine views of the abdomen. There is a normal bowel-gas pattern. Degenerative changes are seen in the lumbar spine. There is a compression fracture of L1. Right upper quadrant surgical clips are likely due to cholecystectomy. There is a surgical clip in the pelvis. A right hip replacement is noted. There is narrowing of the left hip joint. Calcified granuloma left lung base.       1.  Nonspecific nonobstructive bowel gas pattern. 2. L1 compression fracture   MACRO: None   Signed by: Nusrat Smith 8/22/2024 8:46 PM Dictation workstation:   [id number]      XR chest 1 view  Result Date: 8/22/2024  Interpreted By:  Italia Ferreira, STUDY: XR CHEST 1 VIEW;  8/22/2024 10:53 am   INDICATION: Signs/Symptoms:generalized weakness.   COMPARISON: 08/17/2024   ACCESSION NUMBER(S): [id number]   ORDERING CLINICIAN: HEATHER GAYTAN   FINDINGS: Artifact from overlying monitoring leads noted. Small density at the lateral right lung base and partial blunting of the costophrenic angle. Stable small dense likely calcified nodule in the lateral left lung base. The cardiac  silhouette is within normal limits for size.       New small infiltrate and/or pleural effusion at the right lung base.   MACRO: None.   Signed by: Italia Ferreira 8/22/2024 11:23 AM Dictation workstation:   [id number]    Transthoracic Echo (TTE) Complete  Result Date: 8/19/2024  PHYSICIAN INTERPRETATION: Left Ventricle: Left ventricular ejection fraction is normal, by visual estimate at 60%. There are no regional wall motion abnormalities. The left ventricular cavity size is normal. There is mild left ventricular hypertrophy involving the basal wall and septal wall. Spectral Doppler shows an impaired relaxation pattern of left ventricular diastolic filling. Left Atrium: The left atrium is mildly dilated. Right Ventricle: The right ventricle is upper limits of normal in size. There is normal right ventricular global systolic function. Right Atrium: The right atrium is normal in size. Aortic Valve: The aortic valve is trileaflet. There is mild aortic valve cusp calcification. There is mild aortic valve thickening. There is evidence of mildly elevated transaortic gradients consistent with sclerosis of the aortic valve. The aortic valve dimensionless index is 0.76. There is trace aortic valve regurgitation. The peak instantaneous gradient of the aortic valve is 9.3 mmHg. The mean gradient of the aortic valve is 5.3 mmHg. Mitral Valve: The mitral valve is mildly thickened. There is mild mitral valve regurgitation. Tricuspid Valve: The tricuspid valve is structurally normal. There is trace tricuspid regurgitation. Pulmonic Valve: The pulmonic valve is structurally normal. There is no indication of pulmonic valve regurgitation. Pericardium: There is no pericardial effusion noted. Aorta: The aortic root was not assessed.  CONCLUSIONS:  1. Left ventricular ejection fraction is normal, by visual estimate at 60%.  2. Spectral Doppler shows an impaired relaxation pattern of left ventricular diastolic filling.  3. There is  normal right ventricular global systolic function.  4. Aortic valve sclerosis.     Review of Systems   Constitutional:  Positive for activity change and appetite change. Negative for chills and fever.   HENT: Negative.     Respiratory:  Negative for shortness of breath and wheezing.    Cardiovascular:  Negative for chest pain and leg swelling.   Gastrointestinal:  Positive for nausea. Negative for abdominal pain, constipation, diarrhea and vomiting.   Genitourinary:  Positive for difficulty urinating.   Musculoskeletal:  Positive for back pain.   Neurological:  Negative for dizziness and headaches.   Psychiatric/Behavioral:  Negative for agitation, confusion and sleep disturbance.        Functional Status  Activities of Daily Living:  Basic ADLs: (I= independent, A= assistance, D= dependent)  Bathing: A, Dressing: A, Toileting: A, Transferring: A, Continence: A, Feeding: I    Manning Index:    Instrumental ADLs: (I= independent, A= assistance, D= dependent)  Ability to use phone: I, Shopping: D, Cooking: D, Housekeeping: D, Laundry: D, Transportation: D, Medications: I, Handle Finances: D   [place] Scale:       Serious Illness Conversation  What is your understanding now of where you are with your illness:  Good  How much information about what is likely to be ahead with your illness  would you like from me: all  What are your most important goals if your health situation worsens:  comfort  What are your biggest fears and worries about the future with your health:  pain  What gives you strength as you think about the future with your illness:  kids  What abilities are so critical to your life that you can’t imagine living without them:  being with kids  If you become sicker, how much are you willing to go through for the possibility of gaining more time:  not much at all  How much does your family know about your priorities and wishes:  fully aware     Physical Exam  Vitals and nursing note reviewed.   Constitutional:   "     General: She is in acute distress.      Appearance: She is not toxic-appearing.   HENT:      Mouth/Throat:      Mouth: Mucous membranes are dry.      Pharynx: Oropharynx is clear.   Eyes:      General: No scleral icterus.     Extraocular Movements: Extraocular movements intact.   Neck:      Comments: Cervical rotation is decreased  Cardiovascular:      Rate and Rhythm: Normal rate and regular rhythm.      Pulses: Normal pulses.      Heart sounds: Normal heart sounds.   Pulmonary:      Effort: Pulmonary effort is normal. No respiratory distress.      Breath sounds: Normal breath sounds. No wheezing.   Abdominal:      General: Abdomen is flat. There is no distension.      Palpations: Abdomen is soft.      Tenderness: There is no abdominal tenderness. There is no guarding.   Musculoskeletal:      Right lower leg: No edema.      Left lower leg: No edema.   Skin:     Coloration: Skin is pale.      Findings: Bruising present. No rash.   Neurological:      Mental Status: She is alert and oriented to person, place, and time.      Cranial Nerves: No cranial nerve deficit.   Psychiatric:         Mood and Affect: Mood normal.         Behavior: Behavior normal.         Thought Content: Thought content normal.         Judgment: Judgment normal.         Last Recorded Vitals  Blood pressure 129/50, pulse 73, temperature 36.5 °C (97.7 °F), temperature source Oral, resp. rate 18, height 1.575 m (5' 2\"), weight 68 kg (150 lb), SpO2 100%.      PALLIATIVE MEDICINE PALLIATIVE PERFORMANCE SCALE     Palliative Performance Scale % (PPS) 20-30%   Oral Intake Severely reduced (< or = mouthfuls) (2.5)   Edema Absent (0)   Dyspnea at Rest Absent (0)   Delirium Absent (0)   Palliative Prognostic Index (PPI) Total Score 2.5-4   Note: The scores from each prognostic domain are added.  A score of 0 to 2.0 was associated with a median survival of 90 days; score of 2.1 to 4.0 is 61 days, and score of >4.0 is 12 days.        Assessment/Plan "   IMP:    JET on CKD - pt has no interest in dialysis  Electrolyte derangements - 2/2 above  Pneumonia of RLL- denies difficulty with breathing  Back pain - Norco not helping, morphine IVP not helping - see below  Urine retention - now has lundberg    Palliative Care Encounter  Code status updated in EPIC to COMFORT MEASURES ONLY - see below  The patient is capable  SonChristopher is HPOA, daughter Taina is first alternate. LW and HPOA documents are in chart and I have reviewed them both.  The patient has no interest in dialysis or other aggressive treatment measures.   Stated to me in presence of her daughter, that she does not want anything that is going to prolong her life anymore as she feels these repeated hospitalizations and ED visits are resulting in poor QOL. She was very open to hospice discussion. We spoke in the room with Taina present and son Christopher on speakerphone. Reviewed hospice philosophy, shift in goc to comfort only providing treatments/medications to alleviate comfort. Pt does not want to go back to rehab at [place]. She is familiar [facility] hospice model as are her children as there was another daughter about 3 years ago who passed at [facility]. They were agreeable to me placing a referral to [facility]. Will update once a time is confirmed to meet tomorrow.  Pain recommendations - dc morphine and change to 0.4 mg dilaudid IVP q3 hours as needed. Scheduled tylenol 1000 mg ATC. Added lidocaine patches, 2 patches to back daily. Added ativan 0.25 mg IVP q4 prn for nausea/anxiety. Norco dc'd.   Code status:     Patient/proxy preference for information  Prefers full information    Provider estimate of survival: days to weeks  Patient Prognostic Awareness: Yes - congruent with provider estimation    Is the patient hospice-eligible?   Yes  Was a discussion held re hospice services?   yes  Was a decision made re hospice services?  Yes    Goals of Care  Comfort  Symptom Management  Pain: back, morphine not effective, Norco  not effective  Medications recommended for pain?  Yes  Tiredness: no  Nausea: yes  Depression: no  Anxiety: yes  Drowsiness: no  Appetite: poor  Wellbeing: poor  Dyspnea: no  Intervention recommended for dyspnea?  no  Other:   Intervention recommended for constipation?  No    Thank you for this consultation. We will follow.    I spent 120 minutes in the professional and overall care of this patient including 30 min ACP.    Updated Boni Stevens CNP and Dr[facility]  D/w bedside RN     Bibi Todd, APRN-CNP

## 2024-08-24 VITALS
RESPIRATION RATE: 18 BRPM | TEMPERATURE: 97.2 F | SYSTOLIC BLOOD PRESSURE: 123 MMHG | DIASTOLIC BLOOD PRESSURE: 51 MMHG | WEIGHT: 150 LBS | HEIGHT: 62 IN | OXYGEN SATURATION: 100 % | BODY MASS INDEX: 27.6 KG/M2 | HEART RATE: 73 BPM

## 2024-08-24 LAB
ANION GAP SERPL CALC-SCNC: 14 MMOL/L
BUN SERPL-MCNC: 57 MG/DL (ref 8–25)
CALCIUM SERPL-MCNC: 5.8 MG/DL (ref 8.5–10.4)
CHLORIDE SERPL-SCNC: 86 MMOL/L (ref 97–107)
CO2 SERPL-SCNC: 18 MMOL/L (ref 24–31)
CREAT SERPL-MCNC: 3.1 MG/DL (ref 0.4–1.6)
EGFRCR SERPLBLD CKD-EPI 2021: 13 ML/MIN/1.73M*2
ERYTHROCYTE [DISTWIDTH] IN BLOOD BY AUTOMATED COUNT: 12.2 % (ref 11.5–14.5)
GLUCOSE SERPL-MCNC: 67 MG/DL (ref 65–99)
HCT VFR BLD AUTO: 23.8 % (ref 36–46)
HGB BLD-MCNC: 8.1 G/DL (ref 12–16)
LEGIONELLA AG UR QL: NEGATIVE
MCH RBC QN AUTO: 30.3 PG (ref 26–34)
MCHC RBC AUTO-ENTMCNC: 34 G/DL (ref 32–36)
MCV RBC AUTO: 89 FL (ref 80–100)
NRBC BLD-RTO: 0 /100 WBCS (ref 0–0)
PLATELET # BLD AUTO: 282 X10*3/UL (ref 150–450)
POTASSIUM SERPL-SCNC: 4.1 MMOL/L (ref 3.4–5.1)
RBC # BLD AUTO: 2.67 X10*6/UL (ref 4–5.2)
S PNEUM AG UR QL: NEGATIVE
SODIUM SERPL-SCNC: 118 MMOL/L (ref 133–145)
WBC # BLD AUTO: 11.2 X10*3/UL (ref 4.4–11.3)

## 2024-08-24 PROCEDURE — 2500000001 HC RX 250 WO HCPCS SELF ADMINISTERED DRUGS (ALT 637 FOR MEDICARE OP): Performed by: NURSE PRACTITIONER

## 2024-08-24 PROCEDURE — 2500000004 HC RX 250 GENERAL PHARMACY W/ HCPCS (ALT 636 FOR OP/ED): Performed by: NURSE PRACTITIONER

## 2024-08-24 PROCEDURE — 80048 BASIC METABOLIC PNL TOTAL CA: CPT | Performed by: NURSE PRACTITIONER

## 2024-08-24 PROCEDURE — 36415 COLL VENOUS BLD VENIPUNCTURE: CPT | Performed by: NURSE PRACTITIONER

## 2024-08-24 PROCEDURE — 85027 COMPLETE CBC AUTOMATED: CPT | Performed by: NURSE PRACTITIONER

## 2024-08-24 RX ORDER — BISACODYL 5 MG
10 TABLET, DELAYED RELEASE (ENTERIC COATED) ORAL DAILY PRN
Start: 2024-08-24

## 2024-08-24 RX ORDER — ALUMINUM HYDROXIDE, MAGNESIUM HYDROXIDE, AND SIMETHICONE 1200; 120; 1200 MG/30ML; MG/30ML; MG/30ML
30 SUSPENSION ORAL 4 TIMES DAILY PRN
Start: 2024-08-24

## 2024-08-24 RX ORDER — ACETAMINOPHEN 500 MG
1000 TABLET ORAL EVERY 8 HOURS
Start: 2024-08-25

## 2024-08-24 RX ORDER — CYCLOSPORINE 0.5 MG/ML
1 EMULSION OPHTHALMIC DAILY
Start: 2024-08-25

## 2024-08-24 RX ORDER — ALBUTEROL SULFATE 0.83 MG/ML
2.5 SOLUTION RESPIRATORY (INHALATION) EVERY 6 HOURS PRN
Qty: 75 ML | Refills: 11 | Status: SHIPPED | OUTPATIENT
Start: 2024-08-24

## 2024-08-24 RX ORDER — POLYVINYL ALCOHOL 14 MG/ML
1 SOLUTION/ DROPS OPHTHALMIC AS NEEDED
Start: 2024-08-24

## 2024-08-24 ASSESSMENT — COGNITIVE AND FUNCTIONAL STATUS - GENERAL
WALKING IN HOSPITAL ROOM: TOTAL
TURNING FROM BACK TO SIDE WHILE IN FLAT BAD: A LITTLE
DRESSING REGULAR LOWER BODY CLOTHING: A LOT
CLIMB 3 TO 5 STEPS WITH RAILING: TOTAL
MOVING TO AND FROM BED TO CHAIR: A LOT
EATING MEALS: A LITTLE
MOBILITY SCORE: 12
PERSONAL GROOMING: A LOT
DAILY ACTIVITIY SCORE: 13
STANDING UP FROM CHAIR USING ARMS: A LOT
DRESSING REGULAR UPPER BODY CLOTHING: A LOT
MOVING FROM LYING ON BACK TO SITTING ON SIDE OF FLAT BED WITH BEDRAILS: A LITTLE
HELP NEEDED FOR BATHING: A LOT
TOILETING: A LOT

## 2024-08-24 ASSESSMENT — PAIN DESCRIPTION - DESCRIPTORS: DESCRIPTORS: ACHING

## 2024-08-24 ASSESSMENT — PAIN - FUNCTIONAL ASSESSMENT: PAIN_FUNCTIONAL_ASSESSMENT: 0-10

## 2024-08-24 ASSESSMENT — PAIN DESCRIPTION - LOCATION: LOCATION: GENERALIZED

## 2024-08-24 ASSESSMENT — PAIN SCALES - GENERAL
PAINLEVEL_OUTOF10: 10 - WORST POSSIBLE PAIN
PAINLEVEL_OUTOF10: 0 - NO PAIN

## 2024-08-24 NOTE — ASSESSMENT & PLAN NOTE
Patient currently on sodium bicarb oral daily  Sodium 118 today-management per nephrology  Consult nephrology

## 2024-08-24 NOTE — DISCHARGE SUMMARY
Discharge Diagnosis  Hyponatremia      Discharge Meds     Your medication list        ASK your doctor about these medications        Instructions Last Dose Given Next Dose Due   amLODIPine 10 mg tablet  Commonly known as: Norvasc           atorvastatin 10 mg tablet  Commonly known as: Lipitor      TAKE 1 TABLET BY MOUTH EVERY DAY       calcitriol 0.25 mcg capsule  Commonly known as: Rocaltrol           cefuroxime 500 mg tablet  Commonly known as: Ceftin      Take 1 tablet (500 mg) by mouth 2 times a day for 4 days.       FeroSuL tablet  Generic drug: ferrous sulfate (325 mg ferrous sulfate)      Take 1 tablet by mouth once daily with breakfast.       sevelamer carbonate 800 mg tablet  Commonly known as: Renvela           sodium bicarbonate 650 mg tablet                    Test Results Pending At Discharge  Pending Labs       Order Current Status    Blood Culture Preliminary result    Blood Culture Preliminary result            Hospital Course     Annetta Miguel is a 89 y.o. female presenting with generalized weakness.  Patient was recently admitted for UTI and generalized weakness along with hyponatremia.  She has a history of stage IV chronic kidney disease and adamantly refuses hemodialysis.  She sees Dr. Littlejohn.  She was very weak at home today could not get out of bed.  She states that she has had a poor appetite and has not been eating right over the past couple of days.  She states that she aches all over and was given morphine in the emergency room.  She denies chest pain or shortness of breath.  X-ray showed right lower lobe infiltrate possibly pneumonia and she was started on Rocephin and azithromycin in the emergency room.  He will need reevaluated by physical therapy for SNF placement.  She currently resides at [facility].     Patient's electrolytes and condition was declining over the course of her stay and palliative care was consulted.  It was decided that she would be  discharged to hospice and CODE STATUS changed to DNR comfort care.    Pertinent Physical Exam At Time of Discharge  Physical Exam  Constitutional: No acute distress, calm, cooperative  HEENT: PERRL, normocephalic, atraumatic, mucous membranes moist  Cardiovascular: Regular rhythm and rate,   Respiratory: Lungs clear to auscultation,   Gastrointestinal: Bowel sounds positive x 4, soft, nontender  Neurologic: Alert and oriented x 3 equal strength bilaterally  Musculoskeletal: Able to move all extremities, no edema  Skin: Warm, dry and intact  Outpatient Follow-Up  Future Appointments   Date Time Provider Department Center   8/27/2024  3:00 PM MD [id number]   10/28/2024  1:30 PM MD [id number]         HAI Guerrero-CNP   no

## 2024-08-24 NOTE — NURSING NOTE
Pt discharged to [facility]  in stable condition. Hospice RN called report over to Hospice house. IV and lundberg catheter to go with patient per orders from hospice. All belongings sent with patient. Family aware of transfer.

## 2024-08-24 NOTE — CARE PLAN
Family now wants to know if [facility] has LTC beds. Referral placed for Intermediate Care.  16:18 pt is now going to Hospice house today    DISCHARGE PLAN: TBD---DO NOT DISCHARGE PATIENT BEFORE SPEAKING WITH CARE COORDINATION. NEED DEFINITE DISCHARGE PLAN FROM FAMILY

## 2024-08-24 NOTE — ASSESSMENT & PLAN NOTE
Chest x-ray showed infiltrate versus pleural effusion on the right base  Antibiotics discontinued by palliative  Blood cultures and sputum cultures pending  Oxygen as needed for saturation greater 92%  Albuterol as needed for wheezing/shortness of breath

## 2024-08-24 NOTE — CARE PLAN
The patient's goals for the shift include improve mobility.    The clinical goals for the shift include Determine goals of care

## 2024-08-24 NOTE — ASSESSMENT & PLAN NOTE
Improved today, bowel sounds normal    Plan/disposition  Hospice has been consulted  DNR CC  Most likely discharge to hospice care

## 2024-08-24 NOTE — ASSESSMENT & PLAN NOTE
Recently discharged back to assisted living at [facility]  Was unable to ambulate today  PT/OT to evaluate and treat  Most likely will need SNF for rehab  Palliative consult for goals of care-hospice referral placed

## 2024-08-24 NOTE — ASSESSMENT & PLAN NOTE
Patient follows with Dr. Antione Littlejohn  Patient adamantly refuses hemodialysis  Creatinine today is 3.1  Nephrology consulted

## 2024-08-24 NOTE — PROGRESS NOTES
"Annetta Miguel is a 89 y.o. female on day 2 of admission presenting with Hyponatremia.      Subjective   Patient examined sitting up in bed with family at bedside.  No complaints of pain at the present time patient states \"I am hungry\".       Objective     Last Recorded Vitals  BP (!) 129/48 (BP Location: Right arm, Patient Position: Lying)   Pulse 70   Temp 36.4 °C (97.5 °F) (Oral)   Resp 17   Wt 68 kg (150 lb)   SpO2 100%   Intake/Output last 3 Shifts:    Intake/Output Summary (Last 24 hours) at 8/24/2024 1025  Last data filed at 8/24/2024 0546  Gross per 24 hour   Intake --   Output 400 ml   Net -400 ml       Admission Weight  Weight: 68 kg (150 lb) (08/22/24 0941)    Daily Weight  08/22/24 : 68 kg (150 lb)    Image Results      Physical Exam    Constitutional: No acute distress, calm, cooperative  HEENT: PERRL, normocephalic, atraumatic, mucous membranes moist  Cardiovascular: Regular rhythm and rate,   Respiratory: Lungs clear to auscultation,   Gastrointestinal: Bowel sounds positive x 4, soft, nontender  Neurologic: Alert and oriented x 3 equal strength bilaterally  Musculoskeletal: Able to move all extremities, no edema  Skin: Warm, dry and intact             Assessment/Plan                  Assessment & Plan  Hyponatremia  Patient currently on sodium bicarb oral daily  Sodium 118 today-management per nephrology  Consult nephrology  Right lower lobe pneumonia  Chest x-ray showed infiltrate versus pleural effusion on the right base  Antibiotics discontinued by palliative  Blood cultures and sputum cultures pending  Oxygen as needed for saturation greater 92%  Albuterol as needed for wheezing/shortness of breath  Generalized weakness  Recently discharged back to assisted living at [place]  Was unable to ambulate today  PT/OT to evaluate and treat  Most likely will need SNF for rehab  Palliative consult for goals of care-hospice referral placed  Chronic kidney disease, stage V " (Multi)  Patient follows with Dr. Antione Littlejohn  Patient adamantly refuses hemodialysis  Creatinine today is 3.1  Nephrology consulted  Mixed hyperlipidemia  Continue home atorvastatin  Primary hypertension  Continue home amlodipine  Iron deficiency anemia due to chronic blood loss  Continue home ferrous sulfate  Anxiety  Hydroxyzine/Ativan as needed  On deep vein thrombosis (DVT) prophylaxis  Subcutaneous heparin  Abdominal distention  Improved today, bowel sounds normal    Plan/disposition  Hospice has been consulted  DNR CC  Most likely discharge to hospice care                Boni Stevens, APRN-CNP

## 2024-08-25 LAB
BACTERIA BLD CULT: NORMAL
BACTERIA BLD CULT: NORMAL

## 2024-08-26 LAB
BACTERIA BLD CULT: NORMAL
BACTERIA BLD CULT: NORMAL

## 2024-08-27 ENCOUNTER — APPOINTMENT (OUTPATIENT)
Dept: PRIMARY CARE | Facility: CLINIC | Age: 89
End: 2024-08-27
Payer: MEDICARE

## 2024-08-27 PROBLEM — M54.2 NECK PAIN: Status: ACTIVE | Noted: 2024-08-03

## 2024-08-27 RX ORDER — LOSARTAN POTASSIUM 50 MG/1
50 TABLET ORAL DAILY
COMMUNITY
Start: 2024-08-23

## 2024-08-27 RX ORDER — SEVELAMER HYDROCHLORIDE 800 MG/1
800 TABLET, FILM COATED ORAL
COMMUNITY

## 2024-08-27 RX ORDER — OMEGA-3 FATTY ACIDS/FISH OIL 340-1000MG
CAPSULE ORAL EVERY 24 HOURS
COMMUNITY

## 2024-10-28 ENCOUNTER — APPOINTMENT (OUTPATIENT)
Dept: PRIMARY CARE | Facility: CLINIC | Age: 89
End: 2024-10-28
Payer: MEDICARE